# Patient Record
Sex: FEMALE | Race: WHITE | NOT HISPANIC OR LATINO | Employment: OTHER | URBAN - METROPOLITAN AREA
[De-identification: names, ages, dates, MRNs, and addresses within clinical notes are randomized per-mention and may not be internally consistent; named-entity substitution may affect disease eponyms.]

---

## 2012-12-07 LAB — HCV AB SER-ACNC: NEGATIVE

## 2017-02-13 ENCOUNTER — HOSPITAL ENCOUNTER (OUTPATIENT)
Dept: RADIOLOGY | Age: 67
Discharge: HOME | End: 2017-02-13
Payer: MEDICARE

## 2017-02-13 DIAGNOSIS — M85.80 OTHER SPECIFIED DISORDERS OF BONE DENSITY AND STRUCTURE, UNSPECIFIED SITE: ICD-10-CM

## 2017-02-13 DIAGNOSIS — M89.9 DISORDER OF BONE: ICD-10-CM

## 2017-02-13 PROCEDURE — 77080 DXA BONE DENSITY AXIAL: CPT

## 2017-05-22 ENCOUNTER — GENERIC CONVERSION - ENCOUNTER (OUTPATIENT)
Dept: OTHER | Facility: OTHER | Age: 67
End: 2017-05-22

## 2017-05-31 ENCOUNTER — GENERIC CONVERSION - ENCOUNTER (OUTPATIENT)
Dept: OTHER | Facility: OTHER | Age: 67
End: 2017-05-31

## 2017-07-06 ENCOUNTER — GENERIC CONVERSION - ENCOUNTER (OUTPATIENT)
Dept: OTHER | Facility: OTHER | Age: 67
End: 2017-07-06

## 2017-10-24 ENCOUNTER — GENERIC CONVERSION - ENCOUNTER (OUTPATIENT)
Dept: OTHER | Facility: OTHER | Age: 67
End: 2017-10-24

## 2017-11-27 ENCOUNTER — ALLSCRIPTS OFFICE VISIT (OUTPATIENT)
Dept: OTHER | Facility: OTHER | Age: 67
End: 2017-11-27

## 2017-11-27 LAB — HBA1C MFR BLD HPLC: 5.7 %

## 2017-11-28 NOTE — PROGRESS NOTES
Assessment    1  Esophageal reflux (530 81) (K21 9)   2  History of dysphagia (V12 79) (Z87 19)   3  Dysphagia (787 20) (R13 10)   4  Difficulty hearing (389 9) (H91 90)    1  Health maintenance-given influenza vaccine today 2  Health maintenance-has a prescription for herpes zoster and Adacel vaccine 3  Difficulty hearing with impacted cerumen-unable to flush today  Patient will use Debrox ear drops return for repeat treatment in 2 weeks 4  Hyperlipidemia with an LDL of 161  Father had stroke in his 46s  Told her she needs to be more aggressive with lower number-she wants to try conservative therapy and recheck in several months 5  Pre diabetes-hemoglobin A1c 5 7 brother had diabetes but he was an alcoholic  Counseled on appropriate diet  For recheck in the future 6  Intermittent reflux with occasional solid food dysphagia-we talked in the past about potential Schatzki's ring and she was referred for an endoscopy but never went  I again recommended an endoscopy but she deferred realizing the risks she is taking  She states if unimproved in a few months she will then consider proceeding 7-Previous admission to the hospital for GI bleed probably diverticular in origin  Has significant diverticular disease and colonoscopy #8  polyps of the mid transverse colon that followup colonoscopy recently done in July 2014 with repeat recommended in 5 years  This was DEXA scan done in February 2017 shows L-spine -1 2 and hip of-1 9-repeat will be needed in 2-3 years which would be March of 2020 19 March 2020 done by Dr Walls Cea  #9 osteopenia-bone density done previously showed left hip of -1 4  Syble Cruz At one point she was on oral bisphosphonate for one year via the GYN  #10 history of solid food dysphasia-one point was referred for an EGD to rule out Schatzki's ring but she never went #11 family history breast carcinoma-mother and paternal aunt  Benign exam today   Mammogram done October 2015 benign #12 hyperlipidemia-continue conservative therapy 13 family history diabetes he says that the above are normal #14 atypical chest pain-asymptomatic-monitor  Returning for flushing of ears in 2 weeks  Will then need follow-up appointment in 6 months with prior lipids A1c, chemistry profile and CBC   Plan  Hyperlipidemia    · Hemoglobin A1c- POC; Status:Complete;   Done: [id number] 08:51AM  Hyperlipidemia, Prediabetes    · (1) CHOLESTEROL, TOTAL; Status:Active; Requested for:01May2018;    · (1) COMPREHENSIVE METABOLIC PANEL; Status:Active; Requested for:01May2018;    · (1) HDL,DIRECT; Status:Active; Requested for:01May2018;    · (1) HEMOGLOBIN A1C; Status:Active; Requested for:01May2018;    · (1) LDL,DIRECT; Status:Active; Requested for:01May2018;    · (1) TRIGLYCERIDE; Status:Active; Requested for:01May2018;   Need for influenza vaccination    · Fluzone High-Dose 0 5 ML Intramuscular Suspension Prefilled Syringe  Wax in ear    · Removal Impacted Cerumen Using Irrigation / Lavage one or both ears - POC; Status:Complete;  Done: [id number]  Patient to use Debrox and return in 2 weeks for repeat evaluation  Endoscopy recommended but patient defers  Influenza vaccine administered  May need to consider therapy with a statin   History of Present Illness  HPI: She was here for her Medicare well visit with several issues noted  She notes decreased hearing was found to have bilateral impacted cerumen  Attempt was made to flush but there was great difficulty  She will be returning in 2 weeks for repeat treatment after use with Debrox  laboratory testing shows a sugar 117  Hemoglobin A1c done in the office today 5 7  Cholesterol 254 with an HDL of 69  vitamin-D level of 40  Hemoglobin borderline at 50 0 8 with normal platelet count and white count  has hyperlipidemia  She is not a smoker and does not have hypertension or diabetes  Father had a stroke in his 46s  I explained to the patient we want to treat this more aggressively   She wants to follow diet see how she does  She may be candidate for statin is aware this  about esophageal reflux  She has dyspepsia several times per week  He has occasional solid food dysphagia  She does not have significant caffeine or alcohol intake  Because of duration of symptoms I recommended endoscopy but she deferred  She wants to follow conservative measures more aggressively  She realizes the risks she is taking      Review of Systems  Complete-Female:  Constitutional: No fever, no chills, feels well, no tiredness, no recent weight gain or weight loss  Eyes: No complaints of eye pain, no red eyes, no eyesight problems, no discharge, no dry eyes, no itching of eyes  ENT: hearing loss, but-- no earache,-- no nosebleeds,-- no sore throat-- and-- no nasal discharge  Cardiovascular: No complaints of slow heart rate, no fast heart rate, no chest pain, no palpitations, no leg claudication, no lower extremity edema  Respiratory: No complaints of shortness of breath, no wheezing, no cough, no SOB on exertion, no orthopnea, no PND  Gastrointestinal: Solid food dysphagia and pyrosis, but-- no nausea,-- no vomiting,-- no diarrhea-- and-- no blood in stools  Genitourinary: No complaints of dysuria, no incontinence, no pelvic pain, no dysmenorrhea, no vaginal discharge or bleeding  Musculoskeletal: No complaints of arthralgias, no myalgias, no joint swelling or stiffness, no limb pain or swelling  Integumentary: No complaints of skin rash or lesions, no itching, no skin wounds, no breast pain or lump  Neurological: No complaints of headache, no confusion, no convulsions, no numbness, no dizziness or fainting, no tingling, no limb weakness, no difficulty walking  Psychiatric: Not suicidal, no sleep disturbance, no anxiety or depression, no change in personality, no emotional problems  Endocrine: No complaints of proptosis, no hot flashes, no muscle weakness, no deepening of the voice, no feelings of weakness  Hematologic/Lymphatic: No complaints of swollen glands, no swollen glands in the neck, does not bleed easily, does not bruise easily  Active Problems  1  Abnormal movement in bone (733 90) (M89 9)   2  Atypical chest pain (786 59) (R07 89)   3  Diverticulosis (562 10) (K57 90)   4  Esophageal reflux (530 81) (K21 9)   5  Gastrointestinal bleeding (578 9) (K92 2)   6  Hyperlipidemia (272 4) (E78 5)   7  Need for influenza vaccination (V04 81) (Z23)   8  Osteopenia (733 90) (M85 80)   9  Postmenopausal disorder (627 9) (N95 9)   10  Prediabetes (790 29) (R73 03)   11  Vitamin D deficiency (268 9) (E55 9)   12  Wax in ear (380 4) (H61 20)    Past Medical History  1  History of Cervical Cancer (V10 41)   2  History of dysphagia (V12 79) (Z87 19)   3  History of hyperlipidemia (V12 29) (Z86 39)   4  History of Previous Pregnancies Resulted In 2  Living Children   5  History of Previously Pregnant With 2  Normal Vaginal Deliveries   6  History of Summary Of Previous Pregnancies  2  (Total No )  Active Problems And Past Medical History Reviewed: The active problems and past medical history were reviewed and updated today  Surgical History  1  History of Biopsy Breast Open   2  History of Colonoscopy (Fiberoptic)   3  History of Hysterectomy  Surgical History Reviewed: The surgical history was reviewed and updated today  Family History  Mother    1  Family history of Breast Cancer (V16 3)  Maternal Grandmother    2  Family history of Ovarian Cancer (V16 41)  Family History    3  Family history of Breast Cancer (V16 3)   4  Family history of Thyroid Disorder (V18 19)    Social History     · Being A Social Drinker   · Denied: History of Caffeine Use   · Currently sexually active   · Denied: History of Drug Use   · Denied: History of Exercising Regularly   · Never A Smoker  Social History Reviewed: The social history was reviewed and updated today  The social history was reviewed and is unchanged  Current Meds   1  Calcium TABS; Therapy: (Recorded:09Apr2014) to Recorded   2  Probiotic CAPS; Therapy: (Recorded:09Apr2014) to Recorded   3  Vitamin D 1000 UNIT Oral Tablet; TAKE 3 TABLETS DAILY; Therapy: [id number] to Recorded  Medication List Reviewed: The medication list was reviewed and updated today  Allergies  1  Codeine Sulfate TABS    Vitals  Vital Signs    Recorded: [id number] 07:43PM Recorded: [id number] 08:31AM   Heart Rate 68    Respiration 14    Systolic 752, RLE, Sitting    Diastolic 76, RLE, Sitting    Height  5 ft 5 in   Weight  163 lb 8 oz   BMI Calculated  27 21   BSA Calculated  1 82       Physical Exam   Constitutional  General appearance: No acute distress, well appearing and well nourished  Head and Face  Head and face: Normal    Palpation of the face and sinuses: No sinus tenderness  Eyes  Conjunctiva and lids: No swelling, erythema or discharge  Pupils and irises: Equal, round, reactive to light  Ophthalmoscopic examination: Normal fundi and optic discs  Ears, Nose, Mouth, and Throat  External inspection of ears and nose: Normal    Otoscopic examination: Abnormal  -- Bilateral impacted cerumen  Hearing: Normal    Nasal mucosa, septum, and turbinates: Normal without edema or erythema  Lips, teeth, and gums: Normal, good dentition  Oropharynx: Normal with no erythema, edema, exudate or lesions  Neck  Neck: Supple, symmetric, trachea midline, no masses  Thyroid: Normal, no thyromegaly  Pulmonary  Respiratory effort: No increased work of breathing or signs of respiratory distress  Percussion of chest: Normal    Palpation of chest: Normal    Auscultation of lungs: Clear to auscultation  Cardiovascular  Palpation of heart: Normal PMI, no thrills  Auscultation of heart: Normal rate and rhythm, normal S1 and S2, no murmurs  Carotid pulses: 2+ bilaterally  Abdominal aorta: Normal    Femoral pulses: 2+ bilaterally  Pedal pulses: 2+ bilaterally     Examination of extremities for edema and/or varicosities: Normal    Chest  Breasts: Normal, no dimpling or skin changes appreciated  Palpation of breasts and axillae: Normal, no masses palpated  Abdomen  Abdomen: Non-tender, no masses  Liver and spleen: No hepatomegaly or splenomegaly  Examination for hernias: No hernia appreciated  Anus, perineum, and rectum: Normal sphincter tone, no masses, no prolapse  Lymphatic  Palpation of lymph nodes in neck: No lymphadenopathy  Palpation of lymph nodes in axillae: No lymphadenopathy  Palpation of lymph nodes in groin: No lymphadenopathy  Palpation of lymph nodes in other areas: No lymphadenopathy  Musculoskeletal  Gait and station: Normal    Digits and nails: Normal without clubbing or cyanosis  Joints, bones, and muscles: Abnormal  -- Mild crepitance on range of motion of the knees  Range of motion: Normal    Stability: Normal    Muscle strength/tone: Normal    Skin  Skin and subcutaneous tissue: Normal without rashes or lesions  Examination of the skin for lesions: Abnormal  -- Benign nevi  Palpation of skin and subcutaneous tissue: Normal turgor  Neurologic  Cranial nerves: Cranial nerves II-XII intact  Reflexes: 2+ and symmetric  Sensation: No sensory loss  Psychiatric  Judgment and insight: Normal    Orientation to person, place, and time: Normal    Recent and remote memory: Intact  Mood and affect: Normal        Results/Data  PHQ-2 Adult Depression Screening 27Nov2017 09:25AM User, s     Test Name Result Flag Reference   PHQ-2 Adult Depression Score 0       Over the last two weeks, how often have you been bothered by any of the following problems?  Little interest or pleasure in doing things: Not at all - 0 Feeling down, depressed, or hopeless: Not at all - 0   PHQ-2 Adult Depression Screening Negative       Hemoglobin A1c- POC [id number] 08:51AM Guillermo Correa     Test Name Result Flag Reference   HEMOGLOBIN A1C 5 7 Procedure           Indication: cerumen impaction in both ears  Procedure Note: The procedure was performed by the Provider  A otoscope was placed in the ear canal(s) to visualize the ear canal debris  The ear was cleaned by using warm water irrigation-- and-- a curette  Post-Procedure:  Patient Status: the patient tolerated the procedure well  Complications: there were no complications  Follow-up in 2 weeks for additional treatment week(s)  Future Appointments    Date/Time Provider Specialty Site   05/29/2018 08:30 AM RUSS Walker   Internal Medicine Mary Anne Cormier MD       Signatures   Electronically signed by : RUSS Ortiz ; Nov 27 2017  8:03PM EST                       (Author)

## 2017-12-15 ENCOUNTER — GENERIC CONVERSION - ENCOUNTER (OUTPATIENT)
Dept: OTHER | Facility: OTHER | Age: 67
End: 2017-12-15

## 2018-01-13 VITALS
HEIGHT: 65 IN | BODY MASS INDEX: 27.24 KG/M2 | SYSTOLIC BLOOD PRESSURE: 128 MMHG | WEIGHT: 163.5 LBS | DIASTOLIC BLOOD PRESSURE: 76 MMHG | HEART RATE: 68 BPM | RESPIRATION RATE: 14 BRPM

## 2018-01-23 NOTE — PROGRESS NOTES
Assessment  1  General care-Medicare well visit completed  2  Health maintenance-given influenza vaccine today  3  Esophageal reflux with solid food dysphagia-see separate dictation of this date     Plan  Hyperlipidemia    · Hemoglobin A1c- POC; Status:Complete;   Done: [id number] 08:51AM  Hyperlipidemia, Prediabetes    · (1) CHOLESTEROL, TOTAL; Status:Active; Requested for:01May2018;    · (1) COMPREHENSIVE METABOLIC PANEL; Status:Active; Requested for:01May2018;    · (1) HDL,DIRECT; Status:Active; Requested for:01May2018;    · (1) HEMOGLOBIN A1C; Status:Active; Requested for:01May2018;    · (1) LDL,DIRECT; Status:Active; Requested for:01May2018;    · (1) TRIGLYCERIDE; Status:Active; Requested for:01May2018;   Need for influenza vaccination    · Fluzone High-Dose 0 5 ML Intramuscular Suspension Prefilled Syringe  Wax in ear    · Removal Impacted Cerumen Using Irrigation / Lavage one or both ears - POC;  Status:Complete;   Done: [id number]    History of Present Illness  Welcome to Medicare and Wellness Visits: The patient is being seen for the initial annual wellness visit  Medicare Screening and Risk Factors   Hospitalizations: no previous hospitalizations and No recent hospitalizations  Once per lifetime medicare screening tests: ECG and Most recent EKG shows sinus rhythm without ischemia  Medicare Screening Tests Risk Questions   Abdominal aortic aneurysm risk assessment: Prior radiographic study negative for AAA  Osteoporosis risk assessment: , female gender, over 48years of age and Has periodic bone  HIV risk assessment: none indicated  Drug and Alcohol Use: The patient has never smoked cigarettes  The patient reports occasional alcohol use and drinking 1 drinks per month  Alcohol concern:   The patient has no concerns about alcohol abuse  She has never used illicit drugs     Diet and Physical Activity: Current diet includes well balanced meals, 1 servings of fruit per day, 1 servings of vegetables per day, 1 servings of meat per day, 1 servings of whole grains per day, 1 servings of dairy products per day, 0 cups of coffee per day, 0 cups of tea per day, 0 cans of regular soda per day, 0 cans of diet soda per day and 4 glasses of water per day  The patient does not exercise  Mood Disorder and Cognitive Impairment Screening: Anxiety screening was done using jean 7  Depression screening was done using who wellbeing index score of 23  She denies feeling down, depressed, or hopeless over the past two weeks  She denies feeling little interest or pleasure in doing things over the past two weeks  Cognitive impairment screening: denies difficulty learning/retaining new information, denies difficulty handling complex tasks, denies difficulty with reasoning, denies difficulty with spatial ability and orientation, denies difficulty with language and denies difficulty with behavior  Functional Ability/Level of Safety: She reports hearing difficulties  She does not use a hearing aid  The patient is currently able to do activities of daily living without limitations, able to do instrumental activities of daily living without limitations, able to participate in social activities without limitations and able to drive without limitations  Activities of daily living details: does not need help using the phone, no transportation help needed, does not need help shopping, no meal preparation help needed, does not need help doing housework, does not need help doing laundry, does not need help managing medications and does not need help managing money  Injury History: no polypharmacy, no alcohol use, no mobility impairment, no antidepressant use, no deconditioning, no postural hypotension, no sedative use, no visual impairment, no urinary incontinence, no antihypertensive use, no cognitive impairment, up and go test was normal and previous fall     Home safety risk factors:  no handrails on the stairs, but no unfamiliar surroundings, no loose rugs, no poor household lighting, no uneven floors, no household clutter and grab bars in the bathroom  Advance Directives: Advance directives: no living will, no durable power of  for health care directives and no advance directives  end of life decisions were reviewed with the patient and I agree with the patient's decisions  Co-Managers and Medical Equipment/Suppliers: See Patient Care Team   Additional Information: Given influenza vaccine today  Has had both types of pneumococcal vaccine  Herpes zoster vaccine recommended  Subsequent colonoscopy due in year 2019  Had mammogram in the year 2017  Had bone density study in the year 2017  Review of Systems    Constitutional: negative  Eyes: negative  ENT: negative  Cardiovascular: negative  Respiratory: negative  Gastrointestinal: Intermittent solid food dysphagia as well as pyrosis, but no abdominal pain, no abdominal bloating, no abdominal cramps, no nausea, no vomiting, no diarrhea, able to pass flatus, no constipation, no bright red blood per rectum and no melena  Genitourinary: negative  Musculoskeletal: negative  Integumentary and Breasts: negative  Neurological: negative  Psychiatric: negative  Endocrine: negative  Hematologic and Lymphatic: negative  Active Problems    1  Abnormal movement in bone (733 90) (M89 9)   2  Atypical chest pain (786 59) (R07 89)   3  Diverticulosis (562 10) (K57 90)   4  Dysphagia (787 20) (R13 10)   5  Esophageal reflux (530 81) (K21 9)   6  Gastrointestinal bleeding (578 9) (K92 2)   7  Hyperlipidemia (272 4) (E78 5)   8  Osteopenia (733 90) (M85 80)   9  Postmenopausal disorder (627 9) (N95 9)   10   Vitamin D deficiency (268 9) (E55 9)    Past Medical History    · History of Cervical Cancer (V10 41)   · History of hyperlipidemia (V12 29) (Z86 39)   · History of Previous Pregnancies Resulted In 2  Living Children   · History of Previously Pregnant With 2  Normal Vaginal Deliveries   · History of Summary Of Previous Pregnancies  2  (Total No )    The active problems and past medical history were reviewed and updated today  Surgical History    · History of Biopsy Breast Open   · History of Colonoscopy (Fiberoptic)   · History of Hysterectomy    The surgical history was reviewed and updated today  Family History  Mother    · Family history of Breast Cancer (V16 3)  Maternal Grandmother    · Family history of Ovarian Cancer (V16 41)  Family History    · Family history of Breast Cancer (V16 3)   · Family history of Thyroid Disorder (V18 19)    The family history was reviewed and updated today  Social History    · Being A Social Drinker   · Denied: History of Caffeine Use   · Currently sexually active   · Denied: History of Drug Use   · Denied: History of Exercising Regularly   · Never A Smoker  The social history was reviewed and updated today  The social history was reviewed and is unchanged  Current Meds   1  Calcium TABS; Therapy: (Recorded:2014) to Recorded   2  Probiotic CAPS; Therapy: (Recorded:2014) to Recorded   3  Vitamin D 1000 UNIT Oral Tablet; TAKE 3 TABLETS DAILY; Therapy: [id number] to Recorded    The medication list was reviewed and updated today  Allergies    1  Codeine Sulfate TABS    Immunizations   1 2 3    Influenza  2012    PPSV  2014      Vitals  Signs    Heart Rate: 68  Respiration: 14  Systolic: 878, RLE, Sitting  Diastolic: 76, RLE, Sitting   Height: 5 ft 5 in  Weight: 163 lb 8 oz  BMI Calculated: 27 21  BSA Calculated: 1 82    Physical Exam    Constitutional   General appearance: No acute distress, well appearing and well nourished  Head and Face   Head and face: Normal     Palpation of the face and sinuses: No sinus tenderness  Eyes   Conjunctiva and lids: No swelling, erythema or discharge      Pupils and irises: Equal, round, reactive to light  Ophthalmoscopic examination: Normal fundi and optic discs  Ears, Nose, Mouth, and Throat   External inspection of ears and nose: Normal     Otoscopic examination: Tympanic membranes translucent with normal light reflex  Canals patent without erythema  Hearing: Normal     Nasal mucosa, septum, and turbinates: Normal without edema or erythema  Lips, teeth, and gums: Normal, good dentition  Oropharynx: Normal with no erythema, edema, exudate or lesions  Neck   Neck: Supple, symmetric, trachea midline, no masses  Thyroid: Normal, no thyromegaly  Pulmonary   Respiratory effort: No increased work of breathing or signs of respiratory distress  Percussion of chest: Normal     Palpation of chest: Normal     Auscultation of lungs: Clear to auscultation  Cardiovascular   Palpation of heart: Normal PMI, no thrills  Auscultation of heart: Normal rate and rhythm, normal S1 and S2, no murmurs  Carotid pulses: 2+ bilaterally  Abdominal aorta: Normal     Femoral pulses: 2+ bilaterally  Pedal pulses: 2+ bilaterally  Examination of extremities for edema and/or varicosities: Normal     Chest   Breasts: Normal, no dimpling or skin changes appreciated  Palpation of breasts and axillae: Normal, no masses palpated  Abdomen   Abdomen: Non-tender, no masses  Liver and spleen: No hepatomegaly or splenomegaly  Examination for hernias: No hernia appreciated  Anus, perineum, and rectum: Normal sphincter tone, no masses, no prolapse  Lymphatic   Palpation of lymph nodes in neck: No lymphadenopathy  Palpation of lymph nodes in axillae: No lymphadenopathy  Palpation of lymph nodes in groin: No lymphadenopathy  Palpation of lymph nodes in other areas: No lymphadenopathy  Musculoskeletal   Gait and station: Normal     Digits and nails: Normal without clubbing or cyanosis      Joints, bones, and muscles: Abnormal   Mild crepitance on range of motion of the knees  Range of motion: Normal     Stability: Normal     Muscle strength/tone: Normal     Skin   Skin and subcutaneous tissue: Normal without rashes or lesions  Examination of the skin for lesions: Abnormal   Benign nevi  Palpation of skin and subcutaneous tissue: Normal turgor  Neurologic   Cranial nerves: Cranial nerves II-XII intact  Reflexes: 2+ and symmetric  Sensation: No sensory loss  Psychiatric   Judgment and insight: Normal     Orientation to person, place, and time: Normal     Recent and remote memory: Intact  Mood and affect: Normal        Results/Data  Hemoglobin A1c- POC [id number] 08:51AM Matthew Elias     Test Name Result Flag Reference   HEMOGLOBIN A1C 5 7         Future Appointments    Date/Time Provider Specialty Site   05/29/2018 08:30 AM RUSS Cowan   Internal Medicine Cordelia Victoria MD     Signatures   Electronically signed by : RUSS Spencer ; Nov 27 2017  7:48PM EST                       (Author)

## 2018-01-24 VITALS — HEIGHT: 65 IN | BODY MASS INDEX: 27.39 KG/M2 | WEIGHT: 164.38 LBS

## 2018-05-01 DIAGNOSIS — R73.03 PREDIABETES: ICD-10-CM

## 2018-05-01 DIAGNOSIS — E78.5 HYPERLIPIDEMIA: ICD-10-CM

## 2019-05-21 ENCOUNTER — OFFICE VISIT (OUTPATIENT)
Dept: OBGYN CLINIC | Facility: CLINIC | Age: 69
End: 2019-05-21
Payer: MEDICARE

## 2019-05-21 VITALS
BODY MASS INDEX: 25.71 KG/M2 | DIASTOLIC BLOOD PRESSURE: 72 MMHG | WEIGHT: 160 LBS | HEIGHT: 66 IN | SYSTOLIC BLOOD PRESSURE: 120 MMHG

## 2019-05-21 DIAGNOSIS — Z01.419 ENCOUNTER FOR GYNECOLOGICAL EXAMINATION WITHOUT ABNORMAL FINDING: ICD-10-CM

## 2019-05-21 DIAGNOSIS — Z12.39 SCREENING FOR MALIGNANT NEOPLASM OF BREAST: ICD-10-CM

## 2019-05-21 DIAGNOSIS — C53.9 MALIGNANT NEOPLASM OF CERVIX, UNSPECIFIED SITE (HCC): ICD-10-CM

## 2019-05-21 DIAGNOSIS — N95.1 POSTMENOPAUSAL DISORDER: Primary | ICD-10-CM

## 2019-05-21 PROCEDURE — G0101 CA SCREEN;PELVIC/BREAST EXAM: HCPCS | Performed by: PHYSICIAN ASSISTANT

## 2019-09-10 ENCOUNTER — TELEPHONE (OUTPATIENT)
Dept: INTERNAL MEDICINE CLINIC | Facility: CLINIC | Age: 69
End: 2019-09-10

## 2019-09-10 DIAGNOSIS — E55.9 VITAMIN D DEFICIENCY: ICD-10-CM

## 2019-09-10 DIAGNOSIS — M85.89 OSTEOPENIA OF MULTIPLE SITES: ICD-10-CM

## 2019-09-10 DIAGNOSIS — E78.01 FAMILIAL HYPERCHOLESTEROLEMIA: Primary | ICD-10-CM

## 2019-09-10 DIAGNOSIS — I10 HYPERTENSION, UNSPECIFIED TYPE: ICD-10-CM

## 2019-09-10 NOTE — TELEPHONE ENCOUNTER
She will need CBC with diff, chemistry profile, cholesterol, HDL, LDL, triglycerides, vitamin-D level,   Sed rate, -8 hour fast with diagnosis of hyperlipidemia osteopenia     Also this patient should be seen by dr Lashawn Salgado-  Please explain the office situation to her and switch to Dr Lashawn Salgado  As her primary care physician

## 2019-09-10 NOTE — TELEPHONE ENCOUNTER
Patient called and rescheduled from her may appt , she is coming in October 8th   Please give me labs

## 2019-10-08 ENCOUNTER — OFFICE VISIT (OUTPATIENT)
Dept: INTERNAL MEDICINE CLINIC | Facility: CLINIC | Age: 69
End: 2019-10-08
Payer: MEDICARE

## 2019-10-08 VITALS
DIASTOLIC BLOOD PRESSURE: 82 MMHG | BODY MASS INDEX: 26.23 KG/M2 | RESPIRATION RATE: 15 BRPM | HEART RATE: 90 BPM | SYSTOLIC BLOOD PRESSURE: 126 MMHG | OXYGEN SATURATION: 97 % | HEIGHT: 66 IN | WEIGHT: 163.2 LBS

## 2019-10-08 DIAGNOSIS — Z23 INFLUENZA VACCINE NEEDED: ICD-10-CM

## 2019-10-08 DIAGNOSIS — E78.2 MIXED HYPERLIPIDEMIA: Primary | ICD-10-CM

## 2019-10-08 DIAGNOSIS — Z12.11 SCREENING FOR COLON CANCER: ICD-10-CM

## 2019-10-08 DIAGNOSIS — H61.23 BILATERAL HEARING LOSS DUE TO CERUMEN IMPACTION: ICD-10-CM

## 2019-10-08 DIAGNOSIS — Z13.1 SCREENING FOR DIABETES MELLITUS: ICD-10-CM

## 2019-10-08 DIAGNOSIS — Z13.29 SCREENING FOR THYROID DISORDER: ICD-10-CM

## 2019-10-08 DIAGNOSIS — E55.9 VITAMIN D DEFICIENCY: ICD-10-CM

## 2019-10-08 DIAGNOSIS — R79.9 ABNORMAL FINDING OF BLOOD CHEMISTRY, UNSPECIFIED: ICD-10-CM

## 2019-10-08 PROCEDURE — 90662 IIV NO PRSV INCREASED AG IM: CPT

## 2019-10-08 PROCEDURE — G0439 PPPS, SUBSEQ VISIT: HCPCS | Performed by: INTERNAL MEDICINE

## 2019-10-08 PROCEDURE — G0008 ADMIN INFLUENZA VIRUS VAC: HCPCS

## 2019-10-08 RX ORDER — ATORVASTATIN CALCIUM 10 MG/1
10 TABLET, FILM COATED ORAL DAILY
Qty: 90 TABLET | Refills: 3 | Status: SHIPPED | OUTPATIENT
Start: 2019-10-08 | End: 2020-10-05 | Stop reason: SDUPTHER

## 2019-10-08 NOTE — PROGRESS NOTES
Assessment/Plan:    #GERD  -acid reflux with occasional dysphagia  -was referred for EGD in the past but defers for now  -currently on warm water and baking soda mix that helps  -takes tums intermittently    #HLD  - HDL 60  -start on low dose atorvastatin  -reports she eats healthy and exercises by walking often    #Hearing Loss with Cerumen Impaction  -cerumen impaction b/l and attempted to irrigate at bedside wtihout success  -refer to ENT    #Osteopenia  -DEXA 2017 with hip -1 9  -repeat in March 2020  -at one time had been on bisphosphonate therapy per gyn but stopped after a year    #Prediabetes  -a1c previously 5 7  -will recheck a1c with next set of labs     #Obesity  BMI Counseling: Body mass index is 26 34 kg/m²  Discussed the patient's BMI with her  The BMI is above normal  Nutrition recommendations include decreasing overall calorie intake  Exercise recommendations include vigorous aerobic physical activity for 75 minutes/week  #Health Maintenance  -return to care 6 months with labs  -flu vaccine up to date 2019  -shingles vaccine deferred due to cost  -patient to obtain TDAP from pharmacy  -mammogram due now  -colonoscopy due now  -DEXA 2017 and repeat March 2020    Addendum 06/18/2020 , HDL 61, vitamin-D 54,  TSH 6 76, A1c 5 8, follow-up 06/23/2020  No problem-specific Assessment & Plan notes found for this encounter  Diagnoses and all orders for this visit:    Mixed hyperlipidemia  -     atorvastatin (LIPITOR) 10 mg tablet; Take 1 tablet (10 mg total) by mouth daily  -     CBC and differential; Future  -     Lipid Panel with Direct LDL reflex; Future  -     Hemoglobin A1C; Future  -     TSH, 3rd generation with Free T4 reflex; Future  -     Comprehensive metabolic panel; Future  -     Vitamin D 25 hydroxy; Future    Bilateral hearing loss due to cerumen impaction  -     CBC and differential; Future  -     Lipid Panel with Direct LDL reflex;  Future  -     Hemoglobin A1C; Future  -     TSH, 3rd generation with Free T4 reflex; Future  -     Comprehensive metabolic panel; Future  -     Vitamin D 25 hydroxy; Future  -     Ambulatory Referral to Otolaryngology; Future    Screening for thyroid disorder  -     CBC and differential; Future  -     Lipid Panel with Direct LDL reflex; Future  -     Hemoglobin A1C; Future  -     TSH, 3rd generation with Free T4 reflex; Future  -     Comprehensive metabolic panel; Future  -     Vitamin D 25 hydroxy; Future    Vitamin D deficiency  -     CBC and differential; Future  -     Lipid Panel with Direct LDL reflex; Future  -     Hemoglobin A1C; Future  -     TSH, 3rd generation with Free T4 reflex; Future  -     Comprehensive metabolic panel; Future  -     Vitamin D 25 hydroxy; Future    Screening for colon cancer  -     Ambulatory referral to Colorectal Surgery; Future  -     CBC and differential; Future  -     Lipid Panel with Direct LDL reflex; Future  -     Hemoglobin A1C; Future  -     TSH, 3rd generation with Free T4 reflex; Future  -     Comprehensive metabolic panel; Future  -     Vitamin D 25 hydroxy; Future    Screening for diabetes mellitus  -     CBC and differential; Future  -     Lipid Panel with Direct LDL reflex; Future  -     Hemoglobin A1C; Future  -     TSH, 3rd generation with Free T4 reflex; Future  -     Comprehensive metabolic panel; Future  -     Vitamin D 25 hydroxy; Future    Influenza vaccine needed  -     influenza vaccine, 2025-7811, high-dose, PF 0 5 mL (FLUZONE HIGH-DOSE)  -     CBC and differential; Future  -     Lipid Panel with Direct LDL reflex; Future  -     Hemoglobin A1C; Future  -     TSH, 3rd generation with Free T4 reflex; Future  -     Comprehensive metabolic panel; Future  -     Vitamin D 25 hydroxy;  Future    Abnormal finding of blood chemistry, unspecified   -     Hemoglobin A1C; Future            Current Outpatient Medications:     Calcium Carb-Cholecalciferol (CALCIUM 1000 + D) 1000-800 MG-UNIT TABS, Take by mouth, Disp: , Rfl:     cholecalciferol (VITAMIN D3) 1,000 units tablet, Take 3 tablets by mouth daily, Disp: , Rfl:     Probiotic Product (PROBIOTIC-10) CAPS, Take by mouth, Disp: , Rfl:     atorvastatin (LIPITOR) 10 mg tablet, Take 1 tablet (10 mg total) by mouth daily, Disp: 90 tablet, Rfl: 3    Subjective:      Patient ID: Nelson Najera is a 77 y o  female  HPI     Patient presents for routine visit  Reports that last year she missed her annual exam   She states that she has no issues at this time except for acid reflux for which she takes baking soda and warm water along with Tums  She is reluctant to start any new medications and states that she would like to try natural remedies  She has a history of cerumen impaction and we will disimpact her ears at the bedside today without success  She will need to see ENT for further irrigation  Her LDL was elevated at 161 and she has a family history of hyperlipidemia for which we will need to start her on a statin  We will start her on 10 mg atorvastatin informed her to notify us if she develops any myalgias or weakness in the legs  She is due for repeat labs and we will repeat her labs in 6 months  She has due for repeat colonoscopy in her most recent was 2014  She is also due for mammogram and DEXA scan which she will obtain later this month  She will receive her flu vaccine today  She states that she is deferring on the shingles vaccine due to cost   She will obtain the TDAP from her pharmacy  She    The following portions of the patient's history were reviewed and updated as appropriate: allergies, current medications, past family history, past medical history, past social history, past surgical history and problem list     Review of Systems   Constitutional: Negative for activity change, appetite change, chills, fatigue and fever  HENT: Positive for hearing loss   Negative for congestion, ear pain, facial swelling, sore throat, tinnitus and trouble swallowing  Eyes: Negative for photophobia and visual disturbance  Respiratory: Negative for cough, shortness of breath and wheezing  Cardiovascular: Negative for chest pain and leg swelling  Gastrointestinal: Negative for abdominal distention, abdominal pain, blood in stool, nausea and vomiting  Genitourinary: Negative for difficulty urinating, dysuria and pelvic pain  Musculoskeletal: Negative for arthralgias, back pain, gait problem, joint swelling, myalgias, neck pain and neck stiffness  Skin: Negative for rash and wound  Neurological: Negative for dizziness, tremors, light-headedness and headaches  Objective:      /82 (BP Location: Left arm, Patient Position: Sitting, Cuff Size: Adult)   Pulse 90   Resp 15   Ht 5' 6" (1 676 m)   Wt 74 kg (163 lb 3 2 oz)   SpO2 97%   BMI 26 34 kg/m²          Physical Exam   Constitutional: She is oriented to person, place, and time  She appears well-developed and well-nourished  HENT:   Head: Normocephalic and atraumatic  Nose: Nose normal    Mouth/Throat: Oropharynx is clear and moist    Cerumen impacted b/l ears   Eyes: Pupils are equal, round, and reactive to light  Conjunctivae and EOM are normal    Neck: Normal range of motion  Neck supple  No JVD present  No thyromegaly present  Cardiovascular: Normal rate, regular rhythm, normal heart sounds and intact distal pulses  No murmur heard  Pulmonary/Chest: Effort normal and breath sounds normal  No stridor  No respiratory distress  She has no wheezes  Abdominal: Soft  Bowel sounds are normal  She exhibits no distension and no mass  There is no tenderness  There is no rebound and no guarding  Musculoskeletal: Normal range of motion  She exhibits no edema or tenderness  Lymphadenopathy:     She has no cervical adenopathy  Neurological: She is alert and oriented to person, place, and time  She has normal reflexes  Skin: Skin is warm and dry  No rash noted   No erythema  Vitals reviewed

## 2019-10-08 NOTE — PATIENT INSTRUCTIONS
Medicare Preventive Visit Patient Instructions  Thank you for completing your Welcome to Medicare Visit or Medicare Annual Wellness Visit today  Your next wellness visit will be due in one year (10/8/2020)  The screening/preventive services that you may require over the next 5-10 years are detailed below  Some tests may not apply to you based off risk factors and/or age  Screening tests ordered at today's visit but not completed yet may show as past due  Also, please note that scanned in results may not display below  Preventive Screenings:  Service Recommendations Previous Testing/Comments   Colorectal Cancer Screening  * Colonoscopy    * Fecal Occult Blood Test (FOBT)/Fecal Immunochemical Test (FIT)  * Fecal DNA/Cologuard Test  * Flexible Sigmoidoscopy Age: 54-65 years old   Colonoscopy: every 10 years (may be performed more frequently if at higher risk)  OR  FOBT/FIT: every 1 year  OR  Cologuard: every 3 years  OR  Sigmoidoscopy: every 5 years  Screening may be recommended earlier than age 48 if at higher risk for colorectal cancer  Also, an individualized decision between you and your healthcare provider will decide whether screening between the ages of 74-80 would be appropriate  Colonoscopy: 07/02/2014  FOBT/FIT: Not on file  Cologuard: Not on file  Sigmoidoscopy: Not on file    Screening Current     Breast Cancer Screening Age: 36 years old  Frequency: every 1-2 years  Not required if history of left and right mastectomy Mammogram: 10/25/2018    Screening Current   Cervical Cancer Screening Between the ages of 21-29, pap smear recommended once every 3 years  Between the ages of 33-67, can perform pap smear with HPV co-testing every 5 years     Recommendations may differ for women with a history of total hysterectomy, cervical cancer, or abnormal pap smears in past  Pap Smear: 05/21/2019    History Cervical Cancer   Hepatitis C Screening Once for adults born between 1945 and 1965  More frequently in patients at high risk for Hepatitis C Hep C Antibody: Not on file       Diabetes Screening 1-2 times per year if you're at risk for diabetes or have pre-diabetes Fasting glucose: No results in last 5 years   A1C: 5 7       Cholesterol Screening Once every 5 years if you don't have a lipid disorder  May order more often based on risk factors  Lipid panel: Not on file    Screening Not Indicated  History Lipid Disorder     Other Preventive Screenings Covered by Medicare:  1  Abdominal Aortic Aneurysm (AAA) Screening: covered once if your at risk  You're considered to be at risk if you have a family history of AAA  2  Lung Cancer Screening: covers low dose CT scan once per year if you meet all of the following conditions: (1) Age 50-69; (2) No signs or symptoms of lung cancer; (3) Current smoker or have quit smoking within the last 15 years; (4) You have a tobacco smoking history of at least 30 pack years (packs per day multiplied by number of years you smoked); (5) You get a written order from a healthcare provider  3  Glaucoma Screening: covered annually if you're considered high risk: (1) You have diabetes OR (2) Family history of glaucoma OR (3)  aged 48 and older OR (3)  American aged 72 and older  3  Osteoporosis Screening: covered every 2 years if you meet one of the following conditions: (1) You're estrogen deficient and at risk for osteoporosis based off medical history and other findings; (2) Have a vertebral abnormality; (3) On glucocorticoid therapy for more than 3 months; (4) Have primary hyperparathyroidism; (5) On osteoporosis medications and need to assess response to drug therapy  · Last bone density test (DXA Scan): 02/13/2017  5  HIV Screening: covered annually if you're between the age of 12-76  Also covered annually if you are younger than 13 and older than 72 with risk factors for HIV infection   For pregnant patients, it is covered up to 3 times per pregnancy  Immunizations:  Immunization Recommendations   Influenza Vaccine Annual influenza vaccination during flu season is recommended for all persons aged >= 6 months who do not have contraindications   Pneumococcal Vaccine (Prevnar and Pneumovax)  * Prevnar = PCV13  * Pneumovax = PPSV23   Adults 25-60 years old: 1-3 doses may be recommended based on certain risk factors  Adults 72 years old: Prevnar (PCV13) vaccine recommended followed by Pneumovax (PPSV23) vaccine  If already received PPSV23 since turning 65, then PCV13 recommended at least one year after PPSV23 dose  Hepatitis B Vaccine 3 dose series if at intermediate or high risk (ex: diabetes, end stage renal disease, liver disease)   Tetanus (Td) Vaccine - COST NOT COVERED BY MEDICARE PART B Following completion of primary series, a booster dose should be given every 10 years to maintain immunity against tetanus  Td may also be given as tetanus wound prophylaxis  Tdap Vaccine - COST NOT COVERED BY MEDICARE PART B Recommended at least once for all adults  For pregnant patients, recommended with each pregnancy  Shingles Vaccine (Shingrix) - COST NOT COVERED BY MEDICARE PART B  2 shot series recommended in those aged 48 and above     Health Maintenance Due:      Topic Date Due    Hepatitis C Screening  1950    CRC Screening: Colonoscopy  07/02/2019    MAMMOGRAM  10/25/2019     Immunizations Due:      Topic Date Due    DTaP,Tdap,and Td Vaccines (1 - Tdap) 12/06/1969    Pneumococcal Vaccine: 65+ Years (2 of 2 - PCV13) 11/11/2016    INFLUENZA VACCINE  07/01/2019     Advance Directives   What are advance directives? Advance directives are legal documents that state your wishes and plans for medical care  These plans are made ahead of time in case you lose your ability to make decisions for yourself  Advance directives can apply to any medical decision, such as the treatments you want, and if you want to donate organs     What are the types of advance directives? There are many types of advance directives, and each state has rules about how to use them  You may choose a combination of any of the following:  · Living will: This is a written record of the treatment you want  You can also choose which treatments you do not want, which to limit, and which to stop at a certain time  This includes surgery, medicine, IV fluid, and tube feedings  · Durable power of  for healthcare [facility]): This is a written record that states who you want to make healthcare choices for you when you are unable to make them for yourself  This person, called a proxy, is usually a family member or a friend  You may choose more than 1 proxy  · Do not resuscitate (DNR) order:  A DNR order is used in case your heart stops beating or you stop breathing  It is a request not to have certain forms of treatment, such as CPR  A DNR order may be included in other types of advance directives  · Medical directive: This covers the care that you want if you are in a coma, near death, or unable to make decisions for yourself  You can list the treatments you want for each condition  Treatment may include pain medicine, surgery, blood transfusions, dialysis, IV or tube feedings, and a ventilator (breathing machine)  · Values history: This document has questions about your views, beliefs, and how you feel and think about life  This information can help others choose the care that you would choose  Why are advance directives important? An advance directive helps you control your care  Although spoken wishes may be used, it is better to have your wishes written down  Spoken wishes can be misunderstood, or not followed  Treatments may be given even if you do not want them  An advance directive may make it easier for your family to make difficult choices about your care     Weight Management   Why it is important to manage your weight:  Being overweight increases your risk of health conditions such as heart disease, high blood pressure, type 2 diabetes, and certain types of cancer  It can also increase your risk for osteoarthritis, sleep apnea, and other respiratory problems  Aim for a slow, steady weight loss  Even a small amount of weight loss can lower your risk of health problems  How to lose weight safely:  A safe and healthy way to lose weight is to eat fewer calories and get regular exercise  You can lose up about 1 pound a week by decreasing the number of calories you eat by 500 calories each day  Healthy meal plan for weight management:  A healthy meal plan includes a variety of foods, contains fewer calories, and helps you stay healthy  A healthy meal plan includes the following:  · Eat whole-grain foods more often  A healthy meal plan should contain fiber  Fiber is the part of grains, fruits, and vegetables that is not broken down by your body  Whole-grain foods are healthy and provide extra fiber in your diet  Some examples of whole-grain foods are whole-wheat breads and pastas, oatmeal, brown rice, and bulgur  · Eat a variety of vegetables every day  Include dark, leafy greens such as spinach, kale, sandra greens, and mustard greens  Eat yellow and orange vegetables such as carrots, sweet potatoes, and winter squash  · Eat a variety of fruits every day  Choose fresh or canned fruit (canned in its own juice or light syrup) instead of juice  Fruit juice has very little or no fiber  · Eat low-fat dairy foods  Drink fat-free (skim) milk or 1% milk  Eat fat-free yogurt and low-fat cottage cheese  Try low-fat cheeses such as mozzarella and other reduced-fat cheeses  · Choose meat and other protein foods that are low in fat  Choose beans or other legumes such as split peas or lentils  Choose fish, skinless poultry (chicken or turkey), or lean cuts of red meat (beef or pork)  Before you cook meat or poultry, cut off any visible fat  · Use less fat and oil    Try baking foods instead of frying them  Add less fat, such as margarine, sour cream, regular salad dressing and mayonnaise to foods  Eat fewer high-fat foods  Some examples of high-fat foods include french fries, doughnuts, ice cream, and cakes  · Eat fewer sweets  Limit foods and drinks that are high in sugar  This includes candy, cookies, regular soda, and sweetened drinks  Exercise:  Exercise at least 30 minutes per day on most days of the week  Some examples of exercise include walking, biking, dancing, and swimming  You can also fit in more physical activity by taking the stairs instead of the elevator or parking farther away from stores  Ask your healthcare provider about the best exercise plan for you  © Copyright [employer] 2018 Information is for End User's use only and may not be sold, redistributed or otherwise used for commercial purposes   All illustrations and images included in CareNotes® are the copyrighted property of [employer]  or [address] [place] [address]

## 2019-10-08 NOTE — PROGRESS NOTES
Assessment and Plan:     Problem List Items Addressed This Visit     None           Preventive health issues were discussed with patient, and age appropriate screening tests were ordered as noted in patient's After Visit Summary  Personalized health advice and appropriate referrals for health education or preventive services given if needed, as noted in patient's After Visit Summary       History of Present Illness:     Patient presents for Medicare Annual Wellness visit    Patient Care Team:  Moiz Del Valle MD as PCP - General     Problem List:     Patient Active Problem List   Diagnosis   (none) - all problems resolved or deleted      Past Medical and Surgical History:     Past Medical History:   Diagnosis Date    Cervical cancer ([facility] [address] )     last assessed 09Apr2014    Hyperlipemia      Past Surgical History:   Procedure Laterality Date    BREAST BIOPSY Left     COLONOSCOPY      (Fiberoptic)    HYSTERECTOMY        Family History:     Family History   Problem Relation Age of Onset    Breast cancer Mother     Ovarian cancer Maternal Grandmother     Breast cancer Family     Thyroid disease Family     Breast cancer Paternal Aunt     Breast cancer Cousin       Social History:     Social History     Socioeconomic History    Marital status: /Civil Union     Spouse name: None    Number of children: None    Years of education: None    Highest education level: None   Occupational History    None   Social Needs    Financial resource strain: None    Food insecurity:     Worry: None     Inability: None    Transportation needs:     Medical: None     Non-medical: None   Tobacco Use    Smoking status: Never Smoker    Smokeless tobacco: Never Used   Substance and Sexual Activity    Alcohol use: Yes     Frequency: Never     Drinks per session: 1 or 2     Binge frequency: Never     Comment: social    Drug use: No    Sexual activity: Yes     Partners: Male   Lifestyle    Physical activity:     Days per week: None     Minutes per session: None    Stress: None   Relationships    Social connections:     Talks on phone: None     Gets together: None     Attends Mandaeism service: None     Active member of club or organization: None     Attends meetings of clubs or organizations: None     Relationship status: None    Intimate partner violence:     Fear of current or ex partner: None     Emotionally abused: None     Physically abused: None     Forced sexual activity: None   Other Topics Concern    None   Social History Narrative    denied caffeine use    H/o exercising regularly       Medications and Allergies:     Current Outpatient Medications   Medication Sig Dispense Refill    Calcium Carb-Cholecalciferol (CALCIUM 1000 + D) 1000-800 MG-UNIT TABS Take by mouth      cholecalciferol (VITAMIN D3) 1,000 units tablet Take 3 tablets by mouth daily      Probiotic Product (PROBIOTIC-10) CAPS Take by mouth       No current facility-administered medications for this visit        Allergies   Allergen Reactions    Codeine Hives      Immunizations:     Immunization History   Administered Date(s) Administered    Influenza Quadrivalent, 6-35 Months IM 11/11/2015    Influenza Split High Dose Preservative Free IM 11/23/2016, 11/27/2017    Influenza TIV (IM) 11/21/2012    Pneumococcal Conjugate 13-Valent 11/11/2015    Pneumococcal Polysaccharide PPV23 04/11/2014, 11/11/2015      Health Maintenance:         Topic Date Due    Hepatitis C Screening  1950    CRC Screening: Colonoscopy  07/02/2019    MAMMOGRAM  10/25/2019         Topic Date Due    DTaP,Tdap,and Td Vaccines (1 - Tdap) 12/06/1969    Pneumococcal Vaccine: 65+ Years (2 of 2 - PCV13) 11/11/2016    INFLUENZA VACCINE  07/01/2019      Medicare Health Risk Assessment:     /82 (BP Location: Left arm, Patient Position: Sitting, Cuff Size: Adult)   Pulse 90   Resp 15   Ht 5' 6" (1 676 m)   Wt 74 kg (163 lb 3 2 oz)   SpO2 97%   BMI 26 34 kg/m² Donna Gavin is here for her Subsequent Wellness visit  Last Medicare Wellness visit information reviewed, patient interviewed and updates made to the record today  Health Risk Assessment:   Patient rates overall health as very good  Patient feels that their physical health rating is same  Eyesight was rated as same  Hearing was rated as slightly worse  Patient feels that their emotional and mental health rating is same  Pain experienced in the last 7 days has been none  Patient states that she has experienced no weight loss or gain in last 6 months  Depression Screening:   PHQ-2 Score: 0      Fall Risk Screening: In the past year, patient has experienced: no history of falling in past year      Urinary Incontinence Screening:   Patient has not leaked urine accidently in the last six months  Home Safety:  Patient does not have trouble with stairs inside or outside of their home  Patient has working smoke alarms and has working carbon monoxide detector  Home safety hazards include: none  Nutrition:   Current diet is Regular, No Added Salt and Limited junk food  Medications:   Patient is not currently taking any over-the-counter supplements  Patient is able to manage medications  Activities of Daily Living (ADLs)/Instrumental Activities of Daily Living (IADLs):   Walk and transfer into and out of bed and chair?: Yes  Dress and groom yourself?: Yes    Bathe or shower yourself?: Yes    Feed yourself? Yes  Do your laundry/housekeeping?: Yes  Manage your money, pay your bills and track your expenses?: Yes  Make your own meals?: Yes    Do your own shopping?: Yes    Previous Hospitalizations:   Any hospitalizations or ED visits within the last 12 months?: No      Advance Care Planning:   Living will: Yes    Durable POA for healthcare:  Yes    Advanced directive: Yes    Five wishes given: No      PREVENTIVE SCREENINGS      Cardiovascular Screening:    General: Screening Not Indicated and History Lipid Disorder      Colorectal Cancer Screening:     General: Screening Current      Breast Cancer Screening:     General: Screening Current      Cervical Cancer Screening:    General: History Cervical Cancer      Lung Cancer Screening:     General: Screening Not Indicated      Dmitriy Oseguera DO

## 2019-11-07 DIAGNOSIS — Z12.39 SCREENING FOR MALIGNANT NEOPLASM OF BREAST: ICD-10-CM

## 2020-05-26 ENCOUNTER — ANNUAL EXAM (OUTPATIENT)
Dept: OBGYN CLINIC | Facility: CLINIC | Age: 70
End: 2020-05-26
Payer: MEDICARE

## 2020-05-26 VITALS
WEIGHT: 161 LBS | SYSTOLIC BLOOD PRESSURE: 120 MMHG | BODY MASS INDEX: 25.99 KG/M2 | TEMPERATURE: 97.7 F | DIASTOLIC BLOOD PRESSURE: 72 MMHG

## 2020-05-26 DIAGNOSIS — Z12.31 ENCOUNTER FOR SCREENING MAMMOGRAM FOR MALIGNANT NEOPLASM OF BREAST: ICD-10-CM

## 2020-05-26 DIAGNOSIS — Z01.419 ENCNTR FOR GYN EXAM (GENERAL) (ROUTINE) W/O ABN FINDINGS: ICD-10-CM

## 2020-05-26 PROBLEM — R73.03 PREDIABETES: Status: ACTIVE | Noted: 2017-11-27

## 2020-05-26 PROCEDURE — G0101 CA SCREEN;PELVIC/BREAST EXAM: HCPCS | Performed by: PHYSICIAN ASSISTANT

## 2020-06-16 LAB
25(OH)D3 SERPL-MCNC: 54 NG/ML (ref 30–100)
ALBUMIN SERPL-MCNC: 4.1 G/DL (ref 3.6–5.1)
ALBUMIN/GLOB SERPL: 1.5 (CALC) (ref 1–2.5)
ALP SERPL-CCNC: 70 U/L (ref 37–153)
ALT SERPL-CCNC: 17 U/L (ref 6–29)
AST SERPL-CCNC: 16 U/L (ref 10–35)
BASOPHILS # BLD AUTO: 59 CELLS/UL (ref 0–200)
BASOPHILS NFR BLD AUTO: 1.2 %
BILIRUB SERPL-MCNC: 0.7 MG/DL (ref 0.2–1.2)
BUN SERPL-MCNC: 18 MG/DL (ref 7–25)
BUN/CREAT SERPL: 19 (CALC) (ref 6–22)
CALCIUM SERPL-MCNC: 9.3 MG/DL (ref 8.6–10.4)
CHLORIDE SERPL-SCNC: 106 MMOL/L (ref 98–110)
CHOLEST SERPL-MCNC: 192 MG/DL
CHOLEST/HDLC SERPL: 3.1 (CALC)
CO2 SERPL-SCNC: 30 MMOL/L (ref 20–32)
CREAT SERPL-MCNC: 0.96 MG/DL (ref 0.6–0.93)
EOSINOPHIL # BLD AUTO: 230 CELLS/UL (ref 15–500)
EOSINOPHIL NFR BLD AUTO: 4.7 %
ERYTHROCYTE [DISTWIDTH] IN BLOOD BY AUTOMATED COUNT: 13 % (ref 11–15)
GLOBULIN SER CALC-MCNC: 2.8 G/DL (CALC) (ref 1.9–3.7)
GLUCOSE SERPL-MCNC: 87 MG/DL (ref 65–99)
HBA1C MFR BLD: 5.8 % OF TOTAL HGB
HCT VFR BLD AUTO: 47.1 % (ref 35–45)
HDLC SERPL-MCNC: 61 MG/DL
HGB BLD-MCNC: 15.6 G/DL (ref 11.7–15.5)
LDLC SERPL CALC-MCNC: 109 MG/DL (CALC)
LYMPHOCYTES # BLD AUTO: 1299 CELLS/UL (ref 850–3900)
LYMPHOCYTES NFR BLD AUTO: 26.5 %
MCH RBC QN AUTO: 30.5 PG (ref 27–33)
MCHC RBC AUTO-ENTMCNC: 33.1 G/DL (ref 32–36)
MCV RBC AUTO: 92 FL (ref 80–100)
MONOCYTES # BLD AUTO: 368 CELLS/UL (ref 200–950)
MONOCYTES NFR BLD AUTO: 7.5 %
NEUTROPHILS # BLD AUTO: 2945 CELLS/UL (ref 1500–7800)
NEUTROPHILS NFR BLD AUTO: 60.1 %
NONHDLC SERPL-MCNC: 131 MG/DL (CALC)
PLATELET # BLD AUTO: 313 THOUSAND/UL (ref 140–400)
PMV BLD REES-ECKER: 9.7 FL (ref 7.5–12.5)
POTASSIUM SERPL-SCNC: 4.5 MMOL/L (ref 3.5–5.3)
PROT SERPL-MCNC: 6.9 G/DL (ref 6.1–8.1)
RBC # BLD AUTO: 5.12 MILLION/UL (ref 3.8–5.1)
SL AMB EGFR AFRICAN AMERICAN: 69 ML/MIN/1.73M2
SL AMB EGFR NON AFRICAN AMERICAN: 60 ML/MIN/1.73M2
SODIUM SERPL-SCNC: 141 MMOL/L (ref 135–146)
T4 FREE SERPL-MCNC: 1 NG/DL (ref 0.8–1.8)
TRIGL SERPL-MCNC: 110 MG/DL
TSH SERPL-ACNC: 6.76 MIU/L (ref 0.4–4.5)
WBC # BLD AUTO: 4.9 THOUSAND/UL (ref 3.8–10.8)

## 2020-10-05 DIAGNOSIS — E78.2 MIXED HYPERLIPIDEMIA: ICD-10-CM

## 2020-10-05 RX ORDER — ATORVASTATIN CALCIUM 10 MG/1
10 TABLET, FILM COATED ORAL DAILY
Qty: 90 TABLET | Refills: 3 | Status: SHIPPED | OUTPATIENT
Start: 2020-10-05 | End: 2021-10-01

## 2020-10-20 ENCOUNTER — OFFICE VISIT (OUTPATIENT)
Dept: INTERNAL MEDICINE CLINIC | Facility: CLINIC | Age: 70
End: 2020-10-20
Payer: MEDICARE

## 2020-10-20 VITALS
HEART RATE: 88 BPM | HEIGHT: 66 IN | SYSTOLIC BLOOD PRESSURE: 122 MMHG | WEIGHT: 162.4 LBS | DIASTOLIC BLOOD PRESSURE: 82 MMHG | OXYGEN SATURATION: 96 % | BODY MASS INDEX: 26.1 KG/M2

## 2020-10-20 DIAGNOSIS — Z00.00 MEDICARE ANNUAL WELLNESS VISIT, SUBSEQUENT: ICD-10-CM

## 2020-10-20 DIAGNOSIS — K21.9 GASTROESOPHAGEAL REFLUX DISEASE, UNSPECIFIED WHETHER ESOPHAGITIS PRESENT: Primary | ICD-10-CM

## 2020-10-20 DIAGNOSIS — E55.9 VITAMIN D DEFICIENCY: ICD-10-CM

## 2020-10-20 DIAGNOSIS — E03.9 ACQUIRED HYPOTHYROIDISM: ICD-10-CM

## 2020-10-20 DIAGNOSIS — M85.89 OSTEOPENIA OF MULTIPLE SITES: ICD-10-CM

## 2020-10-20 DIAGNOSIS — E78.01 FAMILIAL HYPERCHOLESTEROLEMIA: ICD-10-CM

## 2020-10-20 DIAGNOSIS — Z23 ENCOUNTER FOR IMMUNIZATION: ICD-10-CM

## 2020-10-20 DIAGNOSIS — Z01.00 ENCOUNTER FOR COMPLETE EYE EXAM: ICD-10-CM

## 2020-10-20 DIAGNOSIS — Z12.11 SCREENING FOR COLON CANCER: ICD-10-CM

## 2020-10-20 PROCEDURE — G0008 ADMIN INFLUENZA VIRUS VAC: HCPCS

## 2020-10-20 PROCEDURE — 99214 OFFICE O/P EST MOD 30 MIN: CPT | Performed by: INTERNAL MEDICINE

## 2020-10-20 PROCEDURE — 90662 IIV NO PRSV INCREASED AG IM: CPT

## 2020-10-20 PROCEDURE — G0439 PPPS, SUBSEQ VISIT: HCPCS | Performed by: INTERNAL MEDICINE

## 2020-10-20 RX ORDER — PANTOPRAZOLE SODIUM 40 MG/1
40 TABLET, DELAYED RELEASE ORAL
Qty: 30 TABLET | Refills: 0 | Status: SHIPPED | OUTPATIENT
Start: 2020-10-20 | End: 2020-11-12

## 2020-11-11 DIAGNOSIS — Z12.31 ENCOUNTER FOR SCREENING MAMMOGRAM FOR MALIGNANT NEOPLASM OF BREAST: ICD-10-CM

## 2020-11-12 DIAGNOSIS — K21.9 GASTROESOPHAGEAL REFLUX DISEASE, UNSPECIFIED WHETHER ESOPHAGITIS PRESENT: ICD-10-CM

## 2020-11-12 RX ORDER — PANTOPRAZOLE SODIUM 40 MG/1
TABLET, DELAYED RELEASE ORAL
Qty: 30 TABLET | Refills: 0 | Status: SHIPPED | OUTPATIENT
Start: 2020-11-12 | End: 2020-12-13

## 2020-12-04 ENCOUNTER — OFFICE VISIT (OUTPATIENT)
Dept: OBGYN CLINIC | Facility: CLINIC | Age: 70
End: 2020-12-04
Payer: MEDICARE

## 2020-12-04 VITALS — BODY MASS INDEX: 26.79 KG/M2 | DIASTOLIC BLOOD PRESSURE: 80 MMHG | SYSTOLIC BLOOD PRESSURE: 138 MMHG | WEIGHT: 166 LBS

## 2020-12-04 DIAGNOSIS — R19.00 ABDOMINAL WALL BULGE: Primary | ICD-10-CM

## 2020-12-04 DIAGNOSIS — R10.2 PELVIC PAIN: ICD-10-CM

## 2020-12-04 PROCEDURE — 99213 OFFICE O/P EST LOW 20 MIN: CPT | Performed by: PHYSICIAN ASSISTANT

## 2020-12-07 ENCOUNTER — CONSULT (OUTPATIENT)
Dept: GASTROENTEROLOGY | Facility: AMBULARY SURGERY CENTER | Age: 70
End: 2020-12-07
Payer: MEDICARE

## 2020-12-07 VITALS — BODY MASS INDEX: 25.97 KG/M2 | WEIGHT: 161.6 LBS | HEIGHT: 66 IN

## 2020-12-07 DIAGNOSIS — K21.9 GASTROESOPHAGEAL REFLUX DISEASE, UNSPECIFIED WHETHER ESOPHAGITIS PRESENT: ICD-10-CM

## 2020-12-07 DIAGNOSIS — Z12.11 SCREENING FOR COLON CANCER: Primary | ICD-10-CM

## 2020-12-07 PROCEDURE — 99204 OFFICE O/P NEW MOD 45 MIN: CPT | Performed by: INTERNAL MEDICINE

## 2020-12-11 ENCOUNTER — HOSPITAL ENCOUNTER (OUTPATIENT)
Dept: ULTRASOUND IMAGING | Facility: HOSPITAL | Age: 70
Discharge: HOME | End: 2020-12-11
Payer: MEDICARE

## 2020-12-11 DIAGNOSIS — R19.00 ABDOMINAL WALL BULGE: ICD-10-CM

## 2020-12-11 PROCEDURE — 76705 ECHO EXAM OF ABDOMEN: CPT

## 2020-12-13 DIAGNOSIS — K21.9 GASTROESOPHAGEAL REFLUX DISEASE, UNSPECIFIED WHETHER ESOPHAGITIS PRESENT: ICD-10-CM

## 2020-12-13 RX ORDER — PANTOPRAZOLE SODIUM 40 MG/1
TABLET, DELAYED RELEASE ORAL
Qty: 30 TABLET | Refills: 0 | Status: SHIPPED | OUTPATIENT
Start: 2020-12-13 | End: 2021-01-05

## 2020-12-21 ENCOUNTER — TRANSCRIBE ORDERS (OUTPATIENT)
Dept: SURGERY | Facility: CLINIC | Age: 70
End: 2020-12-21

## 2020-12-21 ENCOUNTER — CONSULT (OUTPATIENT)
Dept: SURGERY | Facility: CLINIC | Age: 70
End: 2020-12-21
Payer: MEDICARE

## 2020-12-21 VITALS
TEMPERATURE: 96.8 F | SYSTOLIC BLOOD PRESSURE: 132 MMHG | BODY MASS INDEX: 25.07 KG/M2 | WEIGHT: 156 LBS | DIASTOLIC BLOOD PRESSURE: 76 MMHG | HEIGHT: 66 IN | HEART RATE: 83 BPM

## 2020-12-21 DIAGNOSIS — K40.90 NON-RECURRENT UNILATERAL INGUINAL HERNIA WITHOUT OBSTRUCTION OR GANGRENE: Primary | ICD-10-CM

## 2020-12-21 DIAGNOSIS — Z01.818 PRE-OP EVALUATION: Primary | ICD-10-CM

## 2020-12-21 PROCEDURE — 99203 OFFICE O/P NEW LOW 30 MIN: CPT | Performed by: SURGERY

## 2020-12-21 NOTE — H&P (VIEW-ONLY)
Assessment/Plan:   Jaxon Nielsen is a 65 y  o female who is here for: inguinal hernia , right    Plan:  open with mesh   repair of Right inguinal hernia, easily reducible, possible laparoscopic robotic assisted      Post Op Pain Management: Norco    Preoperative Clearance: None            - Patient has been instructed to avoid herbs or non-directed vitamins the week prior to surgery to ensure no drug interactions with perioperative surgical and anesthetic medications  - Patient should continue beta-blocker medication up through and including the day of surgery but hold any other hypertensive medications, including diuretics, unless instructed by PCP or anesthesia  - Patient has been instructed to avoid aspirin containing medications or non-steroidal anti-inflammatory drugs for SEVEN days preceding surgery  ______________________________________________________  CC:  Chief Complaint   Patient presents with    Inguinal Hernia     HPI:  Jaxon Nielsen is a 65 y  o female who was referred for evaluation of Inguinal Hernia    Currently complaining of initial     right inguinal hernia worse with bending, coughing, lifting, standing, walking,   no nausea and no vomiting,     Duration of pain: Intermittent over the past month        regular bowel movement  Prior abdominal surgery: Yes:  hysterectomny      ROS:  General ROS: negative  negative for - chills, fatigue, fever or night sweats, weight loss  Respiratory ROS: no cough, shortness of breath, or wheezing  Cardiovascular ROS: no chest pain or dyspnea on exertion  Genito-Urinary ROS: no dysuria, trouble voiding, or hematuria  Musculoskeletal ROS: negative for - gait disturbance, joint pain or muscle pain  Neurological ROS: no TIA or stroke symptoms  GI ROS: see HPI  Skin ROS: no new rashes or lesions   Lymphatic ROS: no new adenopathy noted by pt     GYN ROS: see HPI, no new GYN history or bleeding noted  Psy ROS: no new mental or behavioral disturbances Patient Active Problem List   Diagnosis    Diverticulosis    Esophageal reflux    Hyperlipidemia    Osteopenia    Vitamin D deficiency    Prediabetes    Screening for colon cancer       Allergies:  Codeine      Current Outpatient Medications:     atorvastatin (LIPITOR) 10 mg tablet, Take 1 tablet (10 mg total) by mouth daily, Disp: 90 tablet, Rfl: 3    Calcium Carb-Cholecalciferol (CALCIUM 1000 + D) 1000-800 MG-UNIT TABS, Take by mouth, Disp: , Rfl:     cholecalciferol (VITAMIN D3) 1,000 units tablet, Take 3 tablets by mouth daily, Disp: , Rfl:     pantoprazole (PROTONIX) 40 mg tablet, TAKE 1 TABLET BY MOUTH DAILY BEFORE BREAKFAST, Disp: 30 tablet, Rfl: 0    Probiotic Product (PROBIOTIC-10) CAPS, Take by mouth, Disp: , Rfl:     Na Sulfate-K Sulfate-Mg Sulf 17 5-3 13-1 6 GM/177ML SOLN, Take 1 applicator by mouth once for 1 dose, Disp: 1 Bottle, Rfl: 0    Past Medical History:   Diagnosis Date    [facility]     last assessed [facility]    Ear problems     HL (hearing loss)     Hyperlipemia     Tinnitus        Past Surgical History:   Procedure Laterality Date    BREAST BIOPSY Left     COLONOSCOPY      (Fiberoptic)    HYSTERECTOMY         Family History   Problem Relation Age of Onset    Breast cancer Mother     Ovarian cancer Maternal Grandmother     Breast cancer Family     Thyroid disease Family     Breast cancer Paternal Aunt     Breast cancer Cousin         reports that she has never smoked  She has never used smokeless tobacco  She reports current alcohol use  She reports that she does not use drugs  Vitals:    12/21/20 0822   BP: 132/76   Pulse: 83   Temp: (!) 96 8 °F (36 °C)        PHYSICAL EXAM  General Appearance:    Alert, cooperative, no distress      Head:    Normocephalic without obvious abnormality   Eyes:    PERRL, conjunctiva/corneas clear, EOM's intact        Neck:   Supple, no adenopathy, no JVD   Back:     Symmetric, no spinal or CVA tenderness   Lungs: Clear to auscultation bilaterally, no wheezing or rhonchi   Heart:    Regular  rate and rhythm, S1 and S2 normal, no murmur     Abdomen:     abdomen is soft without significant tenderness, masses, organomegaly or guardingBowel sounds are normal     right sided inguinal hernia     Extremities:   Extremities normal  No clubbing, cyanosis or edema   Psych:   Normal Affect, AOx3  Neurologic:  Skin:   CNII-XII intact  Strength symmetric, speech intact    Warm, dry, intact, no visible rashes or lesions             Informed consent for procedure was personally discussed, reviewed, and signed by Dr Aimee Newell  Discussion by Dr Aimee Newell was carried out regarding risks, benefits, and alternatives with the patient  Risks include but are not limited to:  bleeding, infection, and delayed wound healing or an open wound, pulmonary embolus, leaks from bowel or bile ducts or other viscus, transfusions, death  Discussed in further detail the more common complications and their rates of occurrence   was used if necessary  Patient expressed understanding of the issues discussed and wished/consented to proceed  All questions were answered by Dr Aimee Newell  Discussion performed between patient and the provider signing below  Signature:   Signature:   Annell Severs, PA-C  Date: 12/21/2020 Time: 8:41 AM       Some portions of this record may have been generated with voice recognition software  There may be translation, syntax,  or grammatical errors  Occasional wrong word or "sound-a-like" substitutions may have occurred due to the inherent limitations of the voice recognition software  Read the chart carefully and recognize, using context, where substitutions may have occurred  If you have any questions, please contact the dictating provider for clarification or correction, as needed  This encounter has been coded by a non-certified coder

## 2020-12-30 NOTE — PRE-PROCEDURE INSTRUCTIONS
Pre-Surgery Instructions:    Have you had / have a sore throat? NO  have you had / have a cough less than 1 week? NO  Have you had / have a fever greater than 100 0 - 100  4? NO  Are you experiencing any shortness of breath? NO    Pre Procedure instructions given and verbalized understanding  , instructions reviewed with understanding at this time   Medication Instructions    atorvastatin (LIPITOR) 10 mg tablet Instructed patient per Anesthesia Guidelines   Calcium Carb-Cholecalciferol (CALCIUM 1000 + D) 1000-800 MG-UNIT TABS Instructed patient per Anesthesia Guidelines  stopping 12/31    cholecalciferol (VITAMIN D3) 1,000 units tablet Instructed patient per Anesthesia Guidelines  stopping 12/31    pantoprazole (PROTONIX) 40 mg tablet Instructed patient per Anesthesia Guidelines   Probiotic Product (PROBIOTIC-10) CAPS Instructed patient per Anesthesia Guidelines  stopping 12/31

## 2020-12-31 LAB
ALBUMIN SERPL-MCNC: 4.2 G/DL (ref 3.6–5.1)
ALBUMIN/GLOB SERPL: 1.7 (CALC) (ref 1–2.5)
ALP SERPL-CCNC: 74 U/L (ref 37–153)
ALT SERPL-CCNC: 21 U/L (ref 6–29)
AST SERPL-CCNC: 20 U/L (ref 10–35)
BASOPHILS # BLD AUTO: 40 CELLS/UL (ref 0–200)
BASOPHILS NFR BLD AUTO: 0.8 %
BILIRUB SERPL-MCNC: 0.5 MG/DL (ref 0.2–1.2)
BUN SERPL-MCNC: 22 MG/DL (ref 7–25)
BUN/CREAT SERPL: ABNORMAL (CALC) (ref 6–22)
CALCIUM SERPL-MCNC: 9.5 MG/DL (ref 8.6–10.4)
CHLORIDE SERPL-SCNC: 103 MMOL/L (ref 98–110)
CO2 SERPL-SCNC: 31 MMOL/L (ref 20–32)
CREAT SERPL-MCNC: 0.93 MG/DL (ref 0.6–0.93)
EOSINOPHIL # BLD AUTO: 150 CELLS/UL (ref 15–500)
EOSINOPHIL NFR BLD AUTO: 3 %
ERYTHROCYTE [DISTWIDTH] IN BLOOD BY AUTOMATED COUNT: 12.7 % (ref 11–15)
GLOBULIN SER CALC-MCNC: 2.5 G/DL (CALC) (ref 1.9–3.7)
GLUCOSE SERPL-MCNC: 103 MG/DL (ref 65–99)
HCT VFR BLD AUTO: 48.6 % (ref 35–45)
HGB BLD-MCNC: 15.8 G/DL (ref 11.7–15.5)
LYMPHOCYTES # BLD AUTO: 1205 CELLS/UL (ref 850–3900)
LYMPHOCYTES NFR BLD AUTO: 24.1 %
MCH RBC QN AUTO: 30 PG (ref 27–33)
MCHC RBC AUTO-ENTMCNC: 32.5 G/DL (ref 32–36)
MCV RBC AUTO: 92.4 FL (ref 80–100)
MONOCYTES # BLD AUTO: 280 CELLS/UL (ref 200–950)
MONOCYTES NFR BLD AUTO: 5.6 %
NEUTROPHILS # BLD AUTO: 3325 CELLS/UL (ref 1500–7800)
NEUTROPHILS NFR BLD AUTO: 66.5 %
PLATELET # BLD AUTO: 305 THOUSAND/UL (ref 140–400)
PMV BLD REES-ECKER: 9.7 FL (ref 7.5–12.5)
POTASSIUM SERPL-SCNC: 4.3 MMOL/L (ref 3.5–5.3)
PROT SERPL-MCNC: 6.7 G/DL (ref 6.1–8.1)
RBC # BLD AUTO: 5.26 MILLION/UL (ref 3.8–5.1)
SL AMB EGFR AFRICAN AMERICAN: 71 ML/MIN/1.73M2
SL AMB EGFR NON AFRICAN AMERICAN: 61 ML/MIN/1.73M2
SODIUM SERPL-SCNC: 140 MMOL/L (ref 135–146)
WBC # BLD AUTO: 5 THOUSAND/UL (ref 3.8–10.8)

## 2021-01-02 ENCOUNTER — OFFICE VISIT (OUTPATIENT)
Dept: LAB | Facility: CLINIC | Age: 71
End: 2021-01-02
Payer: MEDICARE

## 2021-01-02 DIAGNOSIS — Z01.818 PRE-OP EVALUATION: ICD-10-CM

## 2021-01-02 PROCEDURE — 93005 ELECTROCARDIOGRAM TRACING: CPT

## 2021-01-04 LAB
ATRIAL RATE: 69 BPM
P AXIS: 41 DEGREES
PR INTERVAL: 138 MS
QRS AXIS: 49 DEGREES
QRSD INTERVAL: 76 MS
QT INTERVAL: 378 MS
QTC INTERVAL: 405 MS
T WAVE AXIS: 24 DEGREES
VENTRICULAR RATE: 69 BPM

## 2021-01-04 PROCEDURE — 93010 ELECTROCARDIOGRAM REPORT: CPT | Performed by: INTERNAL MEDICINE

## 2021-01-05 ENCOUNTER — ANESTHESIA EVENT (OUTPATIENT)
Dept: PERIOP | Facility: HOSPITAL | Age: 71
End: 2021-01-05
Payer: MEDICARE

## 2021-01-05 DIAGNOSIS — K21.9 GASTROESOPHAGEAL REFLUX DISEASE, UNSPECIFIED WHETHER ESOPHAGITIS PRESENT: ICD-10-CM

## 2021-01-05 RX ORDER — PANTOPRAZOLE SODIUM 40 MG/1
TABLET, DELAYED RELEASE ORAL
Qty: 30 TABLET | Refills: 0 | Status: ON HOLD | OUTPATIENT
Start: 2021-01-05 | End: 2021-01-27 | Stop reason: SDUPTHER

## 2021-01-06 ENCOUNTER — HOSPITAL ENCOUNTER (OUTPATIENT)
Facility: HOSPITAL | Age: 71
Setting detail: OUTPATIENT SURGERY
Discharge: HOME | End: 2021-01-06
Attending: SURGERY | Admitting: SURGERY
Payer: MEDICARE

## 2021-01-06 ENCOUNTER — ANESTHESIA (OUTPATIENT)
Dept: PERIOP | Facility: HOSPITAL | Age: 71
End: 2021-01-06
Payer: MEDICARE

## 2021-01-06 VITALS
RESPIRATION RATE: 18 BRPM | TEMPERATURE: 96.5 F | DIASTOLIC BLOOD PRESSURE: 72 MMHG | HEIGHT: 66 IN | BODY MASS INDEX: 25.07 KG/M2 | OXYGEN SATURATION: 94 % | SYSTOLIC BLOOD PRESSURE: 152 MMHG | WEIGHT: 156 LBS | HEART RATE: 88 BPM

## 2021-01-06 VITALS — HEART RATE: 95 BPM

## 2021-01-06 DIAGNOSIS — K40.90 NON-RECURRENT UNILATERAL INGUINAL HERNIA WITHOUT OBSTRUCTION OR GANGRENE: Primary | ICD-10-CM

## 2021-01-06 PROCEDURE — C1781 MESH (IMPLANTABLE): HCPCS | Performed by: SURGERY

## 2021-01-06 PROCEDURE — 49650 LAP ING HERNIA REPAIR INIT: CPT | Performed by: SURGERY

## 2021-01-06 DEVICE — BARD 3DMAX MESH RIGHT LARGE
Type: IMPLANTABLE DEVICE | Site: INGUINAL | Status: FUNCTIONAL
Brand: BARD 3DMAX MESH

## 2021-01-06 RX ORDER — SODIUM CHLORIDE 9 MG/ML
125 INJECTION, SOLUTION INTRAVENOUS CONTINUOUS
Status: DISCONTINUED | OUTPATIENT
Start: 2021-01-06 | End: 2021-01-06 | Stop reason: HOSPADM

## 2021-01-06 RX ORDER — CEFAZOLIN SODIUM 2 G/50ML
2000 SOLUTION INTRAVENOUS ONCE
Status: COMPLETED | OUTPATIENT
Start: 2021-01-06 | End: 2021-01-06

## 2021-01-06 RX ORDER — ONDANSETRON 2 MG/ML
4 INJECTION INTRAMUSCULAR; INTRAVENOUS EVERY 8 HOURS PRN
Status: DISCONTINUED | OUTPATIENT
Start: 2021-01-06 | End: 2021-01-06 | Stop reason: HOSPADM

## 2021-01-06 RX ORDER — ACETAMINOPHEN 325 MG/1
650 TABLET ORAL EVERY 6 HOURS PRN
Status: DISCONTINUED | OUTPATIENT
Start: 2021-01-06 | End: 2021-01-06 | Stop reason: HOSPADM

## 2021-01-06 RX ORDER — GLYCOPYRROLATE 0.2 MG/ML
INJECTION INTRAMUSCULAR; INTRAVENOUS AS NEEDED
Status: DISCONTINUED | OUTPATIENT
Start: 2021-01-06 | End: 2021-01-06

## 2021-01-06 RX ORDER — DEXAMETHASONE SODIUM PHOSPHATE 10 MG/ML
INJECTION, SOLUTION INTRAMUSCULAR; INTRAVENOUS AS NEEDED
Status: DISCONTINUED | OUTPATIENT
Start: 2021-01-06 | End: 2021-01-06

## 2021-01-06 RX ORDER — IBUPROFEN 600 MG/1
600 TABLET ORAL ONCE
Status: COMPLETED | OUTPATIENT
Start: 2021-01-06 | End: 2021-01-06

## 2021-01-06 RX ORDER — HYDROMORPHONE HCL/PF 1 MG/ML
0.5 SYRINGE (ML) INJECTION
Status: DISCONTINUED | OUTPATIENT
Start: 2021-01-06 | End: 2021-01-06 | Stop reason: HOSPADM

## 2021-01-06 RX ORDER — IBUPROFEN 800 MG/1
800 TABLET ORAL EVERY 6 HOURS PRN
Qty: 30 TABLET | Refills: 0 | Status: SHIPPED | OUTPATIENT
Start: 2021-01-06 | End: 2021-01-27 | Stop reason: HOSPADM

## 2021-01-06 RX ORDER — LIDOCAINE HYDROCHLORIDE 10 MG/ML
INJECTION, SOLUTION EPIDURAL; INFILTRATION; INTRACAUDAL; PERINEURAL AS NEEDED
Status: DISCONTINUED | OUTPATIENT
Start: 2021-01-06 | End: 2021-01-06

## 2021-01-06 RX ORDER — FENTANYL CITRATE/PF 50 MCG/ML
50 SYRINGE (ML) INJECTION
Status: DISCONTINUED | OUTPATIENT
Start: 2021-01-06 | End: 2021-01-06 | Stop reason: HOSPADM

## 2021-01-06 RX ORDER — ONDANSETRON 2 MG/ML
4 INJECTION INTRAMUSCULAR; INTRAVENOUS ONCE AS NEEDED
Status: DISCONTINUED | OUTPATIENT
Start: 2021-01-06 | End: 2021-01-06 | Stop reason: HOSPADM

## 2021-01-06 RX ORDER — PROPOFOL 10 MG/ML
INJECTION, EMULSION INTRAVENOUS AS NEEDED
Status: DISCONTINUED | OUTPATIENT
Start: 2021-01-06 | End: 2021-01-06

## 2021-01-06 RX ORDER — SODIUM CHLORIDE 9 MG/ML
INJECTION, SOLUTION INTRAVENOUS CONTINUOUS PRN
Status: DISCONTINUED | OUTPATIENT
Start: 2021-01-06 | End: 2021-01-06

## 2021-01-06 RX ORDER — ONDANSETRON 4 MG/1
4 TABLET, ORALLY DISINTEGRATING ORAL EVERY 8 HOURS PRN
Status: DISCONTINUED | OUTPATIENT
Start: 2021-01-06 | End: 2021-01-06 | Stop reason: HOSPADM

## 2021-01-06 RX ORDER — NEOSTIGMINE METHYLSULFATE 1 MG/ML
INJECTION INTRAVENOUS AS NEEDED
Status: DISCONTINUED | OUTPATIENT
Start: 2021-01-06 | End: 2021-01-06

## 2021-01-06 RX ORDER — DEXTROSE AND SODIUM CHLORIDE 5; .45 G/100ML; G/100ML
80 INJECTION, SOLUTION INTRAVENOUS CONTINUOUS
Status: DISCONTINUED | OUTPATIENT
Start: 2021-01-06 | End: 2021-01-06 | Stop reason: HOSPADM

## 2021-01-06 RX ORDER — ROCURONIUM BROMIDE 10 MG/ML
INJECTION, SOLUTION INTRAVENOUS AS NEEDED
Status: DISCONTINUED | OUTPATIENT
Start: 2021-01-06 | End: 2021-01-06

## 2021-01-06 RX ORDER — HYDROCODONE BITARTRATE AND ACETAMINOPHEN 5; 325 MG/1; MG/1
1 TABLET ORAL EVERY 6 HOURS PRN
Qty: 5 TABLET | Refills: 0 | Status: SHIPPED | OUTPATIENT
Start: 2021-01-06 | End: 2021-01-27 | Stop reason: HOSPADM

## 2021-01-06 RX ORDER — HYDROCODONE BITARTRATE AND ACETAMINOPHEN 5; 325 MG/1; MG/1
1 TABLET ORAL EVERY 4 HOURS PRN
Status: DISCONTINUED | OUTPATIENT
Start: 2021-01-06 | End: 2021-01-06 | Stop reason: HOSPADM

## 2021-01-06 RX ORDER — ONDANSETRON 2 MG/ML
INJECTION INTRAMUSCULAR; INTRAVENOUS AS NEEDED
Status: DISCONTINUED | OUTPATIENT
Start: 2021-01-06 | End: 2021-01-06

## 2021-01-06 RX ORDER — MAGNESIUM HYDROXIDE 1200 MG/15ML
LIQUID ORAL AS NEEDED
Status: DISCONTINUED | OUTPATIENT
Start: 2021-01-06 | End: 2021-01-06 | Stop reason: HOSPADM

## 2021-01-06 RX ORDER — FENTANYL CITRATE 50 UG/ML
INJECTION, SOLUTION INTRAMUSCULAR; INTRAVENOUS AS NEEDED
Status: DISCONTINUED | OUTPATIENT
Start: 2021-01-06 | End: 2021-01-06

## 2021-01-06 RX ORDER — MIDAZOLAM HYDROCHLORIDE 2 MG/2ML
INJECTION, SOLUTION INTRAMUSCULAR; INTRAVENOUS AS NEEDED
Status: DISCONTINUED | OUTPATIENT
Start: 2021-01-06 | End: 2021-01-06

## 2021-01-06 RX ADMIN — MIDAZOLAM 2 MG: 1 INJECTION INTRAMUSCULAR; INTRAVENOUS at 12:00

## 2021-01-06 RX ADMIN — GLYCOPYRROLATE 0.5 MG: 0.2 INJECTION, SOLUTION INTRAMUSCULAR; INTRAVENOUS at 14:11

## 2021-01-06 RX ADMIN — FENTANYL CITRATE 25 MCG: 50 INJECTION, SOLUTION INTRAMUSCULAR; INTRAVENOUS at 14:26

## 2021-01-06 RX ADMIN — SODIUM CHLORIDE: 0.9 INJECTION, SOLUTION INTRAVENOUS at 12:30

## 2021-01-06 RX ADMIN — ONDANSETRON 4 MG: 2 INJECTION INTRAMUSCULAR; INTRAVENOUS at 12:25

## 2021-01-06 RX ADMIN — SODIUM CHLORIDE: 0.9 INJECTION, SOLUTION INTRAVENOUS at 14:10

## 2021-01-06 RX ADMIN — NEOSTIGMINE METHYLSULFATE 4 MG: 1 INJECTION, SOLUTION INTRAVENOUS at 14:11

## 2021-01-06 RX ADMIN — DEXAMETHASONE SODIUM PHOSPHATE 4 MG: 10 INJECTION, SOLUTION INTRAMUSCULAR; INTRAVENOUS at 12:25

## 2021-01-06 RX ADMIN — PROPOFOL 150 MG: 10 INJECTION, EMULSION INTRAVENOUS at 12:07

## 2021-01-06 RX ADMIN — ROCURONIUM BROMIDE 10 MG: 10 INJECTION, SOLUTION INTRAVENOUS at 13:29

## 2021-01-06 RX ADMIN — CEFAZOLIN SODIUM 2000 MG: 2 SOLUTION INTRAVENOUS at 11:50

## 2021-01-06 RX ADMIN — FENTANYL CITRATE 25 MCG: 50 INJECTION, SOLUTION INTRAMUSCULAR; INTRAVENOUS at 14:01

## 2021-01-06 RX ADMIN — FENTANYL CITRATE 50 MCG: 50 INJECTION INTRAMUSCULAR; INTRAVENOUS at 15:14

## 2021-01-06 RX ADMIN — LIDOCAINE HYDROCHLORIDE 100 MG: 10 INJECTION, SOLUTION EPIDURAL; INFILTRATION; INTRACAUDAL; PERINEURAL at 12:07

## 2021-01-06 RX ADMIN — PHENYLEPHRINE HYDROCHLORIDE 100 MCG: 10 INJECTION INTRAVENOUS at 12:22

## 2021-01-06 RX ADMIN — ONDANSETRON 4 MG: 2 INJECTION INTRAMUSCULAR; INTRAVENOUS at 17:07

## 2021-01-06 RX ADMIN — IBUPROFEN 600 MG: 600 TABLET, FILM COATED ORAL at 16:29

## 2021-01-06 RX ADMIN — FENTANYL CITRATE 100 MCG: 50 INJECTION, SOLUTION INTRAMUSCULAR; INTRAVENOUS at 12:06

## 2021-01-06 RX ADMIN — SODIUM CHLORIDE 125 ML/HR: 0.9 INJECTION, SOLUTION INTRAVENOUS at 11:03

## 2021-01-06 RX ADMIN — FENTANYL CITRATE 50 MCG: 50 INJECTION, SOLUTION INTRAMUSCULAR; INTRAVENOUS at 14:34

## 2021-01-06 RX ADMIN — PHENYLEPHRINE HYDROCHLORIDE 100 MCG: 10 INJECTION INTRAVENOUS at 12:10

## 2021-01-06 RX ADMIN — ROCURONIUM BROMIDE 40 MG: 10 INJECTION, SOLUTION INTRAVENOUS at 12:08

## 2021-01-06 NOTE — OP NOTE
ROBOTIC INGUINAL HERNIA REPAIR  Postoperative Note  PATIENT NAME: Lex Gonzalez  : 1950  MRN: [id number]  AL OR ROOM 08    Surgery Date: 2021    Pre operative diagnosis:   Inguinal hernia [K40 90]    Operative Indications:  Symptomatic right Inguinal Hernia    Consent:  The risks, benefits, and alternatives to the surgery were discussed with the patient and with the family prior to surgery if present, personally by Dr Guadalupe Scott  If the consent was obtained by the physician assistant or other representative, the consent was reviewed once again personally by the operating physician  Common complications particular for this procedure as well as unusual complications were discussed, including but not limited to:  bleeding, wound infection, prolonged wound healing, open wounds, reoperation, leak from the bowel or viscus, leak from the bile duct or injury to adjacent or other organs or blood vessels in the abdomen  If the surgery was laparoscopic, it was discussed that possible open surgery could also occur during that same surgery and is always an option in laparoscopic surgery and/or possible reoperation  A  was used if necessary  The patient expressed understanding of the issues discussed and wished and consented to the procedure to proceed  All questions were answered  Dr Guadalupe Scott personally discussed the informed consent with this patient  Operative Findings:  Large direct defect  Large lipomas     Post Op diagnosis: and findings  Post-Op Diagnosis Codes:     * Inguinal hernia [K40 90]    Procedure:   Procedure(s):  ROBOTIC INGUINAL HERNIA REPAIR        Surgeon: Christiane Schirmer, MD      Surgeon(s) and Role:     * Christiane Schirmer, MD - Primary     * Tonny Clancy MD - resident observer     * Carlos Live PA-C - Assisting 1st assistant      The assistant was medically necessary for surgical safety the case including suturing, retraction, and hemostasis   A qualified resident was NOT  Available to first assist    I provided direct and immediate supervision  I was present for the entire procedure  Procedure Details   The patient was seen again in the Holding Room  The risks, benefits, complications, treatment options, and expected outcomes were discussed with the patient  The possibilities of reaction to medication, pulmonary aspiration, perforation of viscus, bleeding, postoperative short or long term nerve entrapment causing pain,recurrent infection, the need for additional procedures, and development of a complication requiring transfusion or further operation were discussed with the patient and/or family  There was concurrence with the proposed plan, and informed consent was obtained  The site of surgery was properly noted/marked  The patient was taken to the Operating Room, identified as Venecia Alvarenga and the procedure verified as hernia repair  A Time Out was held and the above information confirmed  The patient was prepped and draped in a sterile fashion  A timeout was again performed  Local anesthesia was used in the incision  An supra umbilical incision was made  Dissection carried out to the fascia which was grasped with Kocher's and elevated  The abdomen was entered under direct vision  Two additional 8 mm robotic trocars were placed lateral to rectus muscle approximately at the level of the umbilicus  At this point the patient was placed into Trendelenburg position and noted to have right inguinal hernia   The robotic arms were docked and the robot docked  The camera inserted  The peritoneum was incised from the medial umbilical fold out laterally past the internal ring on the right  Using blunt dissectors, the superior flap was dissected  Next, the direct space was mobilized by exposing West's ligament all the way along its length to the pubic tubercle  If a direct hernia defect was seen this was dissected and reduced   If there was indirect hernia sac this was carefully mobilized off the cord structures with care to avoid injury to the gonadal vessels or spermatic cord  The remainder of the inferior flap was created  At this point, Bard 3D Max Large mesh was selected and placed into the preperitoneal space  The mesh was secured inferiorly at West's  Medially at the pubic tubercle, and laterally at the anterior abdominal wall  Of note no clips were placed   The peritoneum was closed using  a running Bard suture         The supra umbilical trocor site was closed with an additional 0-PDS  The pneumoperitoneum was released, after undocking the robot and opening the ports  The ports were then all removed  The wounds were closed in multiple layers using 3-0 Vicryl sutures and the skin closed using a 4-0 Monocryl subcuticular stitch  The wound was dressed  The patient was anatomically correct at the end of the procedure  The patient tolerated the procedure in good condition  Instrument, sponge, and needle counts were correct prior to closure and at the conclusion of the case  Signature:   Dio Partida MD  Date: 1/6/2021 Time: 2:15 PM         This text is generated with voice recognition software  There may be translation, syntax,  or grammatical errors  If you have any questions, please contact the dictating provider

## 2021-01-06 NOTE — INTERVAL H&P NOTE
H&P reviewed  After examining the patient I find no changes in the patients condition since the H&P had been written  The robot is available today and the patient prefers to try robotically  She is aware that her previous hysterectomy may make this more difficult and therefore we may need to convert to open  However we will at least start an attempt laparoscopically/robotically and go from there  Informed consent for procedure was personally discussed, reviewed, and signed by Dr Carroll Garcia  Discussion by Dr Carroll Garcia was carried out regarding risks, benefits, and alternatives with the patient  Risks include but are not limited to:  bleeding, infection, and delayed wound healing or an open wound, pulmonary embolus, leaks from bowel or bile ducts or other viscus, transfusions, death  Discussed in further detail the more common complications and their rates of occurrence   was used if necessary  Patient expressed understanding of the issues discussed and wished/consented to proceed  All questions were answered by Dr Carroll Garcia  Discussion performed between patient and the provider signing below       Signature:   Ronnie Salmon MD  Date: 1/6/2021 Time: 11:17 AM         Vitals:    01/06/21 1044   BP: 132/74   Pulse: 78   Resp: 16   Temp: (!) 97 4 °F (36 3 °C)   SpO2: 96%

## 2021-01-06 NOTE — ANESTHESIA PREPROCEDURE EVALUATION
Procedure:  ROBOTIC INGUINAL HERNIA REPAIR (Right Groin)    Relevant Problems   CARDIO   (+) Hyperlipidemia      GI/HEPATIC   (+) Esophageal reflux        Physical Exam    Airway    Mallampati score: II  TM Distance: >3 FB  Neck ROM: full     Dental   No notable dental hx     Cardiovascular  Rhythm: regular, Rate: normal, Cardiovascular exam normal    Pulmonary  Pulmonary exam normal Breath sounds clear to auscultation,     Other Findings        Anesthesia Plan  ASA Score- 2     Anesthesia Type- general with ASA Monitors  Additional Monitors:   Airway Plan: ETT  Plan Factors-Exercise tolerance (METS): >4 METS  Chart reviewed  EKG reviewed  Existing labs reviewed  Patient summary reviewed  Patient is not a current smoker  Patient instructed to abstain from smoking on day of procedure  Patient did not smoke on day of surgery  Induction- intravenous  Postoperative Plan-     Informed Consent- Anesthetic plan and risks discussed with patient

## 2021-01-06 NOTE — DISCHARGE INSTRUCTIONS
Kendra [facility] Instructions  Dr Uma Cardenas MD, FACS    1  General: You will feel pulling sensations around the wound or funny aches and pains around the incisions  This is normal  Even minor surgery is a change in your body and this is your bodys way of reaction to it  If you have had abdominal surgery, it may help to support the incision with a small pillow or blanket for comfort when moving or coughing  2  Wound care: Make sure to remove the bandage in about 24 hours, unless instructed otherwise  You usually don't have to redress the wound after 24-48 hours, unless for comfort  Keep the incision clean and dry  Let air get to it  If this Steri-Strips fall off, just keep the wound clean  3  Water: You may shower over the wound, unless there are drain tubes left in place  Do not bathe or use a pool or hot tub until cleared by the physician  You may shower right over the staples or Steri-Strips and packing dry when you are done  4  Activity: You may go up and down stairs, walk as much as you are comfortable, but walk at least 3 times each day  If you have had abdominal surgery, do not lift anything heavier than 15 pounds for at least 2-4 weeks, unless cleared by the doctor  5  Diet: You may resume a regular diet  If you had a same-day surgery or overnight stay surgery, you may wish to eat lightly for a few days: soups, crackers, and sandwiches  You may resume a regular diet when ready  6  Medications: Resume all of your previous medications, unless told otherwise by the doctor  Avoid aspirin or ibuprofen (Advil, Motrin, etc ) products for 2-3 days after the date of surgery  You may, at that time, began to take them again  Tylenol is always fine, unless you are taking any narcotic pain medication containing Tylenol (such as Percocet, Darvocet, Vicodin, or anything containing acetaminophen)  Do not take Tylenol if you're taking these medications   You do not need to take the narcotic pain medications unless you are having significant pain and discomfort  7  Driving: You will need someone to drive you home on the day of surgery  Do not drive or make any important decisions while on narcotic pain medication or 24 hours and after anesthesia or sedation for surgery  Generally, you may drive when your off all narcotic pain medications  8  Upset Stomach: You may take Maalox, Tums, or similar items for an upset stomach  If your narcotic pain medication causes an upset stomach, do not take it on an empty stomach  Try taking it with at least some crackers or toast      9  Constipation: Patients often experienced constipation after surgery  You may take over-the-counter medication for this, such as Metamucil, Senokot, Dulcolax, milk of magnesia, etc  You may take a suppository unless you have had anorectal surgery such as a procedure on your hemorrhoids  If you experience significant nausea or vomiting after abdominal surgery, call the office before trying any of these medications  10  Call the office: If you are experiencing any of the following, fevers above 101 5°, significant nausea or vomiting, if the wound develops drainage and/or is excessive redness around the wound, or if you have significant diarrhea or other worsening symptoms  11  Pain: You may be given a prescription for pain  This will be given to the hospital, the day of surgery  12  Sexual Activity: You may resume sexual activity when you feel ready and comfortable and your incision is sealed and healed without apparent infection risk  13  Urination: If you haven't urinated in 6 hours, go directly to the ER for evaluation for urinary retention       [address], [address]  [facility], [address]   Phone: [phone]

## 2021-01-06 NOTE — TREATMENT PLAN
Patient stated that she can take Norco/Vicodin and morphine without difficulty  She does not have a true allergy to these medications  I spoke with the patient personally regarding this    Signature:   Calli Whyte MD  Date: 1/6/2021 Time: 2:48 PM

## 2021-01-07 ENCOUNTER — TELEPHONE (OUTPATIENT)
Dept: SURGERY | Facility: CLINIC | Age: 71
End: 2021-01-07

## 2021-01-07 NOTE — TELEPHONE ENCOUNTER
S/P = Hernia repair = 1/6/21    Patient called back  She denies any F/V/N/C and she has had no bowel movement  She is aware this is normal for the first 3 days  Patient is aware she can remove the dressing she can shower  She is aware to let warm water and soap run over the incision  Patient is aware to pat the incision dry and no antibiotic ointments  Patient is aware to call the office with any questions or concerns  No path sent

## 2021-01-07 NOTE — TELEPHONE ENCOUNTER
S/P = Hernia repair = 1/6/21    Unable to reach patient left message to call the office back  No path sent

## 2021-01-18 ENCOUNTER — TELEPHONE (OUTPATIENT)
Dept: SURGERY | Facility: CLINIC | Age: 71
End: 2021-01-18

## 2021-01-18 ENCOUNTER — APPOINTMENT (OUTPATIENT)
Dept: LAB | Facility: CLINIC | Age: 71
DRG: 357 | End: 2021-01-18
Payer: MEDICARE

## 2021-01-18 ENCOUNTER — OFFICE VISIT (OUTPATIENT)
Dept: SURGERY | Facility: CLINIC | Age: 71
End: 2021-01-18

## 2021-01-18 VITALS
HEART RATE: 92 BPM | TEMPERATURE: 96.8 F | DIASTOLIC BLOOD PRESSURE: 72 MMHG | SYSTOLIC BLOOD PRESSURE: 140 MMHG | BODY MASS INDEX: 25.18 KG/M2 | HEIGHT: 66 IN

## 2021-01-18 DIAGNOSIS — K92.1 BLOOD IN STOOL: Primary | ICD-10-CM

## 2021-01-18 DIAGNOSIS — K92.1 BLOOD IN STOOL: ICD-10-CM

## 2021-01-18 LAB
ALBUMIN SERPL BCP-MCNC: 3.6 G/DL (ref 3.5–5)
ALP SERPL-CCNC: 85 U/L (ref 46–116)
ALT SERPL W P-5'-P-CCNC: 67 U/L (ref 12–78)
ANION GAP SERPL CALCULATED.3IONS-SCNC: 6 MMOL/L (ref 4–13)
AST SERPL W P-5'-P-CCNC: 36 U/L (ref 5–45)
BASOPHILS # BLD AUTO: 0.06 THOUSANDS/ΜL (ref 0–0.1)
BASOPHILS NFR BLD AUTO: 1 % (ref 0–1)
BILIRUB SERPL-MCNC: 0.44 MG/DL (ref 0.2–1)
BUN SERPL-MCNC: 24 MG/DL (ref 5–25)
CALCIUM SERPL-MCNC: 9.6 MG/DL (ref 8.3–10.1)
CHLORIDE SERPL-SCNC: 104 MMOL/L (ref 100–108)
CO2 SERPL-SCNC: 29 MMOL/L (ref 21–32)
CREAT SERPL-MCNC: 1.02 MG/DL (ref 0.6–1.3)
EOSINOPHIL # BLD AUTO: 0.08 THOUSAND/ΜL (ref 0–0.61)
EOSINOPHIL NFR BLD AUTO: 1 % (ref 0–6)
ERYTHROCYTE [DISTWIDTH] IN BLOOD BY AUTOMATED COUNT: 14 % (ref 11.6–15.1)
GFR SERPL CREATININE-BSD FRML MDRD: 55 ML/MIN/1.73SQ M
GLUCOSE SERPL-MCNC: 100 MG/DL (ref 65–140)
HCT VFR BLD AUTO: 44.5 % (ref 34.8–46.1)
HGB BLD-MCNC: 14.3 G/DL (ref 11.5–15.4)
IMM GRANULOCYTES # BLD AUTO: 0.07 THOUSAND/UL (ref 0–0.2)
IMM GRANULOCYTES NFR BLD AUTO: 1 % (ref 0–2)
LYMPHOCYTES # BLD AUTO: 1.23 THOUSANDS/ΜL (ref 0.6–4.47)
LYMPHOCYTES NFR BLD AUTO: 11 % (ref 14–44)
MCH RBC QN AUTO: 30.6 PG (ref 26.8–34.3)
MCHC RBC AUTO-ENTMCNC: 32.1 G/DL (ref 31.4–37.4)
MCV RBC AUTO: 95 FL (ref 82–98)
MONOCYTES # BLD AUTO: 0.52 THOUSAND/ΜL (ref 0.17–1.22)
MONOCYTES NFR BLD AUTO: 5 % (ref 4–12)
NEUTROPHILS # BLD AUTO: 8.94 THOUSANDS/ΜL (ref 1.85–7.62)
NEUTS SEG NFR BLD AUTO: 81 % (ref 43–75)
NRBC BLD AUTO-RTO: 0 /100 WBCS
PLATELET # BLD AUTO: 347 THOUSANDS/UL (ref 149–390)
PMV BLD AUTO: 8.6 FL (ref 8.9–12.7)
POTASSIUM SERPL-SCNC: 5 MMOL/L (ref 3.5–5.3)
PROT SERPL-MCNC: 7.2 G/DL (ref 6.4–8.2)
RBC # BLD AUTO: 4.68 MILLION/UL (ref 3.81–5.12)
SODIUM SERPL-SCNC: 139 MMOL/L (ref 136–145)
WBC # BLD AUTO: 10.9 THOUSAND/UL (ref 4.31–10.16)

## 2021-01-18 PROCEDURE — 99024 POSTOP FOLLOW-UP VISIT: CPT | Performed by: SURGERY

## 2021-01-18 PROCEDURE — 80053 COMPREHEN METABOLIC PANEL: CPT

## 2021-01-18 PROCEDURE — 36415 COLL VENOUS BLD VENIPUNCTURE: CPT

## 2021-01-18 PROCEDURE — 85025 COMPLETE CBC W/AUTO DIFF WBC: CPT

## 2021-01-18 NOTE — PROGRESS NOTES
Assessment/Plan:    Blood in stool  Patient notes having 4 episodes of rob blood in stool over past 2 days  Denies abdominal pain  S/p Abd hernia repair x Dannie 6  Small clot on gloved finger on RANCHO  No tenderness on abd exam     Bleed likely from diverticulosis  Orthostatics criteria not met but borderline (18mmhg difference in lying and standing) thus patient advised to hydrate and  instructed to drive patient to lab and then home for rest   Patient will do CBC today and repeat in 48hrs  Patient advised strongly if bleed occurs again to go to ED for surgical evaluation  Diagnoses and all orders for this visit:    Blood in stool  -     Cancel: CBC and differential; Future  -     Cancel: CBC and differential; Future  -     Comprehensive metabolic panel; Future  -     Cancel: CBC and differential; Future  -     Cancel: CBC and differential; Future  -     Comprehensive metabolic panel; Future  -     CBC and differential; Future  -     CBC and differential; Future          Subjective:      Patient ID: Lalit Hirsch is a 65 y o  female  Patient c/o 4 episodes of rob bright and dark red blood mixed into loose stool (1 episode yesterday morning and 3 episodes since this morning)  She reports having dizziness and presyncope after her last episode after showering (likely bending during that time as well)  Patient denies having any abdominal pain, vomiting, constipation/straining to defecate, chest pain, SOB or headaches; she is passing flatus  She has had a similar episode 5 yrs ago with torrential bleeding upon standing - colonoscopy confirmed presence of diverticulosis at that time  Patient is s/p abdominal hernia repair with nil complications, tolerating diet well and no signs of bowel incarceration or skin infection at surgical sites  Patient has been taking ibuprofen and tylenol post her surgical procedure and last took ibuprofen 2 days prior to onset of bleeding         The following portions of the patient's history were reviewed and updated as appropriate: current medications, past family history, past medical history, past surgical history and problem list     Review of Systems   Constitutional: Negative for diaphoresis and fatigue  Respiratory: Negative for chest tightness and shortness of breath  Cardiovascular: Negative for chest pain, palpitations and leg swelling  Gastrointestinal: Positive for blood in stool  Negative for abdominal pain, anal bleeding, constipation, diarrhea and vomiting  Genitourinary: Negative for difficulty urinating and hematuria  Skin: Negative for pallor, rash and wound  Objective:      /72 (BP Location: Left arm, Patient Position: Sitting, Cuff Size: Adult)   Pulse 92   Temp (!) 96 8 °F (36 °C) (Temporal)   Ht 5' 6" (1 676 m)   BMI 25 18 kg/m²          Physical Exam  Vitals signs (Orthostatic BP: lying down 118/60; standing 100/60) reviewed  Constitutional:       General: She is not in acute distress  Appearance: She is not ill-appearing  Cardiovascular:      Rate and Rhythm: Normal rate and regular rhythm  Pulses: Normal pulses  Heart sounds: Normal heart sounds  No murmur  No gallop  Pulmonary:      Effort: Pulmonary effort is normal       Breath sounds: Normal breath sounds  No wheezing, rhonchi or rales  Chest:      Chest wall: No tenderness  Abdominal:      General: Abdomen is flat  Bowel sounds are normal  There is no distension  Palpations: Abdomen is soft  Tenderness: There is no abdominal tenderness  There is no guarding  Hernia: No hernia is present  Comments: Small clot of blood at tip of gloved finger on RANCHO, nil mass palpated  Neurological:      Mental Status: She is alert

## 2021-01-18 NOTE — ASSESSMENT & PLAN NOTE
Patient notes having 4 episodes of rob blood in stool over past 2 days  Denies abdominal pain  S/p Abd hernia repair x Dannie 6  Small clot on gloved finger on RANCHO  No tenderness on abd exam     Bleed likely from diverticulosis  Orthostatics criteria not met but borderline (18mmhg difference in lying and standing) thus patient advised to hydrate and  instructed to drive patient to lab and then home for rest   Patient will do CBC today and repeat in 48hrs  Patient advised strongly if bleed occurs again to go to ED for surgical evaluation

## 2021-01-19 ENCOUNTER — APPOINTMENT (EMERGENCY)
Dept: CT IMAGING | Facility: HOSPITAL | Age: 71
DRG: 357 | End: 2021-01-19
Payer: MEDICARE

## 2021-01-19 ENCOUNTER — TELEPHONE (OUTPATIENT)
Dept: GASTROENTEROLOGY | Facility: AMBULARY SURGERY CENTER | Age: 71
End: 2021-01-19

## 2021-01-19 ENCOUNTER — HOSPITAL ENCOUNTER (INPATIENT)
Facility: HOSPITAL | Age: 71
LOS: 8 days | Discharge: HOME | DRG: 357 | End: 2021-01-27
Attending: EMERGENCY MEDICINE | Admitting: INTERNAL MEDICINE
Payer: MEDICARE

## 2021-01-19 DIAGNOSIS — A04.72 C. DIFFICILE DIARRHEA: ICD-10-CM

## 2021-01-19 DIAGNOSIS — K21.9 GASTROESOPHAGEAL REFLUX DISEASE, UNSPECIFIED WHETHER ESOPHAGITIS PRESENT: ICD-10-CM

## 2021-01-19 DIAGNOSIS — K92.2 ACUTE LOWER GI BLEEDING: ICD-10-CM

## 2021-01-19 DIAGNOSIS — K92.1 BLOOD IN STOOL: Primary | ICD-10-CM

## 2021-01-19 DIAGNOSIS — K66.8 FREE INTRAPERITONEAL AIR: ICD-10-CM

## 2021-01-19 DIAGNOSIS — K92.2 ACUTE GI BLEEDING: ICD-10-CM

## 2021-01-19 PROBLEM — Z98.890 H/O HERNIA REPAIR: Status: ACTIVE | Noted: 2021-01-19

## 2021-01-19 PROBLEM — R19.7 DIARRHEA: Status: ACTIVE | Noted: 2021-01-19

## 2021-01-19 PROBLEM — Z87.19 H/O HERNIA REPAIR: Status: ACTIVE | Noted: 2021-01-19

## 2021-01-19 LAB
ALBUMIN SERPL BCP-MCNC: 3.5 G/DL (ref 3.5–5)
ALP SERPL-CCNC: 83 U/L (ref 46–116)
ALT SERPL W P-5'-P-CCNC: 65 U/L (ref 12–78)
ANION GAP SERPL CALCULATED.3IONS-SCNC: 9 MMOL/L (ref 4–13)
APTT PPP: 25 SECONDS (ref 23–37)
AST SERPL W P-5'-P-CCNC: 31 U/L (ref 5–45)
BASOPHILS # BLD AUTO: 0.06 THOUSANDS/ΜL (ref 0–0.1)
BASOPHILS NFR BLD AUTO: 1 % (ref 0–1)
BILIRUB SERPL-MCNC: 0.33 MG/DL (ref 0.2–1)
BILIRUB UR QL STRIP: NEGATIVE
BUN SERPL-MCNC: 19 MG/DL (ref 5–25)
CALCIUM SERPL-MCNC: 8.9 MG/DL (ref 8.3–10.1)
CHLORIDE SERPL-SCNC: 104 MMOL/L (ref 100–108)
CLARITY UR: CLEAR
CO2 SERPL-SCNC: 27 MMOL/L (ref 21–32)
COLOR UR: YELLOW
CREAT SERPL-MCNC: 0.9 MG/DL (ref 0.6–1.3)
EOSINOPHIL # BLD AUTO: 0.16 THOUSAND/ΜL (ref 0–0.61)
EOSINOPHIL NFR BLD AUTO: 2 % (ref 0–6)
ERYTHROCYTE [DISTWIDTH] IN BLOOD BY AUTOMATED COUNT: 13.9 % (ref 11.6–15.1)
GFR SERPL CREATININE-BSD FRML MDRD: 64 ML/MIN/1.73SQ M
GLUCOSE SERPL-MCNC: 76 MG/DL (ref 65–140)
GLUCOSE UR STRIP-MCNC: NEGATIVE MG/DL
HCT VFR BLD AUTO: 39.1 % (ref 34.8–46.1)
HGB BLD-MCNC: 12.4 G/DL (ref 11.5–15.4)
HGB UR QL STRIP.AUTO: NEGATIVE
IMM GRANULOCYTES # BLD AUTO: 0.06 THOUSAND/UL (ref 0–0.2)
IMM GRANULOCYTES NFR BLD AUTO: 1 % (ref 0–2)
INR PPP: 0.96 (ref 0.84–1.19)
KETONES UR STRIP-MCNC: NEGATIVE MG/DL
LACTATE SERPL-SCNC: 1.7 MMOL/L (ref 0.5–2)
LEUKOCYTE ESTERASE UR QL STRIP: NEGATIVE
LIPASE SERPL-CCNC: 179 U/L (ref 73–393)
LYMPHOCYTES # BLD AUTO: 1.51 THOUSANDS/ΜL (ref 0.6–4.47)
LYMPHOCYTES NFR BLD AUTO: 19 % (ref 14–44)
MCH RBC QN AUTO: 30.2 PG (ref 26.8–34.3)
MCHC RBC AUTO-ENTMCNC: 31.7 G/DL (ref 31.4–37.4)
MCV RBC AUTO: 95 FL (ref 82–98)
MONOCYTES # BLD AUTO: 0.44 THOUSAND/ΜL (ref 0.17–1.22)
MONOCYTES NFR BLD AUTO: 6 % (ref 4–12)
NEUTROPHILS # BLD AUTO: 5.54 THOUSANDS/ΜL (ref 1.85–7.62)
NEUTS SEG NFR BLD AUTO: 71 % (ref 43–75)
NITRITE UR QL STRIP: NEGATIVE
NRBC BLD AUTO-RTO: 0 /100 WBCS
PH UR STRIP.AUTO: 6 [PH] (ref 4.5–8)
PLATELET # BLD AUTO: 365 THOUSANDS/UL (ref 149–390)
PMV BLD AUTO: 8.9 FL (ref 8.9–12.7)
POTASSIUM SERPL-SCNC: 4 MMOL/L (ref 3.5–5.3)
PROT SERPL-MCNC: 6.9 G/DL (ref 6.4–8.2)
PROT UR STRIP-MCNC: NEGATIVE MG/DL
PROTHROMBIN TIME: 12.8 SECONDS (ref 11.6–14.5)
RBC # BLD AUTO: 4.11 MILLION/UL (ref 3.81–5.12)
SODIUM SERPL-SCNC: 140 MMOL/L (ref 136–145)
SP GR UR STRIP.AUTO: 1.01 (ref 1–1.03)
UROBILINOGEN UR QL STRIP.AUTO: 0.2 E.U./DL
WBC # BLD AUTO: 7.77 THOUSAND/UL (ref 4.31–10.16)

## 2021-01-19 PROCEDURE — 81003 URINALYSIS AUTO W/O SCOPE: CPT

## 2021-01-19 PROCEDURE — 74177 CT ABD & PELVIS W/CONTRAST: CPT

## 2021-01-19 PROCEDURE — G1004 CDSM NDSC: HCPCS

## 2021-01-19 PROCEDURE — 85730 THROMBOPLASTIN TIME PARTIAL: CPT | Performed by: EMERGENCY MEDICINE

## 2021-01-19 PROCEDURE — 85610 PROTHROMBIN TIME: CPT | Performed by: EMERGENCY MEDICINE

## 2021-01-19 PROCEDURE — 96361 HYDRATE IV INFUSION ADD-ON: CPT

## 2021-01-19 PROCEDURE — 83690 ASSAY OF LIPASE: CPT | Performed by: EMERGENCY MEDICINE

## 2021-01-19 PROCEDURE — 99223 1ST HOSP IP/OBS HIGH 75: CPT | Performed by: INTERNAL MEDICINE

## 2021-01-19 PROCEDURE — 80053 COMPREHEN METABOLIC PANEL: CPT | Performed by: EMERGENCY MEDICINE

## 2021-01-19 PROCEDURE — 99222 1ST HOSP IP/OBS MODERATE 55: CPT | Performed by: SURGERY

## 2021-01-19 PROCEDURE — 83605 ASSAY OF LACTIC ACID: CPT | Performed by: EMERGENCY MEDICINE

## 2021-01-19 PROCEDURE — 85025 COMPLETE CBC W/AUTO DIFF WBC: CPT | Performed by: EMERGENCY MEDICINE

## 2021-01-19 PROCEDURE — 1124F ACP DISCUSS-NO DSCNMKR DOCD: CPT | Performed by: EMERGENCY MEDICINE

## 2021-01-19 PROCEDURE — 99285 EMERGENCY DEPT VISIT HI MDM: CPT

## 2021-01-19 PROCEDURE — 36415 COLL VENOUS BLD VENIPUNCTURE: CPT | Performed by: EMERGENCY MEDICINE

## 2021-01-19 PROCEDURE — 96360 HYDRATION IV INFUSION INIT: CPT

## 2021-01-19 PROCEDURE — 99222 1ST HOSP IP/OBS MODERATE 55: CPT | Performed by: INTERNAL MEDICINE

## 2021-01-19 PROCEDURE — 99285 EMERGENCY DEPT VISIT HI MDM: CPT | Performed by: EMERGENCY MEDICINE

## 2021-01-19 PROCEDURE — C9113 INJ PANTOPRAZOLE SODIUM, VIA: HCPCS | Performed by: PHYSICIAN ASSISTANT

## 2021-01-19 RX ORDER — SODIUM CHLORIDE 9 MG/ML
50 INJECTION, SOLUTION INTRAVENOUS CONTINUOUS
Status: DISCONTINUED | OUTPATIENT
Start: 2021-01-19 | End: 2021-01-21

## 2021-01-19 RX ORDER — ONDANSETRON 2 MG/ML
4 INJECTION INTRAMUSCULAR; INTRAVENOUS EVERY 6 HOURS PRN
Status: DISCONTINUED | OUTPATIENT
Start: 2021-01-19 | End: 2021-01-27 | Stop reason: HOSPADM

## 2021-01-19 RX ORDER — B-COMPLEX WITH VITAMIN C
1 TABLET ORAL
Status: DISCONTINUED | OUTPATIENT
Start: 2021-01-20 | End: 2021-01-21

## 2021-01-19 RX ORDER — ACETAMINOPHEN 325 MG/1
650 TABLET ORAL EVERY 6 HOURS PRN
Status: DISCONTINUED | OUTPATIENT
Start: 2021-01-19 | End: 2021-01-21

## 2021-01-19 RX ORDER — ATORVASTATIN CALCIUM 10 MG/1
10 TABLET, FILM COATED ORAL
Status: DISCONTINUED | OUTPATIENT
Start: 2021-01-19 | End: 2021-01-21

## 2021-01-19 RX ORDER — PANTOPRAZOLE SODIUM 40 MG/1
40 INJECTION, POWDER, FOR SOLUTION INTRAVENOUS EVERY 12 HOURS SCHEDULED
Status: DISCONTINUED | OUTPATIENT
Start: 2021-01-19 | End: 2021-01-21

## 2021-01-19 RX ADMIN — IOHEXOL 100 ML: 350 INJECTION, SOLUTION INTRAVENOUS at 14:08

## 2021-01-19 RX ADMIN — POLYETHYLENE GLYCOL 3350, SODIUM SULFATE ANHYDROUS, SODIUM BICARBONATE, SODIUM CHLORIDE, POTASSIUM CHLORIDE 4000 ML: 236; 22.74; 6.74; 5.86; 2.97 POWDER, FOR SOLUTION ORAL at 19:09

## 2021-01-19 RX ADMIN — PANTOPRAZOLE SODIUM 40 MG: 40 INJECTION, POWDER, FOR SOLUTION INTRAVENOUS at 20:36

## 2021-01-19 RX ADMIN — SODIUM CHLORIDE 125 ML/HR: 0.9 INJECTION, SOLUTION INTRAVENOUS at 19:09

## 2021-01-19 RX ADMIN — ATORVASTATIN CALCIUM 10 MG: 10 TABLET, FILM COATED ORAL at 19:08

## 2021-01-19 RX ADMIN — SODIUM CHLORIDE 1000 ML: 0.9 INJECTION, SOLUTION INTRAVENOUS at 12:50

## 2021-01-19 RX ADMIN — BISACODYL 10 MG: 5 TABLET, COATED ORAL at 19:08

## 2021-01-19 NOTE — ASSESSMENT & PLAN NOTE
· History of hernia repair with Dr Mary Fabian on 1/4/2021  · She was seen in her office yesterday, and subsequently sent into the emergency department with more episodes of diarrhea  · Feel that her acute GI bleeding is not related to surgery, and merely coincidental   · CT revealing stable postsurgical findings    · Outpatient follow-up as directed with surgery team

## 2021-01-19 NOTE — CONSULTS
Consultation - General Surgery   Yamilka Barnard 65 y o  female MRN: [id number]  Unit/Bed#: ED 18 Encounter: [id number]    Assessment/Plan     Assessment:  77-year-old female with recent robotic inguinal hernia repair here with BRBPR concern for diverticular bleed  Plan:  No acute surgical intervention needed at present  Can be obs under medicine  Continue with conservative management of her diverticular bleed, will reassess if she worsens  Consider GI consult    History of Present Illness      HPI:  Yamilka Barnard is a 65 y o  female who presents with BRBPR  She had a robotic inguinal hernia repair done on 1/6 at [facility]  Her postop course had been pretty benign until Sunday when she had an episode of bright red blood per rectum  She had a similar episode 5 years ago requiring a colonoscopy that showed extensive diverticulosis  She has due for repeat colonoscopy  After this episode she had 1 bleeding episodes as noted above and another 1 this morning  She had an office visit yesterday for postop course during which time a RANCHO was done with clots but no active bleeding but was asked to come in if repeat bleeding happens  No further episodes since this morning  She does not complain of any lightheadedness, dizziness, shortness of breath or chest pain  No abdominal pain at all  She does state that she has been taking ibuprofen in addition to Tylenol for her postop pain but no narcotics  Consults    Review of Systems   Constitutional: Negative  Respiratory: Negative  Cardiovascular: Negative  Gastrointestinal: Negative  All other systems reviewed and are negative        Historical Information   Past Medical History:   Diagnosis Date    Cervical cancer ([facility] [address] )     last assessed [id number]    Diverticulosis     Ear problems     HL (hearing loss)     Hyperlipemia     Tinnitus      Past Surgical History:   Procedure Laterality Date    BREAST BIOPSY Left     COLONOSCOPY      (Fiberoptic)  HYSTERECTOMY      MN LAP,INGUINAL HERNIA REPR,INITIAL Right 1/6/2021    Procedure: ROBOTIC INGUINAL HERNIA REPAIR;  Surgeon: Jeovany Garg MD;  Location: [facility];  Service: General     Social History   Social History     Substance and Sexual Activity   Alcohol Use Yes    Frequency: Monthly or less    Drinks per session: 1 or 2    Comment: holidays - wine     Social History     Substance and Sexual Activity   Drug Use No     E-Cigarette/Vaping    E-Cigarette Use Never User      E-Cigarette/Vaping Substances     Social History     Tobacco Use   Smoking Status Never Smoker   Smokeless Tobacco Never Used     Family History:   Family History   Problem Relation Age of Onset    Breast cancer Mother     Ovarian cancer Maternal Grandmother     Breast cancer Family     Thyroid disease Family     Breast cancer Paternal Aunt     Breast cancer Cousin        Meds/Allergies   all current active meds have been reviewed and current meds:   No current facility-administered medications for this encounter        Allergies   Allergen Reactions    Codeine Hives       Objective   First Vitals:   Blood Pressure: 125/71 (01/19/21 1125)  Pulse: (!) 117 (01/19/21 1125)  Temperature: 98 9 °F (37 2 °C) (01/19/21 1125)  Temp Source: Oral (01/19/21 1125)  Respirations: 18 (01/19/21 1125)  Height: 5' 6" (167 6 cm) (01/19/21 1125)  Weight - Scale: 72 6 kg (160 lb) (01/19/21 1125)  SpO2: 99 % (01/19/21 1125)    Current Vitals:   Blood Pressure: 138/68 (01/19/21 1343)  Pulse: 88 (01/19/21 1343)  Temperature: 98 9 °F (37 2 °C) (01/19/21 1125)  Temp Source: Oral (01/19/21 1125)  Respirations: 18 (01/19/21 1343)  Height: 5' 6" (167 6 cm) (01/19/21 1125)  Weight - Scale: 72 6 kg (160 lb) (01/19/21 1125)  SpO2: 99 % (01/19/21 1125)    No intake or output data in the 24 hours ending 01/19/21 1551    Invasive Devices     Peripheral Intravenous Line            Peripheral IV 01/19/21 Left Antecubital less than 1 day          Drain NG/OG/Enteral Tube Orogastric 18 Fr Right mouth 13 days                Physical Exam  Constitutional:       General: She is not in acute distress  Appearance: Normal appearance  HENT:      Head: Normocephalic and atraumatic  Right Ear: External ear normal       Left Ear: External ear normal       Nose: Nose normal       Mouth/Throat:      Mouth: Mucous membranes are moist       Pharynx: Oropharynx is clear  Eyes:      Extraocular Movements: Extraocular movements intact  Conjunctiva/sclera: Conjunctivae normal       Pupils: Pupils are equal, round, and reactive to light  Neck:      Musculoskeletal: Normal range of motion  Cardiovascular:      Rate and Rhythm: Normal rate and regular rhythm  Pulses: Normal pulses  Pulmonary:      Effort: Pulmonary effort is normal       Breath sounds: Normal breath sounds  Abdominal:      General: There is no distension  Palpations: Abdomen is soft  Tenderness: There is no abdominal tenderness  There is no guarding or rebound  Comments: Incisions c/d/i  No external hemorrhoids/ active bleeding  RANCHO deferred per pt request    Skin:     General: Skin is warm and dry  Capillary Refill: Capillary refill takes less than 2 seconds  Neurological:      General: No focal deficit present  Mental Status: She is alert and oriented to person, place, and time  Psychiatric:         Mood and Affect: Mood normal          Behavior: Behavior normal          Lab Results: I have personally reviewed pertinent lab results  Imaging: I have personally reviewed pertinent reports  EKG, Pathology, and Other Studies: I have personally reviewed pertinent reports

## 2021-01-19 NOTE — ED PROVIDER NOTES
History  Chief Complaint   Patient presents with    Rectal Bleeding     c/o rectal bleeding (dark, tarry diarrhea), dizziness, nausea since Sunday     65year-old female presents the emergency department for evaluation of bloody diarrhea  Patient states that yesterday she woke up in the morning had mild nausea  Patient states that she then had loose bloody bowel movements  She states that there is bright red blood with small clots noted as well as particles of stool  She denies black or tarry stool  Patient states that she did have an episode of lightheadedness yesterday after having the bowel movement  No loss of consciousness  Patient recalls having a similar episode approximately 5 years ago  She was evaluated by Colorectal surgery and was told that she had a diverticular bleed  Colonoscopy following that episode was unremarkable  Patient is 2 weeks status post laparoscopic right inguinal hernia repair with Dr Sunshine Winters  Patient was evaluated by Dr Kimmy Colon yesterday and had a rectal exam in the office that was positive for bright red blood and a small clot  Patient had outpatient blood work yesterday that demonstrated a hemoglobin of 14  Patient did have a 2nd episode of bloody diarrhea this morning and was instructed to report to the emergency department  Patient denies associated abdominal pain  She states that she had taking ibuprofen round the clock for 30 doses, she stop taking the ibuprofen several days ago and has been taking Tylenol  She denies having epigastric pain or history of peptic ulcer disease  History provided by:  Patient, medical records and spouse   used: No    Rectal Bleeding - Minor  Quality:  Bright red  Amount:   Moderate  Duration:  2 days  Timing:  Intermittent  Chronicity:  Recurrent  Context: defecation and diarrhea    Context: not rectal pain    Similar prior episodes: yes    Relieved by:  Nothing  Worsened by:  Nothing  Ineffective treatments:  None tried  Associated symptoms: light-headedness    Associated symptoms: no abdominal pain, no fever, no hematemesis and no loss of consciousness    Risk factors: NSAID use    Risk factors: no hx of colorectal cancer and no hx of colorectal surgery        Prior to Admission Medications   Prescriptions Last Dose Informant Patient Reported? Taking?    Calcium Carb-Cholecalciferol (CALCIUM 1000 + D) 1000-800 MG-UNIT TABS  Self Yes Yes   Sig: Take by mouth   HYDROcodone-acetaminophen (NORCO) 5-325 mg per tablet  Self No No   Sig: Take 1 tablet by mouth every 6 (six) hours as needed for pain for up to 5 dosesMax Daily Amount: 4 tablets   Patient not taking: Reported on 1/18/2021   Na Sulfate-K Sulfate-Mg Sulf 17 5-3 13-1 6 GM/177ML SOLN   No No   Sig: Take 1 applicator by mouth once for 1 dose   Probiotic Product (PROBIOTIC-10) CAPS  Self Yes Yes   Sig: Take by mouth   atorvastatin (LIPITOR) 10 mg tablet  Self No Yes   Sig: Take 1 tablet (10 mg total) by mouth daily   cholecalciferol (VITAMIN D3) 1,000 units tablet  Self Yes Yes   Sig: Take 3 tablets by mouth daily   ibuprofen (MOTRIN) 800 mg tablet  Self No No   Sig: Take 1 tablet (800 mg total) by mouth every 6 (six) hours as needed for mild pain for up to 10 days   pantoprazole (PROTONIX) 40 mg tablet  Self No Yes   Sig: TAKE 1 TABLET BY MOUTH DAILY BEFORE BREAKFAST      Facility-Administered Medications: None       Past Medical History:   Diagnosis Date    Cervical cancer ([facility] [address] )     last assessed 09Apr2014    Diverticulosis     Ear problems     HL (hearing loss)     Hyperlipemia     Tinnitus        Past Surgical History:   Procedure Laterality Date    BREAST BIOPSY Left     COLONOSCOPY      (Fiberoptic)    HYSTERECTOMY      VT LAP,INGUINAL HERNIA REPR,INITIAL Right 1/6/2021    Procedure: ROBOTIC INGUINAL HERNIA REPAIR;  Surgeon: Evelio Metcalf MD;  Location: [facility];  Service: General       Family History   Problem Relation Age of Onset    Breast cancer Mother     Ovarian cancer Maternal Grandmother     Breast cancer Family     Thyroid disease Family     Breast cancer Paternal Aunt     Breast cancer Cousin      I have reviewed and agree with the history as documented  E-Cigarette/Vaping    E-Cigarette Use Never User      E-Cigarette/Vaping Substances     Social History     Tobacco Use    Smoking status: Never Smoker    Smokeless tobacco: Never Used   Substance Use Topics    Alcohol use: Yes     Frequency: Monthly or less     Drinks per session: 1 or 2     Comment: holidays - wine    Drug use: No       Review of Systems   Constitutional: Negative for appetite change and fever  Gastrointestinal: Positive for blood in stool, diarrhea, hematochezia and nausea  Negative for abdominal pain, hematemesis and rectal pain  Genitourinary: Negative for dysuria and hematuria  Musculoskeletal: Negative for back pain  Neurological: Positive for light-headedness  Negative for loss of consciousness  All other systems reviewed and are negative  Physical Exam  Physical Exam  Constitutional:       General: She is not in acute distress  Appearance: She is well-developed  She is not ill-appearing, toxic-appearing or diaphoretic  HENT:      Head: Normocephalic  Nose: Nose normal       Mouth/Throat:      Pharynx: No oropharyngeal exudate  Eyes:      Conjunctiva/sclera: Conjunctivae normal       Pupils: Pupils are equal, round, and reactive to light  Neck:      Musculoskeletal: Normal range of motion and neck supple  Cardiovascular:      Rate and Rhythm: Regular rhythm  Tachycardia present  Heart sounds: Normal heart sounds  Pulmonary:      Effort: Pulmonary effort is normal       Breath sounds: Normal breath sounds  Abdominal:      General: Bowel sounds are normal  There is no distension  Palpations: Abdomen is soft  Tenderness: There is no abdominal tenderness  There is no guarding or rebound  Musculoskeletal: Normal range of motion  General: No tenderness or deformity  Lymphadenopathy:      Cervical: No cervical adenopathy  Skin:     General: Skin is warm and dry  Capillary Refill: Capillary refill takes less than 2 seconds  Coloration: Skin is not pale  Findings: No rash  Neurological:      General: No focal deficit present  Mental Status: She is alert and oriented to person, place, and time  Cranial Nerves: No cranial nerve deficit  Sensory: No sensory deficit  Motor: No abnormal muscle tone  Coordination: Coordination normal       Gait: Gait normal       Deep Tendon Reflexes: Reflexes are normal and symmetric  Psychiatric:         Behavior: Behavior normal          Thought Content:  Thought content normal          Judgment: Judgment normal          Vital Signs  ED Triage Vitals [01/19/21 1125]   Temperature Pulse Respirations Blood Pressure SpO2   98 9 °F (37 2 °C) (!) 117 18 125/71 99 %      Temp Source Heart Rate Source Patient Position - Orthostatic VS BP Location FiO2 (%)   Oral Monitor Sitting Left arm --      Pain Score       --           Vitals:    01/19/21 1336 01/19/21 1338 01/19/21 1340 01/19/21 1343   BP: 151/70 147/68 154/70 138/68   Pulse: 72 87 87 88   Patient Position - Orthostatic VS: Lying - Orthostatic VS Sitting - Orthostatic VS Standing - Orthostatic VS          Visual Acuity      ED Medications  Medications   bisacodyl (DULCOLAX) EC tablet 10 mg (has no administration in time range)   polyethylene glycol (GOLYTELY) bowel prep 4,000 mL (has no administration in time range)   sodium chloride 0 9 % bolus 1,000 mL (0 mL Intravenous Stopped 1/19/21 1639)   iohexol (OMNIPAQUE) 350 MG/ML injection (MULTI-DOSE) 100 mL (100 mL Intravenous Given 1/19/21 1408)       Diagnostic Studies  Results Reviewed     Procedure Component Value Units Date/Time    Stool Enteric Bacterial Panel by PCR [[id number]]     Lab Status: No result Specimen: Stool     Clostridium difficile toxin by PCR with EIA [[id number]]     Lab Status: No result Specimen: Stool     Calprotectin,Fecal [[id number]]     Lab Status: No result Specimen: Stool     Urine Macroscopic, POC [[id number]] Collected: 01/19/21 1345    Lab Status: Final result Specimen: Urine Updated: 01/19/21 1346     Color, UA Yellow     Clarity, UA Clear     pH, UA 6 0     Leukocytes, UA Negative     Nitrite, UA Negative     Protein, UA Negative mg/dl      Glucose, UA Negative mg/dl      Ketones, UA Negative mg/dl      Urobilinogen, UA 0 2 E U /dl      Bilirubin, UA Negative     Blood, UA Negative     Specific Gravity, UA 1 010    Narrative:      CLINITEK RESULT    Comprehensive metabolic panel [[id number]] Collected: 01/19/21 1253    Lab Status: Final result Specimen: Blood from Arm, Left Updated: 01/19/21 1331     Sodium 140 mmol/L      Potassium 4 0 mmol/L      Chloride 104 mmol/L      CO2 27 mmol/L      ANION GAP 9 mmol/L      BUN 19 mg/dL      Creatinine 0 90 mg/dL      Glucose 76 mg/dL      Calcium 8 9 mg/dL      AST 31 U/L      ALT 65 U/L      Alkaline Phosphatase 83 U/L      Total Protein 6 9 g/dL      Albumin 3 5 g/dL      Total Bilirubin 0 33 mg/dL      eGFR 64 ml/min/1 73sq m     Narrative:      [place] guidelines for Chronic Kidney Disease (CKD):     Stage 1 with normal or high GFR (GFR > 90 mL/min/1 73 square meters)    Stage 2 Mild CKD (GFR = 60-89 mL/min/1 73 square meters)    Stage 3A Moderate CKD (GFR = 45-59 mL/min/1 73 square meters)    Stage 3B Moderate CKD (GFR = 30-44 mL/min/1 73 square meters)    Stage 4 Severe CKD (GFR = 15-29 mL/min/1 73 square meters)    Stage 5 End Stage CKD (GFR <15 mL/min/1 73 square meters)  Note: GFR calculation is accurate only with a steady state creatinine    Lipase [[id number]]  (Normal) Collected: 01/19/21 1253    Lab Status: Final result Specimen: Blood from Arm, Left Updated: 01/19/21 1331     Lipase 179 u/L     Lactic acid [[id number]]  (Normal) Collected: 01/19/21 1253    Lab Status: Final result Specimen: Blood from Arm, Left Updated: 01/19/21 1325     LACTIC ACID 1 7 mmol/L     Narrative:      Result may be elevated if tourniquet was used during collection  Protime-INR [[id number]]  (Normal) Collected: 01/19/21 1253    Lab Status: Final result Specimen: Blood from Arm, Left Updated: 01/19/21 1314     Protime 12 8 seconds      INR 0 96    APTT [[id number]]  (Normal) Collected: 01/19/21 1253    Lab Status: Final result Specimen: Blood from Arm, Left Updated: 01/19/21 1314     PTT 25 seconds     CBC and differential [[id number]] Collected: 01/19/21 1253    Lab Status: Final result Specimen: Blood from Arm, Left Updated: 01/19/21 1309     WBC 7 77 Thousand/uL      RBC 4 11 Million/uL      Hemoglobin 12 4 g/dL      Hematocrit 39 1 %      MCV 95 fL      MCH 30 2 pg      MCHC 31 7 g/dL      RDW 13 9 %      MPV 8 9 fL      Platelets 875 Thousands/uL      nRBC 0 /100 WBCs      Neutrophils Relative 71 %      Immat GRANS % 1 %      Lymphocytes Relative 19 %      Monocytes Relative 6 %      Eosinophils Relative 2 %      Basophils Relative 1 %      Neutrophils Absolute 5 54 Thousands/µL      Immature Grans Absolute 0 06 Thousand/uL      Lymphocytes Absolute 1 51 Thousands/µL      Monocytes Absolute 0 44 Thousand/µL      Eosinophils Absolute 0 16 Thousand/µL      Basophils Absolute 0 06 Thousands/µL                  CT abdomen pelvis with contrast   Final Result by Marielle Gonzalez DO (01/19 1722)      No acute inflammatory process identified  Diverticulosis without evidence of acute diverticulitis  Small volume pneumomediastinum and small focus of pneumoperitoneum  These are likely secondary to recent laparoscopic surgery  No free fluid in the abdomen to suggest visceral perforation  Mild edema in the right lower quadrant intra-abdominal fat compatible with sequela of recent laparoscopic surgery           The study was marked in EPIC for immediate notification  Workstation performed: [id number]                    Procedures  Procedures         ED Course                             SBIRT 20yo+      Most Recent Value   SBIRT (23 yo +)   In order to provide better care to our patients, we are screening all of our patients for alcohol and drug use  Would it be okay to ask you these screening questions? Yes Filed at: 01/19/2021 1311   Initial Alcohol Screen: US AUDIT-C    1  How often do you have a drink containing alcohol?  0 Filed at: 01/19/2021 1311   2  How many drinks containing alcohol do you have on a typical day you are drinking? 0 Filed at: 01/19/2021 1311   3b  FEMALE Any Age, or MALE 65+: How often do you have 4 or more drinks on one occassion? 0 Filed at: 01/19/2021 1311   Audit-C Score  0 Filed at: 01/19/2021 1311   LALITO: How many times in the past year have you    Used an illegal drug or used a prescription medication for non-medical reasons? Never Filed at: 01/19/2021 1311                    MDM  Number of Diagnoses or Management Options  Acute lower GI bleeding: new and requires workup     Amount and/or Complexity of Data Reviewed  Clinical lab tests: reviewed and ordered  Tests in the radiology section of CPT®: ordered and reviewed  Decide to obtain previous medical records or to obtain history from someone other than the patient: yes  Discuss the patient with other providers: yes  Independent visualization of images, tracings, or specimens: yes    Risk of Complications, Morbidity, and/or Mortality  General comments: Patient evaluated by General surgery team   No acute surgical intervention or complication related to recent surgery    Recommend admission for GI workup with the medical team    Patient Progress  Patient progress: stable      Disposition  Final diagnoses:   Acute lower GI bleeding     Time reflects when diagnosis was documented in both MDM as applicable and the Disposition within this note     Time User Action Codes Description Comment    1/19/2021  3:24 PM Shania Iryb [K92 1] Blood in stool     1/19/2021  3:24 PM Uvaldo Marie [K92 1] Blood in stool     1/19/2021  3:35 PM WhiteNita Add [K92 2] Lower GI bleed     1/19/2021  3:35 PM White, Nita Add [K92 2] Acute lower GI bleeding     1/19/2021  3:35 PM Nita Green Remove [K92 2] Lower GI bleed     1/19/2021  3:42 PM Bryce Galeano Remove [K92 2] Acute lower GI bleeding     1/19/2021  4:11 PM Nadege Garsia Add [K92 2] Acute GI bleeding     1/19/2021  4:41 PM Nita Green Add [K92 2] Acute lower GI bleeding       ED Disposition     ED Disposition Condition Date/Time Comment    Admit Stable Tue Jan 19, 2021  3:35 PM Case was discussed with Dr Sujey Lewis and the patient's admission status was agreed to be Admission Status: observation status to the service of Dr Sujey Lewis   Follow-up Information    None         Patient's Medications   Discharge Prescriptions    No medications on file     No discharge procedures on file      PDMP Review     None          ED Provider  Electronically Signed by           Octavio Leyva DO  01/19/21 6319

## 2021-01-19 NOTE — H&P
[address] - Internal Medicine Service  H&P- Evins Pro 1950, 65 y o  female MRN: [id number]  Unit/Bed#: ED 18 Encounter: [id number]  Primary Care Provider: Gael Sorto DO   Date and time admitted to hospital: 1/19/2021 12:21 PM    * Acute GI bleeding  Assessment & Plan   Hemoglobin initially 14 0, today 12 4  She is hemodynamically stable   o Monitor H&H q 12    CT scan today with no acute findings to account for GI bleeding  No colitis or abscess noted  She has stable postsurgical findings   Keep NPO  IV fluids   Start [facility] PPI b i d  [facility]  o Prep for colonoscopy and EGD tomorrow  H/O hernia repair  Assessment & Plan  · History of hernia repair with Dr Kristin Solomon on 1/4/2021  · She was seen in her office yesterday, and subsequently sent into the emergency department with more episodes of diarrhea  · Feel that her acute GI bleeding is not related to surgery, and merely coincidental   · CT revealing stable postsurgical findings  · Outpatient follow-up as directed with surgery team    Diarrhea  Assessment & Plan  · Over the past 3 days she has reported multiple episodes of diarrhea, with bleeding  No sick contacts or recent travel, food contamination  · Obtaining stool studies, rule out C diff and stool enteric panel  · Monitoring off of antibiotics, IV hydration and supportive care  Hyperlipidemia  Assessment & Plan  · Continue statin  VTE Prophylaxis: Pharmacologic VTE Prophylaxis contraindicated due to GI bleeding  / sequential compression device   Code Status: full code  POLST: POLST form is not discussed and not completed at this time  Discussion with family:  Patient and  at bedside    Anticipated Length of Stay:  Patient will be admitted on an Inpatient basis with an anticipated length of stay of > 2 midnights     Justification for Hospital Stay: GI bleeding    Total Time for Visit, including Counseling / Coordination of Care: 1 hour  Greater than 50% of this total time spent on direct patient counseling and coordination of care  Chief Complaint:   Diarrhea, GI bleeding    History of Present Illness:    Al Strange is a 65 y o  female who presents with 3 days worth of diarrhea, blood in her stool  Patient had a laparoscopic hernia repair 01/04/2021, and had been doing well afterwards  This was an elective procedure  The patient reports that approximately 3 days ago she noticed having diarrhea in the morning, with blood in it  The blood was bright red, and reportedly large volume  She has had a few episodes every morning for the past 3 days  No reports of any fevers, chills, abdominal pain  She has had some nausea but no vomiting  She did feel lightheaded earlier today  The patient's last colonoscopy was approximately 5 years ago, and did report diverticular bleed but no diverticulitis  No history of cancer  She is reportedly scheduled for colonoscopy in April as a routine follow-up  She was in the surgeon's office yesterday for routine checkup, and did have positive guaiac test with clots noted  The patient was sent for outpatient hemoglobin which revealed hemoglobin of 14 0, today it is 12 4  Patient will be admitted as an inpatient for gastroenterology evaluation and colonoscopy/endoscopy  She is feeling better after IV hydration in the emergency department with no lightheadedness  Review of Systems:    Review of Systems   Constitutional: Negative for activity change, appetite change, chills, fatigue and fever  HENT: Negative for congestion, rhinorrhea, sinus pressure and sore throat  Eyes: Negative for photophobia, pain and visual disturbance  Respiratory: Negative for cough, shortness of breath and wheezing  Cardiovascular: Negative for chest pain, palpitations and leg swelling  Gastrointestinal: Positive for blood in stool, diarrhea and nausea   Negative for abdominal distention, abdominal pain, constipation and vomiting  Endocrine: Negative for cold intolerance, heat intolerance, polydipsia and polyuria  Genitourinary: Negative for difficulty urinating, dysuria, flank pain, frequency and hematuria  Musculoskeletal: Negative for arthralgias, back pain and joint swelling  Skin: Negative for color change, pallor and rash  Allergic/Immunologic: Negative  Neurological: Positive for light-headedness  Negative for dizziness, syncope, weakness and headaches  Hematological: Negative  Psychiatric/Behavioral: Negative  Past Medical and Surgical History:     Past Medical History:   Diagnosis Date    Cervical cancer ([facility] [place] [address] )     last assessed 09Apr2014    Diverticulosis     Ear problems     HL (hearing loss)     Hyperlipemia     Tinnitus        Past Surgical History:   Procedure Laterality Date    BREAST BIOPSY Left     COLONOSCOPY      (Fiberoptic)    HYSTERECTOMY      [place] LAP,INGUINAL HERNIA REPR,INITIAL Right 1/6/2021    Procedure: ROBOTIC INGUINAL HERNIA REPAIR;  Surgeon: Lexi Yoo MD;  Location: [facility] OR;  Service: General       Meds/Allergies:    Prior to Admission medications    Medication Sig Start Date End Date Taking?  Authorizing Provider   atorvastatin (LIPITOR) 10 mg tablet Take 1 tablet (10 mg total) by mouth daily 10/5/20   Jazlyn Jurist,    Calcium Carb-Cholecalciferol (CALCIUM 1000 + D) 1000-800 MG-UNIT TABS Take by mouth    Historical Provider, MD   cholecalciferol (VITAMIN D3) 1,000 units tablet Take 3 tablets by mouth daily 2/25/13   Historical Provider, MD   HYDROcodone-acetaminophen (NORCO) 5-325 mg per tablet Take 1 tablet by mouth every 6 (six) hours as needed for pain for up to 5 dosesMax Daily Amount: 4 tablets  Patient not taking: Reported on 1/18/2021 1/6/21   Lexi Yoo MD   ibuprofen (MOTRIN) 800 mg tablet Take 1 tablet (800 mg total) by mouth every 6 (six) hours as needed for mild pain for up to 10 days 1/6/21 1/18/21  Lexi Yoo MD Na Sulfate-K Sulfate-Mg Sulf 17 5-3 13-1 6 [id number]/[id number] SOLN Take 1 applicator by mouth once for 1 dose 12/7/20 12/7/20  Toño Ibarra MD   pantoprazole (PROTONIX) 40 mg tablet TAKE 1 TABLET BY MOUTH DAILY BEFORE BREAKFAST 1/5/21   Ryann Rhoades,    Probiotic Product (PROBIOTIC-10) CAPS Take by mouth    Historical Provider, MD     I have reviewed home medications with patient personally  Allergies: Allergies   Allergen Reactions    Codeine Hives       Social History:     Marital Status: /Civil Union   Occupation: non-contributory  Patient Pre-hospital Living Situation: home  Patient Pre-hospital Level of Mobility: full  Patient Pre-hospital Diet Restrictions: none  Substance Use History:   Social History     Substance and Sexual Activity   Alcohol Use Yes    Frequency: Monthly or less    Drinks per session: 1 or 2    Comment: holidays - wine     Social History     Tobacco Use   Smoking Status Never Smoker   Smokeless Tobacco Never Used     Social History     Substance and Sexual Activity   Drug Use No       Family History:    Family History   Problem Relation Age of Onset    Breast cancer Mother     Ovarian cancer Maternal Grandmother     Breast cancer Family     Thyroid disease Family     Breast cancer Paternal Aunt     Breast cancer Cousin        Physical Exam:     Vitals:   Blood Pressure: 138/68 (01/19/21 1343)  Pulse: 88 (01/19/21 1343)  Temperature: 98 9 °F (37 2 °C) (01/19/21 1125)  Temp Source: Oral (01/19/21 1125)  Respirations: 18 (01/19/21 1343)  Height: 5' 6" (167 6 cm) (01/19/21 1125)  Weight - Scale: 72 6 kg (160 lb) (01/19/21 1125)  SpO2: 99 % (01/19/21 1125)    Physical Exam  Vitals signs and nursing note reviewed  Constitutional:       General: She is not in acute distress  Appearance: Normal appearance  HENT:      Head: Normocephalic and atraumatic  Mouth/Throat:      Mouth: Mucous membranes are moist    Eyes:      General: No scleral icterus       Extraocular Movements: Extraocular movements intact  Pupils: Pupils are equal, round, and reactive to light  Neck:      Musculoskeletal: Neck supple  Cardiovascular:      Rate and Rhythm: Normal rate and regular rhythm  Heart sounds: Normal heart sounds  No murmur  No friction rub  Pulmonary:      Effort: Pulmonary effort is normal       Breath sounds: Normal breath sounds  No wheezing or rhonchi  Abdominal:      General: Bowel sounds are normal  There is no distension  Palpations: Abdomen is soft  Tenderness: There is no abdominal tenderness  There is no guarding  Musculoskeletal:         General: No swelling  Right lower leg: No edema  Left lower leg: No edema  Skin:     General: Skin is warm and dry  Capillary Refill: Capillary refill takes less than 2 seconds  Coloration: Skin is not jaundiced or pale  Neurological:      General: No focal deficit present  Mental Status: She is alert and oriented to person, place, and time  Mental status is at baseline  Additional Data:     Lab Results: I have personally reviewed pertinent reports  Results from last 7 days   Lab Units 01/19/21  1253   WBC Thousand/uL 7 77   HEMOGLOBIN g/dL 12 4   HEMATOCRIT % 39 1   PLATELETS Thousands/uL 365   NEUTROS PCT % 71   LYMPHS PCT % 19   MONOS PCT % 6   EOS PCT % 2     Results from last 7 days   Lab Units 01/19/21  1253   SODIUM mmol/L 140   POTASSIUM mmol/L 4 0   CHLORIDE mmol/L 104   CO2 mmol/L 27   BUN mg/dL 19   CREATININE mg/dL 0 90   ANION GAP mmol/L 9   CALCIUM mg/dL 8 9   ALBUMIN g/dL 3 5   TOTAL BILIRUBIN mg/dL 0 33   ALK PHOS U/L 83   ALT U/L 65   AST U/L 31   GLUCOSE RANDOM mg/dL 76     Results from last 7 days   Lab Units 01/19/21  1253   INR  0 96             Results from last 7 days   Lab Units 01/19/21  1253   LACTIC ACID mmol/L 1 7       Imaging: I have personally reviewed pertinent reports        CT abdomen pelvis with contrast   Final Result by Robbie Sarmiento DO (01/19 1432)      No acute inflammatory process identified  Diverticulosis without evidence of acute diverticulitis  Small volume pneumomediastinum and small focus of pneumoperitoneum  These are likely secondary to recent laparoscopic surgery  No free fluid in the abdomen to suggest visceral perforation  Mild edema in the right lower quadrant intra-abdominal fat compatible with sequela of recent laparoscopic surgery  The study was marked in [facility] for immediate notification  Workstation performed: [id number]             EKG, Pathology, and Other Studies Reviewed on Admission:   · Prior pertinent studies and records reviewed in [facility]  Allscripts / Epic Records Reviewed: Yes     ** Please Note: This note has been constructed using a voice recognition system   **

## 2021-01-19 NOTE — ASSESSMENT & PLAN NOTE
· Over the past 3 days she has reported multiple episodes of diarrhea, with bleeding  No sick contacts or recent travel, food contamination  · Obtaining stool studies:  Rule out C diff, stool enteric panel, fecal calprotectin  · Monitoring off of antibiotics, IV hydration and supportive care

## 2021-01-19 NOTE — CONSULTS
Consultation - [facility]  Nelson Najera 65 y o  female MRN: [id number]  Unit/Bed#: ED 18 Encounter: [id number]        Consults    Reason for Consult / Principal Problem: rectal bleeding    ASSESSMENT and PLAN:    Principal Problem:    Acute GI bleeding  Active Problems:    Hyperlipidemia    Diarrhea    H/O hernia repair    #1  Rectal bleeding: since Sunday AM, had two episodes every morning since then  hgb 14 yesterday, 12 today  BUn normal  Was using NSAIDs since surgery on 1/6  Likely lower GI bleeding but also rule out PUD  -plan for EGD/colonoscopy tomorrow  -clear liquids tonight, NPO after midnight  -PPI BID IV  -dulc/golytely prep  -monitor hgb, transfuse as necessary  -follow up infectious stool work up  #2  GERD with intermittent dysphagia  -EGD as above   -PPI as above  -------------------------------------------------------------------------------------------------------------------    HPI:  This is a 75-year-old female with a history of GERD, osteopenia, hyperlipidemia, inguinal hernia status post recent repair on 01/06, recent NSAID use who presented to the hospital secondary to rectal bleeding  Patient reports that she began to have rectal bleeding on Sunday morning  She had 2 episodes of red blood and clots mixed with diarrhea  She reports no further episodes the rest of the day  She had this happen again on Monday morning and was seen by her surgeon who did her recent inguinal hernia repair  She had blood on her rectal exam   She was sent for a CBC which showed a normal hemoglobin  She then woke up again this morning and had the same occur with bright red blood and was recommended to come to the emergency room  Denies any melena  Denies any significant abdominal pain but did have a little bit of discomfort yesterday  Had a little bit of nauseous feeling yesterday but denies any vomiting    Was using NSAIDs after her surgery alternating with Tylenol up until last Thursday  Denies any blood thinners  Denies any constipation or straining prior to the onset of symptoms  She does also have a history of GERD and occasional dysphagia for which she takes a PPI  She was seen recently by Dr Cleo Snyder in the office and was actually scheduled for an EGD and colonoscopy as an outpatient for colon cancer screening and GERD  Her last colonoscopy was approximately in 2014 at which time she had diverticulosis  Denies any recent antibiotic use or sick contacts  Denies any fevers    REVIEW OF SYSTEMS:    CONSTITUTIONAL: Denies any fever, chills, or rigors  Good appetite, and no recent weight loss  HEENT: No earache or tinnitus  Denies hearing loss or visual disturbances  CARDIOVASCULAR: No chest pain or palpitations  RESPIRATORY: Denies any cough, hemoptysis, shortness of breath or dyspnea on exertion  GASTROINTESTINAL: As noted in the History of Present Illness  GENITOURINARY: No problems with urination  Denies any hematuria or dysuria  NEUROLOGIC: No dizziness or vertigo, denies headaches  MUSCULOSKELETAL: Denies any muscle or joint pain  SKIN: Denies skin rashes or itching  ENDOCRINE: Denies excessive thirst  Denies intolerance to heat or cold  PSYCHOSOCIAL: Denies depression or anxiety  Denies any recent memory loss         Historical Information   Past Medical History:   Diagnosis Date    Cervical cancer ([facility] [address] )     last assessed 09Apr2014    Diverticulosis     Ear problems     HL (hearing loss)     Hyperlipemia     Tinnitus      Past Surgical History:   Procedure Laterality Date    BREAST BIOPSY Left     COLONOSCOPY      (Fiberoptic)    HYSTERECTOMY      [place] LAP,INGUINAL HERNIA REPR,INITIAL Right 1/6/2021    Procedure: ROBOTIC INGUINAL HERNIA REPAIR;  Surgeon: Marleni Cordova MD;  [facility]: [facility];  Service: General     Social History   Social History     Substance and Sexual Activity   Alcohol Use Yes    Frequency: Monthly or less    Drinks per session: 1 or 2    Comment: holidays - wine     Social History     Substance and Sexual Activity   Drug Use No     Social History     Tobacco Use   Smoking Status Never Smoker   Smokeless Tobacco Never Used     Family History   Problem Relation Age of Onset    Breast cancer Mother     Ovarian cancer Maternal Grandmother     Breast cancer Family     Thyroid disease Family     Breast cancer Paternal Aunt     Breast cancer Cousin        Meds/Allergies     (Not in a hospital admission)    No current facility-administered medications for this encounter  Allergies   Allergen Reactions    Codeine Hives           Objective     Blood pressure 138/68, pulse 88, temperature 98 9 °F (37 2 °C), temperature source Oral, resp  rate 18, height 5' 6" (1 676 m), weight 72 6 kg (160 lb), SpO2 99 %  No intake or output data in the 24 hours ending 01/19/21 1608      PHYSICAL EXAM:      General Appearance:   Alert, cooperative, no distress, appears stated age    HEENT:   Normocephalic, atraumatic, anicteric, no oropharyngeal thrush present      Neck:  Supple, symmetrical, trachea midline, no adenopathy;    thyroid: no enlargement/tenderness/nodules; no carotid  bruit or JVD    Lungs:   Clear to auscultation bilaterally; no rales, rhonchi or wheezing; respirations unlabored    Heart[de-identified]   S1 and S2 normal; regular rate and rhythm; no murmur, rub, or gallop     Abdomen:   Soft, non-tender, non-distended; normal bowel sounds; no masses, no organomegaly    Genitalia:   Deferred    Rectal:   Deferred    Extremities:  No cyanosis, clubbing or edema    Pulses:  2+ and symmetric all extremities    Skin:  Skin color, texture, turgor normal, no rashes or lesions    Lymph nodes:  No palpable cervical, axillary or inguinal lymphadenopathy        Lab Results:   Results from last 7 days   Lab Units 01/19/21  1253   WBC Thousand/uL 7 77   HEMOGLOBIN g/dL 12 4   HEMATOCRIT % 39 1   PLATELETS Thousands/uL 365   NEUTROS PCT % 71   LYMPHS PCT % 19 MONOS PCT % 6   EOS PCT % 2     Results from last 7 days   Lab Units 01/19/21  1253   POTASSIUM mmol/L 4 0   CHLORIDE mmol/L 104   CO2 mmol/L 27   BUN mg/dL 19   CREATININE mg/dL 0 90   CALCIUM mg/dL 8 9   ALK PHOS U/L 83   ALT U/L 65   AST U/L 31     Results from last 7 days   Lab Units 01/19/21  1253   INR  0 96     Results from last 7 days   Lab Units 01/19/21  1253   LIPASE u/L 179       Imaging Studies: I have personally reviewed pertinent imaging studies  Ct Abdomen Pelvis With Contrast    Result Date: 1/19/2021  Impression: No acute inflammatory process identified  Diverticulosis without evidence of acute diverticulitis  Small volume pneumomediastinum and small focus of pneumoperitoneum  These are likely secondary to recent laparoscopic surgery  No free fluid in the abdomen to suggest visceral perforation  Mild edema in the right lower quadrant intra-abdominal fat compatible with sequela of recent laparoscopic surgery  The study was marked in [facility] for immediate notification  Workstation performed: [id number]           Patient was seen and examined by Dr Adrianne Elizabeth  All flores medical decisions were made by Dr Adrianne Elizabeth  Thank you for allowing us to participate in the care of this present patient  We will follow-up with you closely

## 2021-01-19 NOTE — ASSESSMENT & PLAN NOTE
 Hemoglobin initially 14 0, today 12 4  She is hemodynamically stable   o Monitor H&H q 12    CT scan today with no acute findings to account for GI bleeding  No colitis or abscess noted  She has stable postsurgical findings   Keep NPO  IV fluids   Start IV PPI b i d  [facility]  o Prep for colonoscopy and EGD tomorrow

## 2021-01-20 ENCOUNTER — ANESTHESIA EVENT (INPATIENT)
Dept: GASTROENTEROLOGY | Facility: HOSPITAL | Age: 71
DRG: 357 | End: 2021-01-20
Payer: MEDICARE

## 2021-01-20 ENCOUNTER — APPOINTMENT (INPATIENT)
Dept: GASTROENTEROLOGY | Facility: HOSPITAL | Age: 71
DRG: 357 | End: 2021-01-20
Payer: MEDICARE

## 2021-01-20 ENCOUNTER — ANESTHESIA (INPATIENT)
Dept: GASTROENTEROLOGY | Facility: HOSPITAL | Age: 71
DRG: 357 | End: 2021-01-20
Payer: MEDICARE

## 2021-01-20 VITALS — HEART RATE: 97 BPM

## 2021-01-20 PROBLEM — A04.72 C. DIFFICILE DIARRHEA: Status: ACTIVE | Noted: 2021-01-20

## 2021-01-20 LAB
ANION GAP SERPL CALCULATED.3IONS-SCNC: 7 MMOL/L (ref 4–13)
BASOPHILS # BLD AUTO: 0.07 THOUSANDS/ΜL (ref 0–0.1)
BASOPHILS NFR BLD AUTO: 1 % (ref 0–1)
BUN SERPL-MCNC: 13 MG/DL (ref 5–25)
C DIFF TOX A+B STL QL IA: NEGATIVE
C DIFF TOX B TCDB STL QL NAA+PROBE: POSITIVE
CALCIUM SERPL-MCNC: 8.2 MG/DL (ref 8.3–10.1)
CAMPYLOBACTER DNA SPEC NAA+PROBE: NORMAL
CHLORIDE SERPL-SCNC: 109 MMOL/L (ref 100–108)
CO2 SERPL-SCNC: 27 MMOL/L (ref 21–32)
CREAT SERPL-MCNC: 0.82 MG/DL (ref 0.6–1.3)
EOSINOPHIL # BLD AUTO: 0.2 THOUSAND/ΜL (ref 0–0.61)
EOSINOPHIL NFR BLD AUTO: 2 % (ref 0–6)
ERYTHROCYTE [DISTWIDTH] IN BLOOD BY AUTOMATED COUNT: 13.9 % (ref 11.6–15.1)
GFR SERPL CREATININE-BSD FRML MDRD: 72 ML/MIN/1.73SQ M
GLUCOSE SERPL-MCNC: 89 MG/DL (ref 65–140)
HCT VFR BLD AUTO: 32 % (ref 34.8–46.1)
HCT VFR BLD AUTO: 34.1 % (ref 34.8–46.1)
HGB BLD-MCNC: 10.2 G/DL (ref 11.5–15.4)
HGB BLD-MCNC: 10.7 G/DL (ref 11.5–15.4)
IMM GRANULOCYTES # BLD AUTO: 0.06 THOUSAND/UL (ref 0–0.2)
IMM GRANULOCYTES NFR BLD AUTO: 1 % (ref 0–2)
LYMPHOCYTES # BLD AUTO: 1.94 THOUSANDS/ΜL (ref 0.6–4.47)
LYMPHOCYTES NFR BLD AUTO: 22 % (ref 14–44)
MCH RBC QN AUTO: 29.9 PG (ref 26.8–34.3)
MCHC RBC AUTO-ENTMCNC: 31.4 G/DL (ref 31.4–37.4)
MCV RBC AUTO: 95 FL (ref 82–98)
MONOCYTES # BLD AUTO: 0.53 THOUSAND/ΜL (ref 0.17–1.22)
MONOCYTES NFR BLD AUTO: 6 % (ref 4–12)
NEUTROPHILS # BLD AUTO: 6.05 THOUSANDS/ΜL (ref 1.85–7.62)
NEUTS SEG NFR BLD AUTO: 68 % (ref 43–75)
NRBC BLD AUTO-RTO: 0 /100 WBCS
PLATELET # BLD AUTO: 320 THOUSANDS/UL (ref 149–390)
PMV BLD AUTO: 9.1 FL (ref 8.9–12.7)
POTASSIUM SERPL-SCNC: 3.7 MMOL/L (ref 3.5–5.3)
RBC # BLD AUTO: 3.58 MILLION/UL (ref 3.81–5.12)
SALMONELLA DNA SPEC QL NAA+PROBE: NORMAL
SHIGA TOXIN STX GENE SPEC NAA+PROBE: NORMAL
SHIGELLA DNA SPEC QL NAA+PROBE: NORMAL
SODIUM SERPL-SCNC: 143 MMOL/L (ref 136–145)
WBC # BLD AUTO: 8.85 THOUSAND/UL (ref 4.31–10.16)

## 2021-01-20 PROCEDURE — 83993 ASSAY FOR CALPROTECTIN FECAL: CPT | Performed by: PHYSICIAN ASSISTANT

## 2021-01-20 PROCEDURE — 85025 COMPLETE CBC W/AUTO DIFF WBC: CPT | Performed by: PHYSICIAN ASSISTANT

## 2021-01-20 PROCEDURE — 87493 C DIFF AMPLIFIED PROBE: CPT | Performed by: PHYSICIAN ASSISTANT

## 2021-01-20 PROCEDURE — 0DB38ZX EXCISION OF LOWER ESOPHAGUS, VIA NATURAL OR ARTIFICIAL OPENING ENDOSCOPIC, DIAGNOSTIC: ICD-10-PCS | Performed by: INTERNAL MEDICINE

## 2021-01-20 PROCEDURE — 43239 EGD BIOPSY SINGLE/MULTIPLE: CPT | Performed by: INTERNAL MEDICINE

## 2021-01-20 PROCEDURE — C9113 INJ PANTOPRAZOLE SODIUM, VIA: HCPCS | Performed by: PHYSICIAN ASSISTANT

## 2021-01-20 PROCEDURE — 0DB68ZX EXCISION OF STOMACH, VIA NATURAL OR ARTIFICIAL OPENING ENDOSCOPIC, DIAGNOSTIC: ICD-10-PCS | Performed by: INTERNAL MEDICINE

## 2021-01-20 PROCEDURE — 88305 TISSUE EXAM BY PATHOLOGIST: CPT | Performed by: PATHOLOGY

## 2021-01-20 PROCEDURE — 45378 DIAGNOSTIC COLONOSCOPY: CPT | Performed by: INTERNAL MEDICINE

## 2021-01-20 PROCEDURE — 99232 SBSQ HOSP IP/OBS MODERATE 35: CPT | Performed by: PHYSICIAN ASSISTANT

## 2021-01-20 PROCEDURE — 87505 NFCT AGENT DETECTION GI: CPT | Performed by: PHYSICIAN ASSISTANT

## 2021-01-20 PROCEDURE — 85018 HEMOGLOBIN: CPT | Performed by: PHYSICIAN ASSISTANT

## 2021-01-20 PROCEDURE — 80048 BASIC METABOLIC PNL TOTAL CA: CPT | Performed by: PHYSICIAN ASSISTANT

## 2021-01-20 PROCEDURE — 85014 HEMATOCRIT: CPT | Performed by: PHYSICIAN ASSISTANT

## 2021-01-20 RX ORDER — PROPOFOL 10 MG/ML
INJECTION, EMULSION INTRAVENOUS AS NEEDED
Status: DISCONTINUED | OUTPATIENT
Start: 2021-01-20 | End: 2021-01-20

## 2021-01-20 RX ORDER — SODIUM CHLORIDE 9 MG/ML
INJECTION, SOLUTION INTRAVENOUS CONTINUOUS PRN
Status: DISCONTINUED | OUTPATIENT
Start: 2021-01-20 | End: 2021-01-20

## 2021-01-20 RX ORDER — KETAMINE HYDROCHLORIDE 50 MG/ML
INJECTION, SOLUTION, CONCENTRATE INTRAMUSCULAR; INTRAVENOUS AS NEEDED
Status: DISCONTINUED | OUTPATIENT
Start: 2021-01-20 | End: 2021-01-20

## 2021-01-20 RX ORDER — LIDOCAINE HYDROCHLORIDE 10 MG/ML
INJECTION, SOLUTION EPIDURAL; INFILTRATION; INTRACAUDAL; PERINEURAL AS NEEDED
Status: DISCONTINUED | OUTPATIENT
Start: 2021-01-20 | End: 2021-01-20

## 2021-01-20 RX ORDER — PROPOFOL 10 MG/ML
INJECTION, EMULSION INTRAVENOUS CONTINUOUS PRN
Status: DISCONTINUED | OUTPATIENT
Start: 2021-01-20 | End: 2021-01-20

## 2021-01-20 RX ADMIN — PROPOFOL 40 MG: 10 INJECTION, EMULSION INTRAVENOUS at 14:27

## 2021-01-20 RX ADMIN — SODIUM CHLORIDE: 0.9 INJECTION, SOLUTION INTRAVENOUS at 14:21

## 2021-01-20 RX ADMIN — KETAMINE HYDROCHLORIDE 10 MG: 50 INJECTION INTRAMUSCULAR; INTRAVENOUS at 14:30

## 2021-01-20 RX ADMIN — KETAMINE HYDROCHLORIDE 20 MG: 50 INJECTION INTRAMUSCULAR; INTRAVENOUS at 14:27

## 2021-01-20 RX ADMIN — SODIUM CHLORIDE 125 ML/HR: 0.9 INJECTION, SOLUTION INTRAVENOUS at 04:04

## 2021-01-20 RX ADMIN — Medication 125 MG: at 18:49

## 2021-01-20 RX ADMIN — KETAMINE HYDROCHLORIDE 20 MG: 50 INJECTION INTRAMUSCULAR; INTRAVENOUS at 14:36

## 2021-01-20 RX ADMIN — LIDOCAINE HYDROCHLORIDE 50 MG: 10 INJECTION, SOLUTION EPIDURAL; INFILTRATION; INTRACAUDAL at 14:24

## 2021-01-20 RX ADMIN — PROPOFOL 40 MCG/KG/MIN: 10 INJECTION, EMULSION INTRAVENOUS at 14:25

## 2021-01-20 RX ADMIN — TOPICAL ANESTHETIC 1 SPRAY: 200 SPRAY DENTAL; PERIODONTAL at 14:21

## 2021-01-20 RX ADMIN — Medication 125 MG: at 23:56

## 2021-01-20 RX ADMIN — PANTOPRAZOLE SODIUM 40 MG: 40 INJECTION, POWDER, FOR SOLUTION INTRAVENOUS at 08:49

## 2021-01-20 RX ADMIN — PANTOPRAZOLE SODIUM 40 MG: 40 INJECTION, POWDER, FOR SOLUTION INTRAVENOUS at 20:25

## 2021-01-20 RX ADMIN — ATORVASTATIN CALCIUM 10 MG: 10 TABLET, FILM COATED ORAL at 16:25

## 2021-01-20 RX ADMIN — ACETAMINOPHEN 650 MG: 325 TABLET, FILM COATED ORAL at 20:35

## 2021-01-20 NOTE — ASSESSMENT & PLAN NOTE
· History of hernia repair with Dr Nena Ceballos on 1/4/2021  · She was seen in her office day prior to admission and subsequently sent into the emergency department with more episodes of diarrhea  · Feel that her acute GI bleeding is not related to surgery, and merely coincidental   · CT revealing stable postsurgical findings    · Outpatient follow-up as directed with surgery team

## 2021-01-20 NOTE — PROGRESS NOTES
[facility]  Progress Note - Gaanaid Marimar 1950, 65 y o  female MRN: [id number]  Unit/Bed#: YO SEGAL 401-01 Encounter: [id number]  Primary Care Provider: Stanley Valdez DO   Date and time admitted to hospital: 1/19/2021 12:21 PM    * Acute GI bleeding  Assessment & Plan   CT scan today with no acute findings to account for GI bleeding  No colitis or abscess noted  She has stable postsurgical findings   S/P EGD and colonoscopy today - likely self limited diverticular bleed  Old blood noted and poor prep   EGD with moderate gastritis and candida esophagitis  o Continue PPI   GI and general surgery following   Plan to advance diet to clears tonight and if tolerates further advancement in diet tomorrow, discharge in AM   Repeat H&H in AM and transfuse as needed   Of note post EGD patient was noted to have abdominal discomfort  This is improving  If worsens overnight, would proceed with CT of the abdomen  Lab Results   Component Value Date    HGB 10 2 (L) 01/20/2021    HGB 10 7 (L) 01/20/2021    HGB 12 4 01/19/2021    HGB 14 3 01/18/2021         C  difficile diarrhea  Assessment & Plan  · Noted to have PCR positive but EIA  · Given no prior history and current findings, plan to start vancomycin as per discussion with GI  · Plan for treatment x 14 days    H/O hernia repair  Assessment & Plan  · History of hernia repair with Dr Keon Tirado on 1/4/2021  · She was seen in her office day prior to admission and subsequently sent into the emergency department with more episodes of diarrhea  · Feel that her acute GI bleeding is not related to surgery, and merely coincidental   · CT revealing stable postsurgical findings  · Outpatient follow-up as directed with surgery team    Hyperlipidemia  Assessment & Plan  · Continue statin  VTE Pharmacologic Prophylaxis: VTE Score: 3 Moderate Risk (Score 3-4) - Pharmacological DVT Prophylaxis Contraindicated   Sequential Compression Devices Ordered  Patient Centered Rounds: I have performed bedside rounds with nursing staff today  Discussions with Specialists or Other Care Team Provider: nursing, GI    Education and Discussions with Family / Patient: GI updated  via phone    Time Spent for Care: 30 minutes  More than 50% of total time spent on counseling and coordination of care as described above  Current Length of Stay: 1 day(s)  Current Patient Status: Inpatient   Certification Statement: The patient will continue to require additional inpatient hospital stay due to monitor H&H, stool output  Discharge Plan: Anticipate discharge tomorrow to home  Code Status: Level 1 - Full Code    Subjective:   Feels okay  Upset about c diff being positive  Abdominal pain better after passing gas  Objective:     Vitals:   Temp (24hrs), Av 8 °F (36 6 °C), Min:97 6 °F (36 4 °C), Max:98 1 °F (36 7 °C)    Temp:  [97 6 °F (36 4 °C)-98 1 °F (36 7 °C)] 98 °F (36 7 °C)  HR:  [] 83  Resp:  [13-20] 16  BP: ()/(50-92) 158/92  SpO2:  [94 %-99 %] 94 %  Body mass index is 26 33 kg/m²  Input and Output Summary (last 24 hours): Intake/Output Summary (Last 24 hours) at 2021 1627  Last data filed at 2021 1449  Gross per 24 hour   Intake 2404 16 ml   Output --   Net 2404 16 ml       Physical Exam:   Physical Exam  Vitals signs and nursing note reviewed  Constitutional:       General: She is not in acute distress  Appearance: Normal appearance  She is ill-appearing  She is not diaphoretic  HENT:      Head: Normocephalic and atraumatic  Mouth/Throat:      Mouth: Mucous membranes are moist    Cardiovascular:      Rate and Rhythm: Normal rate and regular rhythm  Pulmonary:      Effort: Pulmonary effort is normal       Breath sounds: Normal breath sounds  No stridor  No wheezing, rhonchi or rales  Abdominal:      General: Bowel sounds are normal       Palpations: Abdomen is soft  There is no mass        Tenderness: There is abdominal tenderness (diffusely)  There is no guarding  Musculoskeletal:      Right lower leg: No edema  Left lower leg: No edema  Skin:     General: Skin is warm and dry  Neurological:      Mental Status: She is alert     Psychiatric:         Mood and Affect: Mood normal          Behavior: Behavior normal         Additional Data:     Labs:  Results from last 7 days   Lab Units 01/20/21  1110 01/20/21  0447   WBC Thousand/uL  --  8 85   HEMOGLOBIN g/dL 10 2* 10 7*   HEMATOCRIT % 32 0* 34 1*   PLATELETS Thousands/uL  --  320   NEUTROS PCT %  --  68   LYMPHS PCT %  --  22   MONOS PCT %  --  6   EOS PCT %  --  2     Results from last 7 days   Lab Units 01/20/21  0447 01/19/21  1253   SODIUM mmol/L 143 140   POTASSIUM mmol/L 3 7 4 0   CHLORIDE mmol/L 109* 104   CO2 mmol/L 27 27   BUN mg/dL 13 19   CREATININE mg/dL 0 82 0 90   ANION GAP mmol/L 7 9   CALCIUM mg/dL 8 2* 8 9   ALBUMIN g/dL  --  3 5   TOTAL BILIRUBIN mg/dL  --  0 33   ALK PHOS U/L  --  83   ALT U/L  --  65   AST U/L  --  31   GLUCOSE RANDOM mg/dL 89 76     Results from last 7 days   Lab Units 01/19/21  1253   INR  0 96             Results from last 7 days   Lab Units 01/19/21  1253   LACTIC ACID mmol/L 1 7       Lines/Drains:  Invasive Devices     Peripheral Intravenous Line            Peripheral IV 01/19/21 Left Antecubital 1 day                Telemetry:        Imaging: Reviewed radiology reports from this admission including: abdominal/pelvic CT scan    Recent Cultures (last 7 days):   Results from last 7 days   Lab Units 01/20/21  0001   C DIFF TOXIN B BY PCR  Positive*       Last 24 Hours Medication List:   Current Facility-Administered Medications   Medication Dose Route Frequency Provider Last Rate    acetaminophen  650 mg Oral Q6H PRN Alfredo Morrison PA-C      atorvastatin  10 mg Oral Daily With [employer] Mu[employer]      calcium carbonate-vitamin D  1 tablet Oral Daily With Breakfast Alfredo Morrison PA-C      ondansetron  4 mg Intravenous Q6H PRN Heather Michel PA-C      pantoprazole  40 mg Intravenous Q12H [facility] Heather Michel PA-C      sodium chloride  50 mL/hr Intravenous Continuous Ninfa Negrete PA-C Stopped (01/20/21 1159)    vancomycin  125 mg Oral Q6H [facility] Ellis Carrasco PA-C          Today, Patient Was Seen By: Rubi Robins PA-C    ** Please Note: Dictation voice to text software may have been used in the creation of this document   **

## 2021-01-20 NOTE — ANESTHESIA PREPROCEDURE EVALUATION
Procedure:  EGD  COLONOSCOPY    Relevant Problems   CARDIO   (+) Hyperlipidemia      GI/HEPATIC   (+) Acute GI bleeding   (+) Esophageal reflux             Anesthesia Plan  ASA Score- 2     Anesthesia Type- IV sedation with anesthesia with ASA Monitors  Additional Monitors:   Airway Plan:           Plan Factors-Exercise tolerance (METS): >4 METS  Chart reviewed  Existing labs reviewed  Patient summary reviewed  Patient is not a current smoker  Induction- intravenous  Postoperative Plan- Plan for postoperative opioid use  Informed Consent- Anesthetic plan and risks discussed with patient  I personally reviewed this patient with the CRNA  Discussed and agreed on the Anesthesia Plan with the CRNA  Cassandra Thakur

## 2021-01-20 NOTE — ASSESSMENT & PLAN NOTE
 CT scan today with no acute findings to account for GI bleeding  No colitis or abscess noted  She has stable postsurgical findings   S/P EGD and colonoscopy today - likely self limited diverticular bleed  Old blood noted and poor prep   EGD with moderate gastritis and candida esophagitis  o Continue PPI   GI and general surgery following   Plan to advance diet to clears tonight and if tolerates further advancement in diet tomorrow, discharge in AM   Repeat H&H in AM and transfuse as needed   Of note post EGD patient was noted to have abdominal discomfort  This is improving  If worsens overnight, would proceed with CT of the abdomen      Lab Results   Component Value Date    HGB 10 2 (L) 01/20/2021    HGB 10 7 (L) 01/20/2021    HGB 12 4 01/19/2021    HGB 14 3 01/18/2021

## 2021-01-20 NOTE — PLAN OF CARE
Problem: PAIN - ADULT  Goal: Verbalizes/displays adequate comfort level or baseline comfort level  Description: Interventions:  - Encourage patient to monitor pain and request assistance  - Assess pain using appropriate pain scale  - Administer analgesics based on type and severity of pain and evaluate response  - Implement non-pharmacological measures as appropriate and evaluate response  - Consider cultural and social influences on pain and pain management  - Notify physician/advanced practitioner if interventions unsuccessful or patient reports new pain  Outcome: Progressing     Problem: DISCHARGE PLANNING  Goal: Discharge to home or other facility with appropriate resources  Description: INTERVENTIONS:  - Identify barriers to discharge w/patient and caregiver  - Arrange for needed discharge resources and transportation as appropriate  - Identify discharge learning needs (meds, wound care, etc )  - Arrange for interpretive services to assist at discharge as needed  - Refer to Case Management Department for coordinating discharge planning if the patient needs post-hospital services based on physician/advanced practitioner order or complex needs related to functional status, cognitive ability, or social support system  Outcome: Progressing     Problem: Knowledge Deficit  Goal: Patient/family/caregiver demonstrates understanding of disease process, treatment plan, medications, and discharge instructions  Description: Complete learning assessment and assess knowledge base    Interventions:  - Provide teaching at level of understanding  - Provide teaching via preferred learning methods  Outcome: Progressing     Problem: GASTROINTESTINAL - ADULT  Goal: Maintains or returns to baseline bowel function  Description: INTERVENTIONS:  - Assess bowel function  - Encourage oral fluids to ensure adequate hydration  - Administer IV fluids if ordered to ensure adequate hydration  - Administer ordered medications as needed  - Encourage mobilization and activity  - Consider nutritional services referral to assist patient with adequate nutrition and appropriate food choices  Outcome: Progressing  Goal: Maintains adequate nutritional intake  Description: INTERVENTIONS:  - Monitor percentage of each meal consumed  - Identify factors contributing to decreased intake, treat as appropriate  - Assist with meals as needed  - Monitor I&O, weight, and lab values if indicated  - Obtain nutrition services referral as needed  Outcome: Progressing

## 2021-01-20 NOTE — ASSESSMENT & PLAN NOTE
· Noted to have PCR positive but EIA  · Given no prior history and current findings, plan to start vancomycin as per discussion with GI  · Plan for treatment x 14 days

## 2021-01-20 NOTE — ANESTHESIA POSTPROCEDURE EVALUATION
Post-Op Assessment Note    CV Status:  Stable  Pain Score: 0    Pain management: adequate     Mental Status:  Alert and awake   Hydration Status:  Euvolemic   PONV Controlled:  Controlled   Airway Patency:  Patent      Post Op Vitals Reviewed: Yes      Staff: CRNA         No complications documented      BP   98/50   Temp      Pulse  95   Resp   16   SpO2   93

## 2021-01-20 NOTE — PROGRESS NOTES
Progress Note - General Surgery   Cesario Avila 65 y o  female MRN: [id number]  Unit/Bed#: W -01 Encounter: [id number]    Assessment:  64 yo F presenting with GI bleed, likely diverticular  Plan:  GI planning for upper and lower endoscopy today  Will follow up on results  PPx: PPI  HGB was WNL yesterday, awaiting today's result, chemical DVT ppx is being held  No plans for surgical intervention at this time      Subjective/Objective     Subjective: Patient endorsed multiple bloody bowel movements overnight  No abdominal pain, nausea, or emesis  No lightheadedness  Objective:  Blood pressure 141/76, pulse 80, temperature 97 7 °F (36 5 °C), temperature source Oral, resp  rate 18, height 5' 6" (1 676 m), weight 74 kg (163 lb 2 3 oz), SpO2 98 %  ,Body mass index is 26 33 kg/m²        Intake/Output Summary (Last 24 hours) at 1/20/2021 5573  Last data filed at 1/20/2021 0404  Gross per 24 hour   Intake 1114 58 ml   Output --   Net 1114 58 ml       Invasive Devices     Peripheral Intravenous Line            Peripheral IV 01/19/21 Left Antecubital less than 1 day          Drain            NG/OG/Enteral Tube Orogastric 18 Fr Right mouth 13 days                Physical Exam:  Gen: NAD, Comfortable  Neuro: A&O, No focal deficits  Head: Normal Cephalic, Atraumatic  Eye: EOMI, PERRLA, No scleral icterus  Neck: Supple, No JVD, Midline trachea  CV: RRR, Cap refill <2 sec  Pulm: Normal work of breathing, no respiratory distress  Abd: Soft, Non-Distended, Non-Tender  Ext: No edema, Non-tender  Skin: warm, dry, intact      Lab, Imaging and other studies:  Recent Labs     01/18/21  1322 01/19/21  1253 01/20/21  0447   WBC 10 90* 7 77 8 85   HGB 14 3 12 4 10 7*    365 320     Recent Labs     01/18/21  1322 01/19/21  1253 01/20/21  0447   SODIUM 139 140 143   K 5 0 4 0 3 7    104 109*   CO2 29 27 27   BUN 24 19 13   CREATININE 1 02 0 90 0 82   CALCIUM 9 6 8 9 8 2*     COAG - PT/INR/PTT: 12 8/0 96/25 (01/19 9173) VTE Pharmacologic Prophylaxis: Sequential compression device (Venodyne)   VTE Mechanical Prophylaxis: sequential compression device

## 2021-01-21 ENCOUNTER — ANESTHESIA (INPATIENT)
Dept: PERIOP | Facility: HOSPITAL | Age: 71
DRG: 357 | End: 2021-01-21
Payer: MEDICARE

## 2021-01-21 ENCOUNTER — APPOINTMENT (INPATIENT)
Dept: RADIOLOGY | Facility: HOSPITAL | Age: 71
DRG: 357 | End: 2021-01-21
Payer: MEDICARE

## 2021-01-21 ENCOUNTER — ANESTHESIA EVENT (INPATIENT)
Dept: PERIOP | Facility: HOSPITAL | Age: 71
DRG: 357 | End: 2021-01-21
Payer: MEDICARE

## 2021-01-21 VITALS — HEART RATE: 85 BPM

## 2021-01-21 PROBLEM — K66.8 PNEUMOPERITONEUM: Status: ACTIVE | Noted: 2021-01-21

## 2021-01-21 PROBLEM — K63.1 PERFORATED BOWEL (HCC): Status: ACTIVE | Noted: 2021-01-21

## 2021-01-21 LAB
ANION GAP SERPL CALCULATED.3IONS-SCNC: 9 MMOL/L (ref 4–13)
BUN SERPL-MCNC: 7 MG/DL (ref 5–25)
CALCIUM SERPL-MCNC: 8.4 MG/DL (ref 8.3–10.1)
CHLORIDE SERPL-SCNC: 108 MMOL/L (ref 100–108)
CO2 SERPL-SCNC: 27 MMOL/L (ref 21–32)
CREAT SERPL-MCNC: 0.89 MG/DL (ref 0.6–1.3)
ERYTHROCYTE [DISTWIDTH] IN BLOOD BY AUTOMATED COUNT: 13.9 % (ref 11.6–15.1)
GFR SERPL CREATININE-BSD FRML MDRD: 65 ML/MIN/1.73SQ M
GLUCOSE SERPL-MCNC: 123 MG/DL (ref 65–140)
HCT VFR BLD AUTO: 33.2 % (ref 34.8–46.1)
HGB BLD-MCNC: 10.6 G/DL (ref 11.5–15.4)
MCH RBC QN AUTO: 29.9 PG (ref 26.8–34.3)
MCHC RBC AUTO-ENTMCNC: 31.9 G/DL (ref 31.4–37.4)
MCV RBC AUTO: 94 FL (ref 82–98)
PLATELET # BLD AUTO: 329 THOUSANDS/UL (ref 149–390)
PMV BLD AUTO: 9 FL (ref 8.9–12.7)
POTASSIUM SERPL-SCNC: 3.5 MMOL/L (ref 3.5–5.3)
RBC # BLD AUTO: 3.55 MILLION/UL (ref 3.81–5.12)
SODIUM SERPL-SCNC: 144 MMOL/L (ref 136–145)
WBC # BLD AUTO: 10.48 THOUSAND/UL (ref 4.31–10.16)

## 2021-01-21 PROCEDURE — 80048 BASIC METABOLIC PNL TOTAL CA: CPT | Performed by: PHYSICIAN ASSISTANT

## 2021-01-21 PROCEDURE — 85027 COMPLETE CBC AUTOMATED: CPT | Performed by: PHYSICIAN ASSISTANT

## 2021-01-21 PROCEDURE — 74018 RADEX ABDOMEN 1 VIEW: CPT

## 2021-01-21 PROCEDURE — 0WJG0ZZ INSPECTION OF PERITONEAL CAVITY, OPEN APPROACH: ICD-10-PCS | Performed by: SURGERY

## 2021-01-21 PROCEDURE — 99233 SBSQ HOSP IP/OBS HIGH 50: CPT | Performed by: SURGERY

## 2021-01-21 PROCEDURE — C9290 INJ, BUPIVACAINE LIPOSOME: HCPCS | Performed by: STUDENT IN AN ORGANIZED HEALTH CARE EDUCATION/TRAINING PROGRAM

## 2021-01-21 PROCEDURE — 49000 EXPLORATION OF ABDOMEN: CPT | Performed by: SURGERY

## 2021-01-21 PROCEDURE — 99232 SBSQ HOSP IP/OBS MODERATE 35: CPT | Performed by: PHYSICIAN ASSISTANT

## 2021-01-21 PROCEDURE — 99232 SBSQ HOSP IP/OBS MODERATE 35: CPT | Performed by: INTERNAL MEDICINE

## 2021-01-21 RX ORDER — PANTOPRAZOLE SODIUM 40 MG/1
40 TABLET, DELAYED RELEASE ORAL
Status: DISCONTINUED | OUTPATIENT
Start: 2021-01-21 | End: 2021-01-27 | Stop reason: HOSPADM

## 2021-01-21 RX ORDER — LIDOCAINE HYDROCHLORIDE 10 MG/ML
INJECTION, SOLUTION EPIDURAL; INFILTRATION; INTRACAUDAL; PERINEURAL AS NEEDED
Status: DISCONTINUED | OUTPATIENT
Start: 2021-01-21 | End: 2021-01-21

## 2021-01-21 RX ORDER — NEOSTIGMINE METHYLSULFATE 1 MG/ML
INJECTION INTRAVENOUS AS NEEDED
Status: DISCONTINUED | OUTPATIENT
Start: 2021-01-21 | End: 2021-01-21

## 2021-01-21 RX ORDER — MAGNESIUM HYDROXIDE 1200 MG/15ML
LIQUID ORAL AS NEEDED
Status: DISCONTINUED | OUTPATIENT
Start: 2021-01-21 | End: 2021-01-21 | Stop reason: HOSPADM

## 2021-01-21 RX ORDER — HYDROMORPHONE HCL/PF 1 MG/ML
0.5 SYRINGE (ML) INJECTION
Status: DISCONTINUED | OUTPATIENT
Start: 2021-01-21 | End: 2021-01-21 | Stop reason: HOSPADM

## 2021-01-21 RX ORDER — SODIUM CHLORIDE 9 MG/ML
125 INJECTION, SOLUTION INTRAVENOUS CONTINUOUS
Status: DISCONTINUED | OUTPATIENT
Start: 2021-01-21 | End: 2021-01-21

## 2021-01-21 RX ORDER — SODIUM CHLORIDE 9 MG/ML
INJECTION, SOLUTION INTRAVENOUS CONTINUOUS PRN
Status: DISCONTINUED | OUTPATIENT
Start: 2021-01-21 | End: 2021-01-21

## 2021-01-21 RX ORDER — FENTANYL CITRATE/PF 50 MCG/ML
50 SYRINGE (ML) INJECTION
Status: DISCONTINUED | OUTPATIENT
Start: 2021-01-21 | End: 2021-01-21 | Stop reason: HOSPADM

## 2021-01-21 RX ORDER — CEFAZOLIN SODIUM 1 G/50ML
1000 SOLUTION INTRAVENOUS ONCE
Status: COMPLETED | OUTPATIENT
Start: 2021-01-21 | End: 2021-01-21

## 2021-01-21 RX ORDER — FENTANYL CITRATE 50 UG/ML
INJECTION, SOLUTION INTRAMUSCULAR; INTRAVENOUS AS NEEDED
Status: DISCONTINUED | OUTPATIENT
Start: 2021-01-21 | End: 2021-01-21

## 2021-01-21 RX ORDER — PROPOFOL 10 MG/ML
INJECTION, EMULSION INTRAVENOUS AS NEEDED
Status: DISCONTINUED | OUTPATIENT
Start: 2021-01-21 | End: 2021-01-21

## 2021-01-21 RX ORDER — DIPHENHYDRAMINE HYDROCHLORIDE 50 MG/ML
12.5 INJECTION INTRAMUSCULAR; INTRAVENOUS ONCE AS NEEDED
Status: DISCONTINUED | OUTPATIENT
Start: 2021-01-21 | End: 2021-01-21 | Stop reason: HOSPADM

## 2021-01-21 RX ORDER — CEFAZOLIN SODIUM 2 G/50ML
2000 SOLUTION INTRAVENOUS EVERY 8 HOURS
Status: DISCONTINUED | OUTPATIENT
Start: 2021-01-21 | End: 2021-01-25

## 2021-01-21 RX ORDER — ONDANSETRON 2 MG/ML
4 INJECTION INTRAMUSCULAR; INTRAVENOUS EVERY 6 HOURS PRN
Status: DISCONTINUED | OUTPATIENT
Start: 2021-01-21 | End: 2021-01-23

## 2021-01-21 RX ORDER — SUCCINYLCHOLINE/SOD CL,ISO/PF 100 MG/5ML
SYRINGE (ML) INTRAVENOUS AS NEEDED
Status: DISCONTINUED | OUTPATIENT
Start: 2021-01-21 | End: 2021-01-21

## 2021-01-21 RX ORDER — DEXAMETHASONE SODIUM PHOSPHATE 10 MG/ML
INJECTION, SOLUTION INTRAMUSCULAR; INTRAVENOUS AS NEEDED
Status: DISCONTINUED | OUTPATIENT
Start: 2021-01-21 | End: 2021-01-21

## 2021-01-21 RX ORDER — CEFAZOLIN SODIUM 1 G/3ML
INJECTION, POWDER, FOR SOLUTION INTRAMUSCULAR; INTRAVENOUS AS NEEDED
Status: DISCONTINUED | OUTPATIENT
Start: 2021-01-21 | End: 2021-01-21

## 2021-01-21 RX ORDER — SODIUM CHLORIDE, SODIUM LACTATE, POTASSIUM CHLORIDE, CALCIUM CHLORIDE 600; 310; 30; 20 MG/100ML; MG/100ML; MG/100ML; MG/100ML
100 INJECTION, SOLUTION INTRAVENOUS CONTINUOUS
Status: DISCONTINUED | OUTPATIENT
Start: 2021-01-21 | End: 2021-01-22

## 2021-01-21 RX ORDER — ONDANSETRON 2 MG/ML
4 INJECTION INTRAMUSCULAR; INTRAVENOUS ONCE AS NEEDED
Status: DISCONTINUED | OUTPATIENT
Start: 2021-01-21 | End: 2021-01-21 | Stop reason: HOSPADM

## 2021-01-21 RX ORDER — ACETAMINOPHEN 325 MG/1
650 TABLET ORAL EVERY 8 HOURS SCHEDULED
Status: DISCONTINUED | OUTPATIENT
Start: 2021-01-21 | End: 2021-01-23

## 2021-01-21 RX ORDER — SODIUM CHLORIDE, SODIUM LACTATE, POTASSIUM CHLORIDE, CALCIUM CHLORIDE 600; 310; 30; 20 MG/100ML; MG/100ML; MG/100ML; MG/100ML
75 INJECTION, SOLUTION INTRAVENOUS CONTINUOUS
Status: DISCONTINUED | OUTPATIENT
Start: 2021-01-21 | End: 2021-01-22

## 2021-01-21 RX ORDER — BUPIVACAINE HYDROCHLORIDE 2.5 MG/ML
INJECTION, SOLUTION EPIDURAL; INFILTRATION; INTRACAUDAL AS NEEDED
Status: DISCONTINUED | OUTPATIENT
Start: 2021-01-21 | End: 2021-01-21 | Stop reason: HOSPADM

## 2021-01-21 RX ORDER — SODIUM CHLORIDE, SODIUM LACTATE, POTASSIUM CHLORIDE, CALCIUM CHLORIDE 600; 310; 30; 20 MG/100ML; MG/100ML; MG/100ML; MG/100ML
INJECTION, SOLUTION INTRAVENOUS CONTINUOUS PRN
Status: DISCONTINUED | OUTPATIENT
Start: 2021-01-21 | End: 2021-01-21

## 2021-01-21 RX ORDER — HYDROMORPHONE HCL/PF 1 MG/ML
1 SYRINGE (ML) INJECTION
Status: DISCONTINUED | OUTPATIENT
Start: 2021-01-21 | End: 2021-01-23

## 2021-01-21 RX ORDER — ROCURONIUM BROMIDE 10 MG/ML
INJECTION, SOLUTION INTRAVENOUS AS NEEDED
Status: DISCONTINUED | OUTPATIENT
Start: 2021-01-21 | End: 2021-01-21

## 2021-01-21 RX ORDER — OXYCODONE HYDROCHLORIDE 5 MG/1
5 TABLET ORAL EVERY 4 HOURS PRN
Status: DISCONTINUED | OUTPATIENT
Start: 2021-01-21 | End: 2021-01-23

## 2021-01-21 RX ORDER — GLYCOPYRROLATE 0.2 MG/ML
INJECTION INTRAMUSCULAR; INTRAVENOUS AS NEEDED
Status: DISCONTINUED | OUTPATIENT
Start: 2021-01-21 | End: 2021-01-21

## 2021-01-21 RX ADMIN — PHENYLEPHRINE HYDROCHLORIDE 100 MCG: 10 INJECTION INTRAVENOUS at 17:16

## 2021-01-21 RX ADMIN — GLYCOPYRROLATE 0.6 MG: 0.2 INJECTION, SOLUTION INTRAMUSCULAR; INTRAVENOUS at 18:28

## 2021-01-21 RX ADMIN — CEFAZOLIN SODIUM 2000 MG: 2 SOLUTION INTRAVENOUS at 21:19

## 2021-01-21 RX ADMIN — Medication 1 TABLET: at 10:55

## 2021-01-21 RX ADMIN — METRONIDAZOLE 500 MG: 500 INJECTION, SOLUTION INTRAVENOUS at 16:40

## 2021-01-21 RX ADMIN — Medication 125 MG: at 23:36

## 2021-01-21 RX ADMIN — HYDROMORPHONE HYDROCHLORIDE 0.5 MG: 1 INJECTION, SOLUTION INTRAMUSCULAR; INTRAVENOUS; SUBCUTANEOUS at 17:06

## 2021-01-21 RX ADMIN — SODIUM CHLORIDE 50 ML/HR: 0.9 INJECTION, SOLUTION INTRAVENOUS at 05:33

## 2021-01-21 RX ADMIN — ROCURONIUM BROMIDE 20 MG: 10 SOLUTION INTRAVENOUS at 17:24

## 2021-01-21 RX ADMIN — PHENYLEPHRINE HYDROCHLORIDE 20 MCG/MIN: 10 INJECTION INTRAVENOUS at 16:45

## 2021-01-21 RX ADMIN — ACETAMINOPHEN 650 MG: 325 TABLET, FILM COATED ORAL at 10:55

## 2021-01-21 RX ADMIN — ACETAMINOPHEN 650 MG: 325 TABLET, FILM COATED ORAL at 21:19

## 2021-01-21 RX ADMIN — Medication 125 MG: at 05:32

## 2021-01-21 RX ADMIN — ONDANSETRON 4 MG: 2 INJECTION INTRAMUSCULAR; INTRAVENOUS at 18:32

## 2021-01-21 RX ADMIN — HYDROMORPHONE HYDROCHLORIDE 0.5 MG: 1 INJECTION, SOLUTION INTRAMUSCULAR; INTRAVENOUS; SUBCUTANEOUS at 17:38

## 2021-01-21 RX ADMIN — PROPOFOL 110 MG: 10 INJECTION, EMULSION INTRAVENOUS at 16:29

## 2021-01-21 RX ADMIN — HYDROMORPHONE HYDROCHLORIDE 1 MG: 1 INJECTION, SOLUTION INTRAMUSCULAR; INTRAVENOUS; SUBCUTANEOUS at 11:32

## 2021-01-21 RX ADMIN — CEFAZOLIN SODIUM 1000 MG: 1 SOLUTION INTRAVENOUS at 11:34

## 2021-01-21 RX ADMIN — SODIUM CHLORIDE 125 ML/HR: 0.9 INJECTION, SOLUTION INTRAVENOUS at 11:33

## 2021-01-21 RX ADMIN — SODIUM CHLORIDE, SODIUM LACTATE, POTASSIUM CHLORIDE, AND CALCIUM CHLORIDE: .6; .31; .03; .02 INJECTION, SOLUTION INTRAVENOUS at 16:26

## 2021-01-21 RX ADMIN — DEXAMETHASONE SODIUM PHOSPHATE 8 MG: 10 INJECTION, SOLUTION INTRAMUSCULAR; INTRAVENOUS at 16:42

## 2021-01-21 RX ADMIN — PANTOPRAZOLE SODIUM 40 MG: 40 TABLET, DELAYED RELEASE ORAL at 10:55

## 2021-01-21 RX ADMIN — FENTANYL CITRATE 100 MCG: 50 INJECTION, SOLUTION INTRAMUSCULAR; INTRAVENOUS at 16:29

## 2021-01-21 RX ADMIN — LIDOCAINE HYDROCHLORIDE 50 MG: 10 INJECTION, SOLUTION EPIDURAL; INFILTRATION; INTRACAUDAL at 16:29

## 2021-01-21 RX ADMIN — PHENYLEPHRINE HYDROCHLORIDE 100 MCG: 10 INJECTION INTRAVENOUS at 17:18

## 2021-01-21 RX ADMIN — ROCURONIUM BROMIDE 30 MG: 10 SOLUTION INTRAVENOUS at 16:35

## 2021-01-21 RX ADMIN — FENTANYL CITRATE 50 MCG: 50 INJECTION INTRAMUSCULAR; INTRAVENOUS at 19:11

## 2021-01-21 RX ADMIN — METRONIDAZOLE 500 MG: 500 INJECTION, SOLUTION INTRAVENOUS at 12:28

## 2021-01-21 RX ADMIN — SODIUM CHLORIDE: 0.9 INJECTION, SOLUTION INTRAVENOUS at 16:33

## 2021-01-21 RX ADMIN — SODIUM CHLORIDE, SODIUM LACTATE, POTASSIUM CHLORIDE, AND CALCIUM CHLORIDE 125 ML/HR: .6; .31; .03; .02 INJECTION, SOLUTION INTRAVENOUS at 21:19

## 2021-01-21 RX ADMIN — CEFAZOLIN SODIUM 1000 MG: 1 INJECTION, POWDER, FOR SOLUTION INTRAMUSCULAR; INTRAVENOUS at 16:33

## 2021-01-21 RX ADMIN — NEOSTIGMINE METHYLSULFATE 4 MG: 1 INJECTION INTRAVENOUS at 18:28

## 2021-01-21 RX ADMIN — Medication 100 MG: at 16:29

## 2021-01-21 RX ADMIN — PHENYLEPHRINE HYDROCHLORIDE 100 MCG: 10 INJECTION INTRAVENOUS at 16:37

## 2021-01-21 RX ADMIN — Medication 125 MG: at 11:38

## 2021-01-21 NOTE — OP NOTE
OPERATIVE REPORT  PATIENT NAME: Barrett Villavicencio    :  1950  MRN: [id number]  Pt Location: AN OR ROOM 02    SURGERY DATE: 2021    Surgeon(s) and Role:     * Siomara Souza MD - Primary     * Mandy Valadez MD - Assisting    Preop Diagnosis:  Free intraperitoneal air [K66 8]    Post-Op Diagnosis Codes:     * Free intraperitoneal air [K66 8]    Procedure(s) (LRB):  LAPAROTOMY EXPLORATORY W/ REPAIR OF UMBLICAL HERNIA (N/A)    Specimen(s):  * No specimens in log *    Estimated Blood Loss:   Minimal    Drains:  Closed/Suction Drain Right Abdomen Bulb (Active)   Status Open to gravity drainage 21   Number of days: 0       Closed/Suction Drain Left Abdomen (Active)   Status Open to gravity drainage 21   Number of days: 0       Urethral Catheter Latex 16 Fr  (Active)   Number of days: 0       Anesthesia Type:   General    Operative Indications:  Free intraperitoneal air [K66 8]      Operative Findings:  Independent, nonsmoker, ASA 3, wound class 3, BMI 26, weight 160, height 66    (+) Hyperlipidemia       GI/HEPATIC   (+) Acute GI bleeding   (+) Esophageal reflux       Other   (+) C  difficile diarrhea   (+) H/O hernia repair   (+) Pneumoperitoneum         Complications:   None    Procedure and Technique:  Patient was identified visually and via armband  Placed in supine position  After general anesthesia the abdomen was prepped and draped in a sterile fashion  She was placed in lithotomy position  The urinary as well as rectal tube was then placed  Time-out was then completed  Antibiotics up-to-date  Care was taken towards padding of extremities  Incision was made in the lower midline abdomen suspecting perforation in the sigmoid colon  Incision was carried down through skin subcutaneous tissue  Free air was noted in the peritoneum  The peritoneum was then divided under direct vision  Bookwalter retractor was then placed    The small bowel was packed away with a Bookwalter retractor  White line of Toldt the sigmoid colon was freed in order to expose the sigmoid colon in is entirety  At this point the rectal tube was insufflated with oxygen that 1 L per hour  There is no evidence for perforation or leak the sigmoid colon  At this point the self-retaining retractor elements were removed and the entire colon was examined  Air bubbles was noted between the colon and the mesentery in the right upper quadrant of the abdomen at the [facility] flexure  Clearly this was the area that had micro perforation  There is no evidence for external trauma in the remaining portion of the colon  Air was once again insufflated around the entire colon there was no evidence for perforation on this examination  Significant air was used to insufflate the colon and he was further compressed in the right upper quadrant  Again there was no air leak  Decision was made against  resection since is micro perforation has since healed  Therefore drains were then placed in the right upper quadrant the abdomen as well as in the right gutter down to the pelvis these were above fixed to the skin  0 25% Marcaine was utilized as a block in the transverse abdominus region  Air abdomen was then irrigated and aspirated dry  Hemostasis was assured  Several interrupted 1  Vicryl sutures were then placed followed by running 1  PDS suture tied at the midline  Clips were applied to the skin  MARÍA drains were affixed  At this point the anesthesia department completed a transabdominal muscular anesthetic block  She was awakened anesthesia transfer the recovery room stable condition  Sponge instrument count correct x2     I was present for the entire procedure    Patient Disposition:  PACU     SIGNATURE: Siomara Souza MD  DATE: January 21, 2021  TIME: 6:43 PM

## 2021-01-21 NOTE — ASSESSMENT & PLAN NOTE
· History of hernia repair with Dr Keon Tirado on 1/4/2021  · She was seen in her office day prior to admission and subsequently sent into the emergency department with more episodes of diarrhea  · Feel that her acute GI bleeding is not related to surgery, and merely coincidental   · CT revealing stable postsurgical findings    · Outpatient follow-up as directed with surgery team

## 2021-01-21 NOTE — PROGRESS NOTES
Progress Note - General Surgery   Tyra Perrin 65 y o  female MRN: [id number]  Unit/Bed#: W -01 Encounter: [id number]    Assessment:  66 yo F presenting with GI bleed, likely diverticular  Plan:  EGD/colonoscopy was performed yesterday, most likely self limited diverticular bleeding  PPx: PPI  Hb stable  No plans for surgical intervention at this time      Subjective/Objective     Subjective: No event overnight, no bloody bm since yesterday, Denies abdominal pain, nausea or vomiting    Objective:  Blood pressure 131/75, pulse 102, temperature 98 5 °F (36 9 °C), temperature source Oral, resp  rate 16, height 5' 6" (1 676 m), weight 74 kg (163 lb 2 3 oz), SpO2 90 %  ,Body mass index is 26 33 kg/m²        Intake/Output Summary (Last 24 hours) at 1/21/2021 0630  Last data filed at 1/21/2021 0501  Gross per 24 hour   Intake 2395 58 ml   Output 400 ml   Net 1995 58 ml       Invasive Devices     Peripheral Intravenous Line            Peripheral IV 01/19/21 Left Antecubital 1 day                Physical Exam:  Gen: NAD, Comfortable  Neuro: A&O, No focal deficits  Head: Normal Cephalic, Atraumatic  Eye: EOMI, PERRLA, No scleral icterus  Neck: Supple, No JVD, Midline trachea  CV: RRR, Cap refill <2 sec  Pulm: Normal work of breathing, no respiratory distress  Abd: Soft, Non-Distended, Non-Tender  Ext: No edema, Non-tender  Skin: warm, dry, intact       Lab, Imaging and other studies:  Recent Labs     01/19/21  1253 01/20/21  0447 01/20/21  1110 01/21/21  0436   WBC 7 77 8 85  --  10 48*   HGB 12 4 10 7* 10 2* 10 6*    320  --  329     Recent Labs     01/19/21  1253 01/20/21  0447 01/21/21  0436   SODIUM 140 143 144   K 4 0 3 7 3 5    109* 108   CO2 27 27 27   BUN 19 13 7   CREATININE 0 90 0 82 0 89   CALCIUM 8 9 8 2* 8 4             VTE Pharmacologic Prophylaxis: Sequential compression device (Venodyne)   VTE Mechanical Prophylaxis: sequential compression device

## 2021-01-21 NOTE — APP STUDENT NOTE
Assessment/Plan:  1  Acute GI bleeding    -EGD: "1  Possible Candida esophagitis 2  Moderate gastritis 3  Gastric polyps, likely fundic gland"   -Colonoscopy: "Multiple large, severe extensive diverticular containing stool with no bleeding in the transverse colon, descending colon, sigmoid colon and rectosigmoid  Small, internal hemorrhoids  "   -Continue PPI    -Clear liquid diet started this morning, if tolerated discharge this afternoon    -Abdominal discomfort is still present post EGD    -Hemoglobin 10 6 this morning     2  C  Difficile diarrhea    -PCR positive, EIA negative    -As per GI, continue vancomycin   -Treat for 14 days     3  H/O Inguinal Hernia Repair    -Repaired on 1/4/2021 by Dr Kiley Brumfield    -Bryn Gonzalez showed stable postsurgical findings   -Outpatient follow up     4  Hyperlipidemia    -Continue statin       VTE Pharmacologic Prophylaxis:   Pharmacologic: Pharmacologic VTE Prophylaxis contraindicated due to GI Bleed  Mechanical VTE Prophylaxis in Place: No    Patient Centered Rounds: I have performed bedside rounds with nursing staff today  Discussions with Specialists or Other Care Team Provider:     Education and Discussions with Family / Patient: patient     Time Spent for Care: 20 minutes  More than 50% of total time spent on counseling and coordination of care as described above  Current Length of Stay: 2 day(s)    Current Patient Status: Inpatient   Certification Statement: The patient, who was admitted as an inpatient, is being discharged sooner than originally anticipated due to: stable conditions s/p EGD and colonoscopy    Discharge Plan: plan for DC this afternoon after tolerates clear liquid diet    Code Status: Level 1 - Full Code      Subjective:   Patient is reporting "abdominal discomfort" after procedures yesterday  She is passing gas, but has not passed a BM yet  She is upset that she on a clear liquid diet because she is hungry   Denies dizziness, chest pain, SOB, nausea, vomiting, fever, chills  Objective:     Vitals:   Temp (24hrs), Av 1 °F (36 7 °C), Min:97 6 °F (36 4 °C), Max:98 5 °F (36 9 °C)    Temp:  [97 6 °F (36 4 °C)-98 5 °F (36 9 °C)] 98 5 °F (36 9 °C)  HR:  [] 105  Resp:  [16-20] 16  BP: ()/(50-99) 161/99  SpO2:  [90 %-98 %] 94 %  Body mass index is 26 33 kg/m²  Input and Output Summary (last 24 hours): Intake/Output Summary (Last 24 hours) at 2021 0852  Last data filed at 2021 0501  Gross per 24 hour   Intake 2395 58 ml   Output 400 ml   Net 1995 58 ml       Physical Exam:     Physical Exam  Constitutional:       General: She is not in acute distress  Appearance: She is not toxic-appearing  HENT:      Head: Normocephalic and atraumatic  Cardiovascular:      Rate and Rhythm: Normal rate and regular rhythm  Heart sounds: No murmur  No friction rub  No gallop  Pulmonary:      Effort: Pulmonary effort is normal       Breath sounds: No wheezing, rhonchi or rales  Abdominal:      General: Bowel sounds are normal       Tenderness: There is abdominal tenderness  There is no guarding  Musculoskeletal:      Right lower leg: No edema  Left lower leg: No edema  Skin:     General: Skin is warm  Findings: No bruising or erythema  Neurological:      General: No focal deficit present  Mental Status: She is alert  Additional Data:     Labs:    Results from last 7 days   Lab Units 21  0436  21  0447   WBC Thousand/uL 10 48*  --  8 85   HEMOGLOBIN g/dL 10 6*   < > 10 7*   HEMATOCRIT % 33 2*   < > 34 1*   PLATELETS Thousands/uL 329  --  320   NEUTROS PCT %  --   --  68   LYMPHS PCT %  --   --  22   MONOS PCT %  --   --  6   EOS PCT %  --   --  2    < > = values in this interval not displayed       Results from last 7 days   Lab Units 21  0436  21  1253   SODIUM mmol/L 144   < > 140   POTASSIUM mmol/L 3 5   < > 4 0   CHLORIDE mmol/L 108   < > 104   CO2 mmol/L 27   < > 27   BUN mg/dL 7 < > 19   CREATININE mg/dL 0 89   < > 0 90   ANION GAP mmol/L 9   < > 9   CALCIUM mg/dL 8 4   < > 8 9   ALBUMIN g/dL  --   --  3 5   TOTAL BILIRUBIN mg/dL  --   --  0 33   ALK PHOS U/L  --   --  83   ALT U/L  --   --  65   AST U/L  --   --  31   GLUCOSE RANDOM mg/dL 123   < > 76    < > = values in this interval not displayed  Results from last 7 days   Lab Units 01/19/21  1253   INR  0 96             Results from last 7 days   Lab Units 01/19/21  1253   LACTIC ACID mmol/L 1 7           * I Have Reviewed All Lab Data Listed Above  * Additional Pertinent Lab Tests Reviewed: Karla 64 Admission Reviewed    Imaging:    Imaging Reports Reviewed Today Include:   Imaging Personally Reviewed by Myself Includes:      Recent Cultures (last 7 days):     Results from last 7 days   Lab Units 01/20/21  0001   C DIFF TOXIN B BY PCR  Positive*       Last 24 Hours Medication List:   Current Facility-Administered Medications   Medication Dose Route Frequency Provider Last Rate    acetaminophen  650 mg Oral Q6H PRN Asuncion Goltz, PA-C      atorvastatin  10 mg Oral Daily With [employer] Muñoz      calcium carbonate-vitamin D  1 tablet Oral Daily With Breakfast Asuncion Goltz, PA-C      ondansetron  4 mg Intravenous Q6H PRN Asuncion Goltz, PA-C      pantoprazole  40 mg Intravenous Q12H [address] Asuncion Goltz, PA-C      sodium chloride  50 mL/hr Intravenous Continuous Ninfa Negrete PA-C 50 mL/hr (01/21/21 0533)    vancomycin  125 mg Oral Q6H [address] Cindy Amato PA-C          Today, Patient Was Seen By: Gita To    ** Please Note: Dictation voice to text software may have been used in the creation of this document   **

## 2021-01-21 NOTE — ASSESSMENT & PLAN NOTE
· Noted to have PCR positive but EIA  · Given no prior history and evidence of diarrhea, started on vancomycin  · Plan for treatment x 14 days  · Continue without patient vancomycin for a total of 14 days     · Continue through to 2/3/21

## 2021-01-21 NOTE — ASSESSMENT & PLAN NOTE
 CT scan on admission with no acute findings to account for GI bleeding  No colitis or abscess noted  She has stable postsurgical findings   S/P EGD and colonoscopy 1/20 - likely self limited diverticular bleed  Old blood noted and poor prep      EGD with moderate gastritis and candida esophagitis  o Continue PPI   GI and general surgery following - now with pneumoperitoneum and proceeding with exploratory laparotomy   Hemoglobin stable and has not required any transfusions    Lab Results   Component Value Date    HGB 10 2 (L) 01/20/2021    HGB 10 7 (L) 01/20/2021    HGB 12 4 01/19/2021    HGB 14 3 01/18/2021

## 2021-01-21 NOTE — PLAN OF CARE
Problem: PAIN - ADULT  Goal: Verbalizes/displays adequate comfort level or baseline comfort level  Description: Interventions:  - Encourage patient to monitor pain and request assistance  - Assess pain using appropriate pain scale  - Administer analgesics based on type and severity of pain and evaluate response  - Implement non-pharmacological measures as appropriate and evaluate response  - Consider cultural and social influences on pain and pain management  - Notify physician/advanced practitioner if interventions unsuccessful or patient reports new pain  Outcome: Progressing     Problem: Knowledge Deficit  Goal: Patient/family/caregiver demonstrates understanding of disease process, treatment plan, medications, and discharge instructions  Description: Complete learning assessment and assess knowledge base    Interventions:  - Provide teaching at level of understanding  - Provide teaching via preferred learning methods  Outcome: Progressing     Problem: GASTROINTESTINAL - ADULT  Goal: Maintains or returns to baseline bowel function  Description: INTERVENTIONS:  - Assess bowel function  - Encourage oral fluids to ensure adequate hydration  - Administer IV fluids if ordered to ensure adequate hydration  - Administer ordered medications as needed  - Encourage mobilization and activity  - Consider nutritional services referral to assist patient with adequate nutrition and appropriate food choices  Outcome: Progressing  Goal: Maintains adequate nutritional intake  Description: INTERVENTIONS:  - Monitor percentage of each meal consumed  - Identify factors contributing to decreased intake, treat as appropriate  - Assist with meals as needed  - Monitor I&O, weight, and lab values if indicated  - Obtain nutrition services referral as needed  Outcome: Progressing

## 2021-01-21 NOTE — ASSESSMENT & PLAN NOTE
· KUB performed this morning was noted to show free air under the diaphragm  This is new from CT scan on admission  · Concerned that this could be related to EGD/colonoscopy performed yesterday  · General surgery and GI discussed  · General surgery taking the patient to the OR for exploratory laparotomy this afternoon  · Patient will be made NPO  · Continue IV fluid hydration and pain control  · Family was updated by both GI and General surgery

## 2021-01-21 NOTE — PROGRESS NOTES
Progress Note - Familia Holland 65 y o  female MRN: [id number]    Unit/Bed#: W -01 Encounter: [id number]    Assessment and Plan:   Principal Problem:    Acute GI bleeding  Active Problems:    Hyperlipidemia    H/O hernia repair    C  difficile diarrhea    #1  Lower GI bleeding: suspect self limited diverticular bleed, old blood on incomplete colonoscopy yesterday  hgb stable, no further bleeding  -monitor hgb  -monitor for recurrent bleeding  -repeat colonoscopy in 3 months due to incomplete, can keep original appt in April 2021     #2  C diff infection: patient has never had c diff, had positive toxin, negative EIA  -Due to diarrhea prior to admission and never having the infection prior, would recommend treatment with vanco 125 mg Q6 hours for 14 days to be precautious  Patient without diarrhea currently    #3  Pneumoperitoneum: patient had some abdominal pain after procedure yesterday which improved with passing gas  This AM was complaining of bilateral shoulder pain  KUB performed is showing free air under both diaphragms, not septic  -awaiting to hear back from surgery  GI attending aware  -NPO      ----------------------------------------------------------------------------------------------------------------    Subjective:     Patient complaining of bilateral shoulder pain today, abdomen is uncomfortable but no significant localizing pain  No N/V, no further bleeding since procedure yesterday    Objective:     Vitals: Blood pressure 161/99, pulse 105, temperature 98 5 °F (36 9 °C), temperature source Oral, resp  rate 16, height 5' 6" (1 676 m), weight 74 kg (163 lb 2 3 oz), SpO2 94 %  ,Body mass index is 26 33 kg/m²        Intake/Output Summary (Last 24 hours) at 1/21/2021 8910  Last data filed at 1/21/2021 0501  Gross per 24 hour   Intake 2395 58 ml   Output 400 ml   Net 1995 58 ml       Physical Exam:     General Appearance: Alert, appears stated age and cooperative  Lungs: Clear to auscultation bilaterally, no rales or rhonchi, no labored breathing/accessory muscle use  Heart: Regular rate and rhythm, S1, S2 normal, no murmur, click, rub or gallop  Abdomen: Soft, non-tender, non-distended; bowel sounds normal; no masses or no organomegaly  Extremities: No cyanosis, clubbing, or edema    Invasive Devices     Peripheral Intravenous Line            Peripheral IV 01/19/21 Left Antecubital 1 day                Lab Results:  Results from last 7 days   Lab Units 01/21/21  0436  01/20/21  0447   WBC Thousand/uL 10 48*  --  8 85   HEMOGLOBIN g/dL 10 6*   < > 10 7*   HEMATOCRIT % 33 2*   < > 34 1*   PLATELETS Thousands/uL 329  --  320   NEUTROS PCT %  --   --  68   LYMPHS PCT %  --   --  22   MONOS PCT %  --   --  6   EOS PCT %  --   --  2    < > = values in this interval not displayed  Results from last 7 days   Lab Units 01/21/21  0436  01/19/21  1253   POTASSIUM mmol/L 3 5   < > 4 0   CHLORIDE mmol/L 108   < > 104   CO2 mmol/L 27   < > 27   BUN mg/dL 7   < > 19   CREATININE mg/dL 0 89   < > 0 90   CALCIUM mg/dL 8 4   < > 8 9   ALK PHOS U/L  --   --  83   ALT U/L  --   --  65   AST U/L  --   --  31    < > = values in this interval not displayed  Invalid input(s): BILI  Results from last 7 days   Lab Units 01/19/21  1253   INR  0 96     Results from last 7 days   Lab Units 01/19/21  1253   LIPASE u/L 179       Imaging Studies: I have personally reviewed pertinent imaging studies  Ct Abdomen Pelvis With Contrast    Result Date: 1/19/2021  Impression: No acute inflammatory process identified  Diverticulosis without evidence of acute diverticulitis  Small volume pneumomediastinum and small focus of pneumoperitoneum  These are likely secondary to recent laparoscopic surgery  No free fluid in the abdomen to suggest visceral perforation  Mild edema in the right lower quadrant intra-abdominal fat compatible with sequela of recent laparoscopic surgery  The study was marked in [facility] [place] for immediate notification  Workstation performed: [id number]

## 2021-01-21 NOTE — PROGRESS NOTES
The pantoprazole has been converted to Oral per [facility] IV-to-PO Auto-Conversion Protocol for Adults as approved by the Pharmacy and Therapeutics Committee  The patient met all eligible criteria:  3 Age = 23years old   2) Received at least one dose of the IV form   3) Receiving at least one other scheduled oral/enteral medication   4) Tolerating an oral/enteral diet   and did not have any exclusions:   1) Critical care patient   2) Active GI bleed (IF assessing H2RAs or PPIs)   3) Continuous tube feeding (IF assessing cipro, doxycycline, levofloxacin, minocycline, rifampin, or voriconazole)   4) Receiving PO vancomycin (IF assessing metronidazole)   5) Persistent nausea and/or vomiting   6) Ileus or gastrointestinal obstruction   7) Lino/nasogastric tube set for continuous suction   8) Specific order not to automatically convert to PO (in the order's comments or if discussed in the most recent Infectious Disease or primary team's progress notes)

## 2021-01-22 PROBLEM — D72.829 LEUKOCYTOSIS: Status: ACTIVE | Noted: 2021-01-22

## 2021-01-22 PROBLEM — Z87.19 HISTORY OF GI DIVERTICULAR BLEED: Status: ACTIVE | Noted: 2021-01-19

## 2021-01-22 LAB
ALBUMIN SERPL BCP-MCNC: 2.6 G/DL (ref 3.5–5)
ALP SERPL-CCNC: 54 U/L (ref 46–116)
ALT SERPL W P-5'-P-CCNC: 32 U/L (ref 12–78)
ANION GAP SERPL CALCULATED.3IONS-SCNC: 9 MMOL/L (ref 4–13)
AST SERPL W P-5'-P-CCNC: 21 U/L (ref 5–45)
BASOPHILS # BLD AUTO: 0.02 THOUSANDS/ΜL (ref 0–0.1)
BASOPHILS NFR BLD AUTO: 0 % (ref 0–1)
BILIRUB SERPL-MCNC: 0.4 MG/DL (ref 0.2–1)
BUN SERPL-MCNC: 7 MG/DL (ref 5–25)
CALCIUM ALBUM COR SERPL-MCNC: 9.1 MG/DL (ref 8.3–10.1)
CALCIUM SERPL-MCNC: 8 MG/DL (ref 8.3–10.1)
CHLORIDE SERPL-SCNC: 107 MMOL/L (ref 100–108)
CO2 SERPL-SCNC: 24 MMOL/L (ref 21–32)
CREAT SERPL-MCNC: 0.91 MG/DL (ref 0.6–1.3)
EOSINOPHIL # BLD AUTO: 0 THOUSAND/ΜL (ref 0–0.61)
EOSINOPHIL NFR BLD AUTO: 0 % (ref 0–6)
ERYTHROCYTE [DISTWIDTH] IN BLOOD BY AUTOMATED COUNT: 14 % (ref 11.6–15.1)
GFR SERPL CREATININE-BSD FRML MDRD: 63 ML/MIN/1.73SQ M
GLUCOSE SERPL-MCNC: 160 MG/DL (ref 65–140)
HCT VFR BLD AUTO: 32.2 % (ref 34.8–46.1)
HGB BLD-MCNC: 10.5 G/DL (ref 11.5–15.4)
IMM GRANULOCYTES # BLD AUTO: 0.07 THOUSAND/UL (ref 0–0.2)
IMM GRANULOCYTES NFR BLD AUTO: 1 % (ref 0–2)
LYMPHOCYTES # BLD AUTO: 0.45 THOUSANDS/ΜL (ref 0.6–4.47)
LYMPHOCYTES NFR BLD AUTO: 3 % (ref 14–44)
MCH RBC QN AUTO: 30.7 PG (ref 26.8–34.3)
MCHC RBC AUTO-ENTMCNC: 32.6 G/DL (ref 31.4–37.4)
MCV RBC AUTO: 94 FL (ref 82–98)
MONOCYTES # BLD AUTO: 0.48 THOUSAND/ΜL (ref 0.17–1.22)
MONOCYTES NFR BLD AUTO: 3 % (ref 4–12)
NEUTROPHILS # BLD AUTO: 13.2 THOUSANDS/ΜL (ref 1.85–7.62)
NEUTS SEG NFR BLD AUTO: 93 % (ref 43–75)
NRBC BLD AUTO-RTO: 0 /100 WBCS
PLATELET # BLD AUTO: 309 THOUSANDS/UL (ref 149–390)
PMV BLD AUTO: 8.8 FL (ref 8.9–12.7)
POTASSIUM SERPL-SCNC: 3.4 MMOL/L (ref 3.5–5.3)
PROT SERPL-MCNC: 5.7 G/DL (ref 6.4–8.2)
RBC # BLD AUTO: 3.42 MILLION/UL (ref 3.81–5.12)
SODIUM SERPL-SCNC: 140 MMOL/L (ref 136–145)
WBC # BLD AUTO: 14.22 THOUSAND/UL (ref 4.31–10.16)

## 2021-01-22 PROCEDURE — 97163 PT EVAL HIGH COMPLEX 45 MIN: CPT

## 2021-01-22 PROCEDURE — 80053 COMPREHEN METABOLIC PANEL: CPT | Performed by: SURGERY

## 2021-01-22 PROCEDURE — 99232 SBSQ HOSP IP/OBS MODERATE 35: CPT | Performed by: INTERNAL MEDICINE

## 2021-01-22 PROCEDURE — 85025 COMPLETE CBC W/AUTO DIFF WBC: CPT | Performed by: SURGERY

## 2021-01-22 PROCEDURE — 99024 POSTOP FOLLOW-UP VISIT: CPT | Performed by: SURGERY

## 2021-01-22 PROCEDURE — 99232 SBSQ HOSP IP/OBS MODERATE 35: CPT | Performed by: PHYSICIAN ASSISTANT

## 2021-01-22 RX ORDER — HEPARIN SODIUM 5000 [USP'U]/ML
5000 INJECTION, SOLUTION INTRAVENOUS; SUBCUTANEOUS EVERY 8 HOURS SCHEDULED
Status: DISCONTINUED | OUTPATIENT
Start: 2021-01-22 | End: 2021-01-24

## 2021-01-22 RX ORDER — DEXTROSE, SODIUM CHLORIDE, AND POTASSIUM CHLORIDE 5; .45; .15 G/100ML; G/100ML; G/100ML
75 INJECTION INTRAVENOUS CONTINUOUS
Status: DISCONTINUED | OUTPATIENT
Start: 2021-01-22 | End: 2021-01-25

## 2021-01-22 RX ADMIN — ACETAMINOPHEN 650 MG: 325 TABLET, FILM COATED ORAL at 06:33

## 2021-01-22 RX ADMIN — DEXTROSE, SODIUM CHLORIDE, AND POTASSIUM CHLORIDE 75 ML/HR: 5; .45; .15 INJECTION INTRAVENOUS at 11:34

## 2021-01-22 RX ADMIN — CEFAZOLIN SODIUM 2000 MG: 2 SOLUTION INTRAVENOUS at 18:19

## 2021-01-22 RX ADMIN — PANTOPRAZOLE SODIUM 40 MG: 40 TABLET, DELAYED RELEASE ORAL at 06:33

## 2021-01-22 RX ADMIN — METRONIDAZOLE 500 MG: 500 INJECTION, SOLUTION INTRAVENOUS at 19:49

## 2021-01-22 RX ADMIN — HEPARIN SODIUM 5000 UNITS: 5000 INJECTION INTRAVENOUS; SUBCUTANEOUS at 21:52

## 2021-01-22 RX ADMIN — OXYCODONE HYDROCHLORIDE 5 MG: 5 TABLET ORAL at 18:31

## 2021-01-22 RX ADMIN — Medication 125 MG: at 18:19

## 2021-01-22 RX ADMIN — HEPARIN SODIUM 5000 UNITS: 5000 INJECTION INTRAVENOUS; SUBCUTANEOUS at 15:21

## 2021-01-22 RX ADMIN — Medication 125 MG: at 12:11

## 2021-01-22 RX ADMIN — CEFAZOLIN SODIUM 2000 MG: 2 SOLUTION INTRAVENOUS at 02:17

## 2021-01-22 RX ADMIN — ACETAMINOPHEN 650 MG: 325 TABLET, FILM COATED ORAL at 21:51

## 2021-01-22 RX ADMIN — ACETAMINOPHEN 650 MG: 325 TABLET, FILM COATED ORAL at 15:20

## 2021-01-22 RX ADMIN — Medication 125 MG: at 06:33

## 2021-01-22 RX ADMIN — METRONIDAZOLE 500 MG: 500 INJECTION, SOLUTION INTRAVENOUS at 04:08

## 2021-01-22 RX ADMIN — METRONIDAZOLE 500 MG: 500 INJECTION, SOLUTION INTRAVENOUS at 11:30

## 2021-01-22 RX ADMIN — CEFAZOLIN SODIUM 2000 MG: 2 SOLUTION INTRAVENOUS at 10:54

## 2021-01-22 RX ADMIN — PANTOPRAZOLE SODIUM 40 MG: 40 TABLET, DELAYED RELEASE ORAL at 15:20

## 2021-01-22 NOTE — ASSESSMENT & PLAN NOTE
 CT scan on admission with no acute findings to account for GI bleeding  No colitis or abscess noted  She has stable postsurgical findings   S/P EGD and colonoscopy 1/20 - likely self limited diverticular bleed  Old blood noted and poor prep      EGD with moderate gastritis and candida esophagitis  o Continue PPI   Hemoglobin stable and has not required any transfusions    Lab Results   Component Value Date    HGB 10 5 (L) 01/22/2021    HGB 10 6 (L) 01/21/2021    HGB 10 2 (L) 01/20/2021    HGB 10 7 (L) 01/20/2021

## 2021-01-22 NOTE — PROGRESS NOTES
Progress Note - Edward Villarreal 65 y o  female MRN: [id number]    Unit/Bed#: W -01 Encounter: [id number]    Assessment and Plan:   Principal Problem:    Pneumoperitoneum  Active Problems:    Hyperlipidemia    Acute GI bleeding    H/O hernia repair    C  difficile diarrhea    #1  Lower GI bleeding: suspect self limited diverticular bleed, old blood on incomplete colonoscopy 2 days ago  hgb stable, no further bleeding  -monitor hgb  -monitor for recurrent bleeding  -repeat colonoscopy in 3 months due to incomplete, can keep original appt in April 2021      #2  C diff infection: patient has never had c diff, had positive toxin, negative EIA  -Due to diarrhea prior to admission and never having the infection prior, would recommend treatment with vanco 125 mg Q6 hours for 14 days to be precautious      #3  Pneumoperitoneum: s/p ex lap yesterday, no resection needed, thought to have self sealing colonic perforation, no active perforation with insufflation of colon with air during surgery  -further management per surgery team      Cheko Burciaga will sign off at this time and defer further management to surgery, call with any concerns or questions      ----------------------------------------------------------------------------------------------------------------    Subjective:     Patient reports doing relatively well today, pain controlled, still with catheter in, no rectal bleeding  Objective:     Vitals: Blood pressure 137/70, pulse (!) 106, temperature 98 9 °F (37 2 °C), temperature source Oral, resp  rate 18, height 5' 6" (1 676 m), weight 74 kg (163 lb 2 3 oz), SpO2 93 %  ,Body mass index is 26 33 kg/m²        Intake/Output Summary (Last 24 hours) at 1/22/2021 1049  Last data filed at 1/22/2021 1021  Gross per 24 hour   Intake 3955 42 ml   Output 725 ml   Net 3230 42 ml       Physical Exam:     General Appearance: Alert, appears stated age and cooperative  Lungs: Clear to auscultation bilaterally, no rales or rhonchi, no labored breathing/accessory muscle use  Heart: mild tachycardia, regular rhythm, S1, S2 normal, no murmur, click, rub or gallop  Abdomen: Soft, non-tender, distended, tender at incision sites, two drains in place; bowel sounds normal; no masses or no organomegaly  Extremities: No cyanosis, clubbing, or edema    Invasive Devices     Peripheral Intravenous Line            Peripheral IV 01/19/21 Left Antecubital 2 days    Peripheral IV 01/21/21 Right Wrist less than 1 day          Drain            Closed/Suction Drain Left Abdomen Bulb less than 1 day    Closed/Suction Drain Right Abdomen Bulb less than 1 day                Lab Results:  Results from last 7 days   Lab Units 01/22/21  0520   WBC Thousand/uL 14 22*   HEMOGLOBIN g/dL 10 5*   HEMATOCRIT % 32 2*   PLATELETS Thousands/uL 309   NEUTROS PCT % 93*   LYMPHS PCT % 3*   MONOS PCT % 3*   EOS PCT % 0     Results from last 7 days   Lab Units 01/22/21  0520   POTASSIUM mmol/L 3 4*   CHLORIDE mmol/L 107   CO2 mmol/L 24   BUN mg/dL 7   CREATININE mg/dL 0 91   CALCIUM mg/dL 8 0*   ALK PHOS U/L 54   ALT U/L 32   AST U/L 21     Invalid input(s): BILI  Results from last 7 days   Lab Units 01/19/21  1253   INR  0 96     Results from last 7 days   Lab Units 01/19/21  1253   LIPASE u/L 179       Imaging Studies: I have personally reviewed pertinent imaging studies  Xr Abdomen 1 View Kub    Result Date: 1/21/2021  Impression: Significant worsening of free air beneath the diaphragm when compared to earlier CT scan  Findings consistent with perforated viscus until proven otherwise  Emergent surgical consultation recommended  Consider follow-up CT scan of the abdomen and pelvis for further evaluation  I personally discussed this study with Beatrice Enrique on 1/21/2021 at 11:14 AM  Workstation performed: [id number]     Ct Abdomen Pelvis With Contrast    Result Date: 1/19/2021  Impression: No acute inflammatory process identified   Diverticulosis without evidence of acute diverticulitis  Small volume pneumomediastinum and small focus of pneumoperitoneum  These are likely secondary to recent laparoscopic surgery  No free fluid in the abdomen to suggest visceral perforation  Mild edema in the right lower quadrant intra-abdominal fat compatible with sequela of recent laparoscopic surgery  The study was marked in [facility] for immediate notification   Workstation performed: [id number]

## 2021-01-22 NOTE — QUICK NOTE
Post- OP Note - General Surgery   Al Strange 65 y o  female MRN: [id number]  Unit/Bed#: W -01 Encounter: [id number]    Assessment:  65 F presenting with GI bleed s/p endoscopy on 1/20 w/ subsequent findings of pneumoperitoneum  S/p ex lap with drain placement on 1/21  Plan:  NPO  IVF  Maintain JPs  I/Os  -Record UOP and MARÍA output  Maintain Bragg  DVT ppx  Pain/Nausea Control  OOB as tolerated  Incentive Spirometry      Subjective/Objective     Subjective:   Patient alert and oriented  No N/V  States pain is well controlled  No chest pains or SOB  Objective:    Blood pressure 136/64, pulse 76, temperature (!) 97 °F (36 1 °C), resp  rate 13, height 5' 6" (1 676 m), weight 74 kg (163 lb 2 3 oz), SpO2 97 %  ,Body mass index is 26 33 kg/m²  Physical Exam:   Gen: NAD  HEENT: MMM  CV: well perfused  Lungs: Normal respiratory effort on 1 5 L  Abd: soft, appropriately tender  nondistended, Incisions clean dry and intact, MARÍA drains intact with SS output  Skin: warm/ dry  Neuro:  AxO x3    VTE Pharmacologic Prophylaxis: none per primary  VTE Mechanical Prophylaxis: sequential compression device

## 2021-01-22 NOTE — ASSESSMENT & PLAN NOTE
· Secondary to microperforation  · Patient underwent EGD and colonoscopy on 1/20/2021  Subsequently she developed abdominal pain  · KUB was performed which was noted to show free air under the diaphragm bilaterally  · Patient was taken to the OR on 1/21/2021  There was no evidence of air leak with insufflation  Suspected to have micro perforation which had resolved  · Two MARÍA drains were placed and continue with minimal output at this time  · Diet advanced to clear liquid by General surgery this morning  · Continue monitoring outputs  Advance diet as per General surgery as able  · Continue IV Ancef and Flagyl secondary to microperforation  · Monitor WBC, this is currently elevated at 14  Remains afebrile  · Continue IV fluid hydration and pain control

## 2021-01-22 NOTE — PHYSICAL THERAPY NOTE
PHYSICAL THERAPY EVALUATION  NAME: Devika Minaya  AGE:   65 y o  MRN:  [id number]  ADMIT DX: Blood in stool [K92 1]  Rectal bleeding [K62 5]  Acute lower GI bleeding [K92 2]  Acute GI bleeding [K92 2]    PMH:   Past Medical History:   Diagnosis Date    Cervical cancer ([facility] [address] )     last assessed 2014    Diverticulosis     Ear problems     HL (hearing loss)     Hyperlipemia     Tinnitus      LENGTH OF STAY: 3       21 1357   PT Last Visit   PT Visit Date 21   Note Type   Note type Evaluation   Pain Assessment   Pain Assessment Tool 0-10   Pain Score 6   Pain Location/Orientation Location: Abdomen   Hospital Pain Intervention(s) Ambulation/increased activity;Repositioned;Cold applied   Home Living   Type of [facility] Two level; Laundry in basement;Bed/bath upstairs   Additional Comments Ambulates independently without AD at baseline  Prior Function   Level of [place] Independent with ADLs and functional mobility   Lives With Spouse   ADL Assistance Independent   IADLs Independent   Falls in the last 6 months 0   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)   Restrictions/Precautions   Weight Bearing Precautions Per Order No   Other Precautions Contact/isolation;Multiple lines;Telemetry; Fall Risk;Pain  (MARÍA drains x2; Masimo)   General   Additional Pertinent History s/p ex lap with repair of umbilical hernia   Family/Caregiver Present No   Cognition   Overall Cognitive Status WFL   Arousal/Participation Cooperative   Orientation Level Oriented X4   Memory Within functional limits   Following Commands Follows all commands and directions without difficulty   Comments Pt identified by name and       RLE Assessment   RLE Assessment X   Strength RLE   RLE Overall Strength 4-/5  (functionally)   LLE Assessment   LLE Assessment X   Strength LLE   LLE Overall Strength 4-/5  (functionally)   Coordination   Movements are Fluid and Coordinated 0   Coordination and Movement Description bradykinetic   Bed Mobility   Supine to Sit 4  Minimal assistance   Additional items Assist x 1;HOB elevated; Bedrails; Increased time required;Verbal cues;LE management   Sit to Supine Unable to assess  (left OOB in chair post session )   Transfers   Sit to Stand 4  Minimal assistance   Additional items Assist x 1; Increased time required;Verbal cues   Stand to Sit 4  Minimal assistance   Additional items Assist x 1; Increased time required;Verbal cues   Ambulation/Elevation   Gait pattern Improper Weight shift;Decreased foot clearance; Short stride; Excessively slow   Gait Assistance 4  Minimal assist   Additional items Assist x 1;Verbal cues   Assistive Device Rolling walker   Distance 3` bed to chair  (pt currently declining further ambulation due to fatigue)   Balance   Static Sitting Fair +   Dynamic Sitting Fair   Static Standing Fair -   Dynamic Standing Poor +   Ambulatory Poor +   Endurance Deficit   Endurance Deficit Yes   Endurance Deficit Description limited ambulation distance, fatigue, pain   Activity Tolerance   Activity Tolerance Patient limited by fatigue;Patient limited by pain   Nurse Made Aware Per RN, pt appropriate to evaluate   Assessment   Prognosis Good   Problem List Decreased strength;Decreased endurance; Impaired balance;Decreased mobility; Decreased safety awareness;Pain   Goals   Patient Goals to get better   STG Expiration Date 01/31/21   Short Term Goal #1 Pt will be able to: (1) perform bed mobility with mod I to decrease caregiver burden (2) perform sit to stand with mod I to increase level of independence and promote safe toiletting and transfers (3) ambulate at least 200` with mod I and least restrictive AD to increase activity tolerance and allow for safe community mobilization (4) increase standing balance by 1 grade to decrease risk of falls (5) negotiate at least a full flight of stairs with supervision and use of handrail to allow access to 2nd floor   PT Treatment Day 0   Plan   Treatment/Interventions Functional transfer training;LE strengthening/ROM; Elevations; Therapeutic exercise; Endurance training;Patient/family training;Equipment eval/education; Bed mobility;Gait training   PT Frequency Other (Comment)  (3-5x/week)   Recommendation   PT Discharge Recommendation Return to previous environment with social support; Other (Comment)  (HHPT and family support pending progress)   Equipment Recommended Walker  (pending progress)   Barthel Index   Feeding 10   Bathing 0   Grooming Score 5   Dressing Score 5   Bladder Score 10   Bowels Score 10   Toilet Use Score 5   Transfers (Bed/Chair) Score 10   Mobility (Level Surface) Score 0   Stairs Score 0   Barthel Index Score 55       Assessment: Pt is a 65 y o  female seen for PT evaluation s/p admit to [facility] on 1/19/2021 w/ Pneumoperitoneum  Order placed for PT  Comorbidities affecting pt's physical performance at time of assessment listed above  Personal factors affecting pt at time of IE include: multi-level environment, inability to perform IADLs, inability to perform ADLs and inability to ambulate household distances  Prior to admission, pt was was independent w/ all functional mobility w/ out AD, ambulated unrestricted distances and all terrain, lived in multi-level home and lived with   Upon evaluation: Pt requires min A for bed mobility, min A for sit to stand, and min A for ambulation with RW  Limited ambulation due to fatigue  (Please find full objective findings from PT assessment regarding body systems outlined above)  Impairments and limitations also listed above, especially due to  weakness, impaired balance, decreased endurance, gait deviations, pain, decreased activity tolerance, decreased safety awareness and fall risk  The following objective measures performed on IE also reveal limitations: Barthel Index 55/100   Pt's clinical presentation is currently unstable/unpredictable seen in pt's presentation of decreased safety awareness, fall risk, significant pain with mobility, and decline in functional mobility compared to baseline  Pt to benefit from continued skilled PT tx while in hospital and upon DC to address deficits as defined above and maximize level of functional mobility  From PT/mobility standpoint, recommendation at time of d/c would be Home PT and home with family support pending progress in order to maximize pt's functional independence and consistency w/ mobility in order to facilitate return to PLOF  Recommend progression of ambulation and initiation of HEP as appropriate        Georgia Murray, PT,DPT

## 2021-01-22 NOTE — APP STUDENT NOTE
Assessment/Plan:  1  Pneumoperitoneum   -Patient reported shoulder pain on 1/21/21 after EGD/Colonoscopy on 1/20/21 -- KUB performed 1/21/21 revealing free air under the diaphragm, suspicious for perforation    -GI surgery consulted 1/21/21 -- patient taken to OR for exploratory laparotomy, micro perforation discovered in colon near [facility] flexure, drains placed in left abdomen bulb and right abdomen bulb    -Advance to clear liquid diet today    -Monitor I/O   -Incentive spirometry    -Monitor oxygen demand -- supplemental oxygen via NC    2  C  difficile Diarrhea    -PCR positive, EIA negative   -Vancomycin through 2/3/21    3  Acute GI Bleeding    -EGD: "1  Possible Candida esophagitis 2  Moderate gastritis 3  Gastric polyps, likely fundic gland"              -Colonoscopy: "Multiple large, severe extensive diverticular containing stool with no bleeding in the transverse colon, descending colon, sigmoid colon and rectosigmoid  Small, internal hemorrhoids  "   -Continue PPI    -Hbg stable at 10 5    4  H/O Hernia Repair   -Hernia repair on 1/4/21 with Dr Rajinder Sharp    -Outpatient follow up     5  Hyperlipidemia    -Continue statin     VTE Pharmacologic Prophylaxis:   Pharmacologic: Pharmacologic VTE Prophylaxis contraindicated due to GI bleed  Mechanical VTE Prophylaxis in Place: Yes, sequential compression device     Patient Centered Rounds: I have performed bedside rounds with nursing staff today  Discussions with Specialists or Other Care Team Provider:     Education and Discussions with Family / Patient: patient     Time Spent for Care: 20 minutes  More than 50% of total time spent on counseling and coordination of care as described above      Current Length of Stay: 3 day(s)    Current Patient Status: Inpatient   Certification Statement: The patient will continue to require additional inpatient hospital stay due to monitoring of hemodynamics after exloratory lap    Discharge Plan: DC when cleared by surgery team    Code Status: Level 1 - Full Code      Subjective: The patient reports no issues over night  She is feeling tired today with some discomfort in her abdomen s/p exploratory lap yesterday  She denies N/V, fever, chills, SOB, chest pain  She is wondering when her drains can come out and when she can go home  Objective:     Vitals:   Temp (24hrs), Av 8 °F (36 6 °C), Min:96 9 °F (36 1 °C), Max:98 9 °F (37 2 °C)    Temp:  [96 9 °F (36 1 °C)-98 9 °F (37 2 °C)] 98 9 °F (37 2 °C)  HR:  [] 106  Resp:  [13-18] 18  BP: (136-163)/(64-90) 137/70  SpO2:  [93 %-99 %] 93 %  Body mass index is 26 33 kg/m²  Input and Output Summary (last 24 hours): Intake/Output Summary (Last 24 hours) at 2021 0908  Last data filed at 2021 0600  Gross per 24 hour   Intake 3955 42 ml   Output 475 ml   Net 3480 42 ml       Physical Exam:     Physical Exam  Constitutional:       General: She is not in acute distress  Appearance: She is not toxic-appearing  HENT:      Head: Normocephalic and atraumatic  Cardiovascular:      Rate and Rhythm: Normal rate and regular rhythm  Heart sounds: No murmur  No friction rub  No gallop  Pulmonary:      Breath sounds: No wheezing, rhonchi or rales  Abdominal:      Comments: Drains placed s/p exploratory lap   Musculoskeletal:      Right lower leg: No edema  Left lower leg: No edema  Skin:     General: Skin is warm  Findings: No bruising or erythema  Neurological:      General: No focal deficit present  Mental Status: She is alert           Additional Data:     Labs:    Results from last 7 days   Lab Units 21  0520   WBC Thousand/uL 14 22*   HEMOGLOBIN g/dL 10 5*   HEMATOCRIT % 32 2*   PLATELETS Thousands/uL 309   NEUTROS PCT % 93*   LYMPHS PCT % 3*   MONOS PCT % 3*   EOS PCT % 0     Results from last 7 days   Lab Units 21  0520   SODIUM mmol/L 140   POTASSIUM mmol/L 3 4*   CHLORIDE mmol/L 107   CO2 mmol/L 24   BUN mg/dL 7 CREATININE mg/dL 0 91   ANION GAP mmol/L 9   CALCIUM mg/dL 8 0*   ALBUMIN g/dL 2 6*   TOTAL BILIRUBIN mg/dL 0 40   ALK PHOS U/L 54   ALT U/L 32   AST U/L 21   GLUCOSE RANDOM mg/dL 160*     Results from last 7 days   Lab Units 01/19/21  1253   INR  0 96             Results from last 7 days   Lab Units 01/19/21  1253   LACTIC ACID mmol/L 1 7           * I Have Reviewed All Lab Data Listed Above  * Additional Pertinent Lab Tests Reviewed: Karla [facility] Admission Reviewed    Imaging:    Imaging Reports Reviewed Today Include:   Imaging Personally Reviewed by Myself Includes:      Recent Cultures (last 7 days):     Results from last 7 days   Lab Units 01/20/21  0001   C DIFF TOXIN B BY PCR  Positive*       Last 24 Hours Medication List:   Current Facility-Administered Medications   Medication Dose Route Frequency Provider Last Rate    acetaminophen  650 mg Oral Q8H Janae Aaron MD      cefazolin  2,000 mg Intravenous Daja Townsend MD Stopped (01/22/21 0247)    HYDROmorphone  1 mg Intravenous Q1H PRN Sammi Velazquez MD      lactated ringers  100 mL/hr Intravenous Continuous Gale DEDRICK Minaya PA-C 100 mL/hr (01/22/21 0853)    metroNIDAZOLE  500 mg Intravenous Daja Townsend MD Stopped (01/22/21 0438)    ondansetron  4 mg Intravenous Q6H PRN Sammi Velazquez MD      ondansetron  4 mg Intravenous Q6H PRN Sammi Velazquez MD      oxyCODONE  5 mg Oral Q4H PRN Sammi Velazquez MD      pantoprazole  40 mg Oral BID AC Sammi Velazquez MD      vancomycin  125 mg Oral Q6H Janae Aaron MD          Today, Patient Was Seen By: Kiki Rivers    ** Please Note: Dictation voice to text software may have been used in the creation of this document   **

## 2021-01-22 NOTE — ASSESSMENT & PLAN NOTE
· WBC 14  · Likely reactive from surgery micro perforation  · Repeat laboratory studies in the morning and monitor for any fever secondary to microperforation  No evidence to suggest peritonitis at this time

## 2021-01-22 NOTE — PLAN OF CARE
Problem: PHYSICAL THERAPY ADULT  Goal: Performs mobility at highest level of function for planned discharge setting  See evaluation for individualized goals  Description: Treatment/Interventions: Functional transfer training, LE strengthening/ROM, Elevations, Therapeutic exercise, Endurance training, Patient/family training, Equipment eval/education, Bed mobility, Gait training  Equipment Recommended: Walker(pending progress)       See flowsheet documentation for full assessment, interventions and recommendations  Note: Prognosis: Good  Problem List: Decreased strength, Decreased endurance, Impaired balance, Decreased mobility, Decreased safety awareness, Pain  Assessment: Pt is a 65 y o  female seen for PT evaluation s/p admit to [address] [place] [address] on 1/19/2021 w/ Pneumoperitoneum  Order placed for PT  Comorbidities affecting pt's physical performance at time of assessment listed above  Personal factors affecting pt at time of IE include: multi-level environment, inability to perform IADLs, inability to perform ADLs and inability to ambulate household distances  Prior to admission, pt was was independent w/ all functional mobility w/ out AD, ambulated unrestricted distances and all terrain, lived in multi-level home and lived with   Upon evaluation: Pt requires min A for bed mobility, min A for sit to stand, and min A for ambulation with RW  Limited ambulation due to fatigue  (Please find full objective findings from PT assessment regarding body systems outlined above)  Impairments and limitations also listed above, especially due to  weakness, impaired balance, decreased endurance, gait deviations, pain, decreased activity tolerance, decreased safety awareness and fall risk  The following objective measures performed on IE also reveal limitations: Barthel Index 55/100   Pt's clinical presentation is currently unstable/unpredictable seen in pt's presentation of decreased safety awareness, fall risk, significant pain with mobility, and decline in functional mobility compared to baseline  Pt to benefit from continued skilled PT tx while in hospital and upon DC to address deficits as defined above and maximize level of functional mobility  From PT/mobility standpoint, recommendation at time of d/c would be Home PT and home with family support pending progress in order to maximize pt's functional independence and consistency w/ mobility in order to facilitate return to PLOF  Recommend progression of ambulation and initiation of HEP as appropriate  PT Discharge Recommendation: Return to previous environment with social support, Other (Comment)(HHPT and family support pending progress)          See flowsheet documentation for full assessment

## 2021-01-22 NOTE — PROGRESS NOTES
[facility]  Progress Note - Doreen Dire 1950, 65 y o  female MRN: [id number]  Unit/Bed#: YO SEGAL 401-01 Encounter: [id number]  Primary Care Provider: Gloria Chance DO   Date and time admitted to hospital: 1/19/2021 12:21 PM    * Pneumoperitoneum  Assessment & Plan  · Secondary to microperforation  · Patient underwent EGD and colonoscopy on 1/20/2021  Subsequently she developed abdominal pain  · KUB was performed which was noted to show free air under the diaphragm bilaterally  · Patient was taken to the OR on 1/21/2021  There was no evidence of air leak with insufflation  Suspected to have micro perforation which had resolved  · Two MARÍA drains were placed and continue with minimal output at this time  · Diet advanced to clear liquid by General surgery this morning  · Continue monitoring outputs  Advance diet as per General surgery as able  · Continue IV Ancef and Flagyl secondary to microperforation  · Monitor WBC, this is currently elevated at 14  Remains afebrile  · Continue IV fluid hydration and pain control  C  difficile diarrhea  Assessment & Plan  · Noted to have PCR positive but EIA  · Given no prior history and evidence of diarrhea, started on vancomycin  · Continue without patient vancomycin for a total of 14 days  · Continue through to 2/3/21     History of GI diverticular bleed  Assessment & Plan   CT scan on admission with no acute findings to account for GI bleeding  No colitis or abscess noted  She has stable postsurgical findings   S/P EGD and colonoscopy 1/20 - likely self limited diverticular bleed  Old blood noted and poor prep      EGD with moderate gastritis and candida esophagitis  o Continue PPI   Hemoglobin stable and has not required any transfusions    Lab Results   Component Value Date    HGB 10 5 (L) 01/22/2021    HGB 10 6 (L) 01/21/2021    HGB 10 2 (L) 01/20/2021    HGB 10 7 (L) 01/20/2021       Leukocytosis  Assessment & Plan  · WBC 14  · Likely reactive from surgery micro perforation  · Repeat laboratory studies in the morning and monitor for any fever secondary to microperforation  No evidence to suggest peritonitis at this time  H/O hernia repair  Assessment & Plan  · History of hernia repair with Dr Nena Ceballos on 2021  · She was seen in her office day prior to admission and subsequently sent into the emergency department with more episodes of diarrhea  · Feel that her acute GI bleeding is not related to surgery, and merely coincidental   · CT revealing stable postsurgical findings  · Outpatient follow-up as directed with surgery team    Hyperlipidemia  Assessment & Plan  · Continue statin  VTE Pharmacologic Prophylaxis: VTE Score: 3 Moderate Risk (Score 3-4) - Pharmacological DVT Prophylaxis Ordered: Heparin  will be added    Patient Centered Rounds: I have performed bedside rounds with nursing staff today  Lia [facility]  Discussions with Specialists or Other Care Team Provider: GI    Education and Discussions with Family / Patient: Updated  () via phone  1216    Time Spent for Care: 30 minutes  More than 50% of total time spent on counseling and coordination of care as described above  Current Length of Stay: 3 day(s)  Current Patient Status: Inpatient   Certification Statement: The patient will continue to require additional inpatient hospital stay due to monitor post ex lap and drain placement  Discharge Plan: Anticipate discharge in 48 hrs to home  Code Status: Level 1 - Full Code    Subjective:   Feeling okay  No nausea or vomiting  No bowel movement yet  No flatus    Objective:     Vitals:   Temp (24hrs), Av 8 °F (36 6 °C), Min:96 9 °F (36 1 °C), Max:98 9 °F (37 2 °C)    Temp:  [96 9 °F (36 1 °C)-98 9 °F (37 2 °C)] 98 9 °F (37 2 °C)  HR:  [] 106  Resp:  [13-18] 18  BP: (136-163)/(64-90) 137/70  SpO2:  [93 %-99 %] 94 %  Body mass index is 26 33 kg/m²       Input and Output Summary (last 24 hours): Intake/Output Summary (Last 24 hours) at 1/22/2021 1217  Last data filed at 1/22/2021 1021  Gross per 24 hour   Intake 3255 42 ml   Output 575 ml   Net 2680 42 ml       Physical Exam:   Physical Exam  Vitals signs and nursing note reviewed  Constitutional:       General: She is not in acute distress  Appearance: Normal appearance  She is not diaphoretic  HENT:      Head: Normocephalic and atraumatic  Mouth/Throat:      Mouth: Mucous membranes are moist    Cardiovascular:      Rate and Rhythm: Normal rate and regular rhythm  Heart sounds: No murmur  Pulmonary:      Effort: Pulmonary effort is normal       Breath sounds: Normal breath sounds  No wheezing or rales  Comments: 93% RA  Abdominal:      General: Bowel sounds are normal       Palpations: Abdomen is soft  There is no mass  Tenderness: There is no abdominal tenderness  There is no guarding  Comments: 2 drains in place - right and left  serosangious drainage  abomen with normoactive bowel sounds, mild distention, slightly tender to palpation  Dressings intact   Musculoskeletal:      Right lower leg: No edema  Left lower leg: No edema  Skin:     General: Skin is warm and dry  Neurological:      Mental Status: She is alert  Mental status is at baseline     Psychiatric:         Mood and Affect: Mood normal          Behavior: Behavior normal         Additional Data:    Labs:  Results from last 7 days   Lab Units 01/22/21  0520   WBC Thousand/uL 14 22*   HEMOGLOBIN g/dL 10 5*   HEMATOCRIT % 32 2*   PLATELETS Thousands/uL 309   NEUTROS PCT % 93*   LYMPHS PCT % 3*   MONOS PCT % 3*   EOS PCT % 0     Results from last 7 days   Lab Units 01/22/21  0520   SODIUM mmol/L 140   POTASSIUM mmol/L 3 4*   CHLORIDE mmol/L 107   CO2 mmol/L 24   BUN mg/dL 7   CREATININE mg/dL 0 91   ANION GAP mmol/L 9   CALCIUM mg/dL 8 0*   ALBUMIN g/dL 2 6*   TOTAL BILIRUBIN mg/dL 0 40   ALK PHOS U/L 54   ALT U/L 32   AST U/L 21   GLUCOSE RANDOM mg/dL 160*     Results from last 7 days   Lab Units 01/19/21  1253   INR  0 96             Results from last 7 days   Lab Units 01/19/21  1253   LACTIC ACID mmol/L 1 7       Lines/Drains:  Invasive Devices     Peripheral Intravenous Line            Peripheral IV 01/19/21 Left Antecubital 2 days    Peripheral IV 01/21/21 Right Wrist less than 1 day          Drain            Closed/Suction Drain Left Abdomen Bulb less than 1 day    Closed/Suction Drain Right Abdomen Bulb less than 1 day                Telemetry:        Imaging: Reviewed radiology reports from this admission including: procedure reports    Recent Cultures (last 7 days):   Results from last 7 days   Lab Units 01/20/21  0001   C DIFF TOXIN B BY PCR  Positive*       Last 24 Hours Medication List:   Current Facility-Administered Medications   Medication Dose Route Frequency Provider Last Rate    acetaminophen  650 mg Oral Q8H Jese Bartlett MD      cefazolin  2,000 mg Intravenous Q8H Kyle Russ MD 2,000 mg (01/22/21 1054)    dextrose 5 % and sodium chloride 0 45 % with KCl 20 mEq/L  75 mL/hr Intravenous Continuous Galebo Minaya PA-C 75 mL/hr (01/22/21 1134)    heparin (porcine)  5,000 Units Subcutaneous Q8H [address] Ninfa Negrete PA-C      HYDROmorphone  1 mg Intravenous Q1H PRN Kyle Russ MD      metroNIDAZOLE  500 mg Intravenous Q8H Kyle Russ  mg (01/22/21 1130)    ondansetron  4 mg Intravenous Q6H PRN Kyle Russ MD      ondansetron  4 mg Intravenous Q6H PRN Kyle Russ MD      oxyCODONE  5 mg Oral Q4H PRN Kyle Russ MD      pantoprazole  40 mg Oral BID AC Kyle Russ MD      vancomycin  125 mg Oral Q6H Jese Bartlett MD          Today, Patient Was Seen By: Ricky Boss PA-C    ** Please Note: Dictation voice to text software may have been used in the creation of this document   **

## 2021-01-22 NOTE — ASSESSMENT & PLAN NOTE
· History of hernia repair with Dr Hector Vazquez on 1/4/2021  · She was seen in her office day prior to admission and subsequently sent into the emergency department with more episodes of diarrhea  · Feel that her acute GI bleeding is not related to surgery, and merely coincidental   · CT revealing stable postsurgical findings    · Outpatient follow-up as directed with surgery team

## 2021-01-22 NOTE — PROGRESS NOTES
Progress Note - General Surgery   Leon Cherelle 65 y o  female MRN: [id number]  Unit/Bed#: W -01 Encounter: [id number]    Assessment:  65 F presenting with GI bleed s/p endoscopy on 1/20 w/ subsequent findings of pneumoperitoneum  S/p ex lap with drain placement on 1/21  MARÍA L: 20 cc SS  MARÍA R: 55cc SS    Plan:  NPO--hopeful advancement to clears  IVF--> MIVF  Maintain JPs  I/Os  -Record UOP and MARÍA output  D/c Bragg  DVT ppx  Pain/Nausea Control  OOB as tolerated  Incentive Spirometry  PT/OT    Subjective/Objective     Subjective:   No acute events overnight  Pain well controlled  No N/V  No Bowel function    Objective:    Blood pressure 137/70, pulse (!) 106, temperature 98 9 °F (37 2 °C), resp  rate 18, height 5' 6" (1 676 m), weight 74 kg (163 lb 2 3 oz), SpO2 93 %  ,Body mass index is 26 33 kg/m²  Intake/Output Summary (Last 24 hours) at 1/22/2021 9823  Last data filed at 1/22/2021 0600  Gross per 24 hour   Intake 4195 42 ml   Output 475 ml   Net 3720 42 ml       Invasive Devices     Peripheral Intravenous Line            Peripheral IV 01/19/21 Left Antecubital 2 days    Peripheral IV 01/21/21 Right Wrist less than 1 day          Drain            Closed/Suction Drain Left Abdomen Bulb less than 1 day    Closed/Suction Drain Right Abdomen Bulb less than 1 day    Urethral Catheter Latex 16 Fr  less than 1 day                Physical Exam:   Gen:  NAD  HEENT: NCAT  MMM  CV: well perfused, pulses palpable  Lungs: Normal respiratory effort  Abd: soft, appropriately tender  Incisions cdi drains intact  Skin: warm/ dry  Extremities: no peripheral edema, no clubbing or cyanosis  Neuro:  AxO x3      Results from last 7 days   Lab Units 01/22/21  0520 01/21/21  0436 01/20/21  1110 01/20/21  0447   WBC Thousand/uL 14 22* 10 48*  --  8 85   HEMOGLOBIN g/dL 10 5* 10 6* 10 2* 10 7*   HEMATOCRIT % 32 2* 33 2* 32 0* 34 1*   PLATELETS Thousands/uL 309 329  --  320     Results from last 7 days   Lab Units 01/22/21  0520 01/21/21  0436 01/20/21  0447   POTASSIUM mmol/L 3 4* 3 5 3 7   CHLORIDE mmol/L 107 108 109*   CO2 mmol/L 24 27 27   BUN mg/dL 7 7 13   CREATININE mg/dL 0 91 0 89 0 82   CALCIUM mg/dL 8 0* 8 4 8 2*     Results from last 7 days   Lab Units 01/19/21  1253   INR  0 96   PTT seconds 25        I have personally reviewed pertinent films in PACS      Medications:   Scheduled Meds:  Current Facility-Administered Medications   Medication Dose Route Frequency Provider Last Rate    acetaminophen  650 mg Oral Q8H Kelly Sparks MD      cefazolin  2,000 mg Intravenous Amber Nuñez MD Stopped (01/22/21 0247)    HYDROmorphone  1 mg Intravenous Q1H PRN Lewis Sagastume MD      lactated ringers  125 mL/hr Intravenous Continuous Lewis Sagastume  mL/hr (01/21/21 2119)    metroNIDAZOLE  500 mg Intravenous Amber Nuñez MD Stopped (01/22/21 0438)    ondansetron  4 mg Intravenous Q6H PRN Lewis Sagastume MD      ondansetron  4 mg Intravenous Q6H PRN Lewis Sagastume MD      oxyCODONE  5 mg Oral Q4H PRN Lewis Sagastume MD      pantoprazole  40 mg Oral BID AC Lewis Sagastume MD      vancomycin  125 mg Oral Q6H [address] Lewis Sagastume MD       Continuous Infusions:lactated ringers, 125 mL/hr, Last Rate: 125 mL/hr (01/21/21 2119)      PRN Meds:  HYDROmorphone, 1 mg, Q1H PRN  ondansetron, 4 mg, Q6H PRN  ondansetron, 4 mg, Q6H PRN  oxyCODONE, 5 mg, Q4H PRN      VTE Pharmacologic Prophylaxis: held per primary  VTE Mechanical Prophylaxis: sequential compression device

## 2021-01-23 LAB
ALBUMIN SERPL BCP-MCNC: 2.4 G/DL (ref 3.5–5)
ALP SERPL-CCNC: 48 U/L (ref 46–116)
ALT SERPL W P-5'-P-CCNC: 24 U/L (ref 12–78)
ANION GAP SERPL CALCULATED.3IONS-SCNC: 10 MMOL/L (ref 4–13)
AST SERPL W P-5'-P-CCNC: 19 U/L (ref 5–45)
BASOPHILS # BLD AUTO: 0.01 THOUSANDS/ΜL (ref 0–0.1)
BASOPHILS NFR BLD AUTO: 0 % (ref 0–1)
BILIRUB SERPL-MCNC: 0.33 MG/DL (ref 0.2–1)
BUN SERPL-MCNC: 9 MG/DL (ref 5–25)
CALCIUM ALBUM COR SERPL-MCNC: 9.5 MG/DL (ref 8.3–10.1)
CALCIUM SERPL-MCNC: 8.2 MG/DL (ref 8.3–10.1)
CHLORIDE SERPL-SCNC: 106 MMOL/L (ref 100–108)
CO2 SERPL-SCNC: 24 MMOL/L (ref 21–32)
CREAT SERPL-MCNC: 0.99 MG/DL (ref 0.6–1.3)
EOSINOPHIL # BLD AUTO: 0.03 THOUSAND/ΜL (ref 0–0.61)
EOSINOPHIL NFR BLD AUTO: 0 % (ref 0–6)
ERYTHROCYTE [DISTWIDTH] IN BLOOD BY AUTOMATED COUNT: 14.4 % (ref 11.6–15.1)
GFR SERPL CREATININE-BSD FRML MDRD: 57 ML/MIN/1.73SQ M
GLUCOSE SERPL-MCNC: 130 MG/DL (ref 65–140)
HCT VFR BLD AUTO: 30.6 % (ref 34.8–46.1)
HGB BLD-MCNC: 9.9 G/DL (ref 11.5–15.4)
IMM GRANULOCYTES # BLD AUTO: 0.05 THOUSAND/UL (ref 0–0.2)
IMM GRANULOCYTES NFR BLD AUTO: 0 % (ref 0–2)
LYMPHOCYTES # BLD AUTO: 1.19 THOUSANDS/ΜL (ref 0.6–4.47)
LYMPHOCYTES NFR BLD AUTO: 10 % (ref 14–44)
MAGNESIUM SERPL-MCNC: 1.8 MG/DL (ref 1.6–2.6)
MCH RBC QN AUTO: 30.3 PG (ref 26.8–34.3)
MCHC RBC AUTO-ENTMCNC: 32.4 G/DL (ref 31.4–37.4)
MCV RBC AUTO: 94 FL (ref 82–98)
MONOCYTES # BLD AUTO: 0.61 THOUSAND/ΜL (ref 0.17–1.22)
MONOCYTES NFR BLD AUTO: 5 % (ref 4–12)
NEUTROPHILS # BLD AUTO: 10.2 THOUSANDS/ΜL (ref 1.85–7.62)
NEUTS SEG NFR BLD AUTO: 85 % (ref 43–75)
NRBC BLD AUTO-RTO: 0 /100 WBCS
PLATELET # BLD AUTO: 327 THOUSANDS/UL (ref 149–390)
PMV BLD AUTO: 9.3 FL (ref 8.9–12.7)
POTASSIUM SERPL-SCNC: 3.3 MMOL/L (ref 3.5–5.3)
PROT SERPL-MCNC: 5.6 G/DL (ref 6.4–8.2)
RBC # BLD AUTO: 3.27 MILLION/UL (ref 3.81–5.12)
SODIUM SERPL-SCNC: 140 MMOL/L (ref 136–145)
WBC # BLD AUTO: 12.09 THOUSAND/UL (ref 4.31–10.16)

## 2021-01-23 PROCEDURE — 99232 SBSQ HOSP IP/OBS MODERATE 35: CPT | Performed by: PHYSICIAN ASSISTANT

## 2021-01-23 PROCEDURE — 85025 COMPLETE CBC W/AUTO DIFF WBC: CPT | Performed by: PHYSICIAN ASSISTANT

## 2021-01-23 PROCEDURE — 99024 POSTOP FOLLOW-UP VISIT: CPT | Performed by: SURGERY

## 2021-01-23 PROCEDURE — 80053 COMPREHEN METABOLIC PANEL: CPT | Performed by: PHYSICIAN ASSISTANT

## 2021-01-23 PROCEDURE — 83735 ASSAY OF MAGNESIUM: CPT | Performed by: PHYSICIAN ASSISTANT

## 2021-01-23 RX ORDER — HYDROMORPHONE HCL/PF 1 MG/ML
0.5 SYRINGE (ML) INJECTION
Status: DISCONTINUED | OUTPATIENT
Start: 2021-01-23 | End: 2021-01-27 | Stop reason: HOSPADM

## 2021-01-23 RX ORDER — SIMETHICONE 80 MG
80 TABLET,CHEWABLE ORAL EVERY 6 HOURS PRN
Status: DISCONTINUED | OUTPATIENT
Start: 2021-01-23 | End: 2021-01-27 | Stop reason: HOSPADM

## 2021-01-23 RX ORDER — OXYCODONE HYDROCHLORIDE 5 MG/1
2.5 TABLET ORAL EVERY 4 HOURS PRN
Status: DISCONTINUED | OUTPATIENT
Start: 2021-01-23 | End: 2021-01-24

## 2021-01-23 RX ORDER — ACETAMINOPHEN 325 MG/1
650 TABLET ORAL EVERY 6 HOURS PRN
Status: DISCONTINUED | OUTPATIENT
Start: 2021-01-23 | End: 2021-01-27 | Stop reason: HOSPADM

## 2021-01-23 RX ORDER — SIMETHICONE 80 MG
80 TABLET,CHEWABLE ORAL EVERY 6 HOURS PRN
Status: DISCONTINUED | OUTPATIENT
Start: 2021-01-23 | End: 2021-01-23

## 2021-01-23 RX ORDER — POTASSIUM CHLORIDE 20 MEQ/1
20 TABLET, EXTENDED RELEASE ORAL ONCE
Status: COMPLETED | OUTPATIENT
Start: 2021-01-23 | End: 2021-01-23

## 2021-01-23 RX ORDER — OXYCODONE HYDROCHLORIDE 5 MG/1
5 TABLET ORAL EVERY 4 HOURS PRN
Status: DISCONTINUED | OUTPATIENT
Start: 2021-01-23 | End: 2021-01-24

## 2021-01-23 RX ADMIN — Medication 125 MG: at 17:49

## 2021-01-23 RX ADMIN — Medication 125 MG: at 00:22

## 2021-01-23 RX ADMIN — OXYCODONE HYDROCHLORIDE 5 MG: 5 TABLET ORAL at 17:49

## 2021-01-23 RX ADMIN — Medication 125 MG: at 07:01

## 2021-01-23 RX ADMIN — POTASSIUM CHLORIDE 20 MEQ: 1500 TABLET, EXTENDED RELEASE ORAL at 13:00

## 2021-01-23 RX ADMIN — Medication 125 MG: at 11:23

## 2021-01-23 RX ADMIN — HEPARIN SODIUM 5000 UNITS: 5000 INJECTION INTRAVENOUS; SUBCUTANEOUS at 06:54

## 2021-01-23 RX ADMIN — OXYCODONE HYDROCHLORIDE 5 MG: 5 TABLET ORAL at 00:16

## 2021-01-23 RX ADMIN — ACETAMINOPHEN 650 MG: 325 TABLET, FILM COATED ORAL at 06:54

## 2021-01-23 RX ADMIN — DEXTROSE, SODIUM CHLORIDE, AND POTASSIUM CHLORIDE 75 ML/HR: 5; .45; .15 INJECTION INTRAVENOUS at 09:09

## 2021-01-23 RX ADMIN — METRONIDAZOLE 500 MG: 500 INJECTION, SOLUTION INTRAVENOUS at 03:07

## 2021-01-23 RX ADMIN — HEPARIN SODIUM 5000 UNITS: 5000 INJECTION INTRAVENOUS; SUBCUTANEOUS at 21:41

## 2021-01-23 RX ADMIN — METRONIDAZOLE 500 MG: 500 INJECTION, SOLUTION INTRAVENOUS at 18:54

## 2021-01-23 RX ADMIN — PANTOPRAZOLE SODIUM 40 MG: 40 TABLET, DELAYED RELEASE ORAL at 17:49

## 2021-01-23 RX ADMIN — CEFAZOLIN SODIUM 2000 MG: 2 SOLUTION INTRAVENOUS at 11:21

## 2021-01-23 RX ADMIN — Medication 125 MG: at 23:32

## 2021-01-23 RX ADMIN — CEFAZOLIN SODIUM 2000 MG: 2 SOLUTION INTRAVENOUS at 02:42

## 2021-01-23 RX ADMIN — HEPARIN SODIUM 5000 UNITS: 5000 INJECTION INTRAVENOUS; SUBCUTANEOUS at 14:51

## 2021-01-23 RX ADMIN — PANTOPRAZOLE SODIUM 40 MG: 40 TABLET, DELAYED RELEASE ORAL at 06:54

## 2021-01-23 RX ADMIN — METRONIDAZOLE 500 MG: 500 INJECTION, SOLUTION INTRAVENOUS at 12:14

## 2021-01-23 RX ADMIN — CEFAZOLIN SODIUM 2000 MG: 2 SOLUTION INTRAVENOUS at 17:49

## 2021-01-23 NOTE — ASSESSMENT & PLAN NOTE
· History of hernia repair with Dr Augie Godoy on 1/4/2021  · She was seen in her office day prior to admission and subsequently sent into the emergency department with more episodes of diarrhea  · Feel that her acute GI bleeding is not related to surgery, and merely coincidental   · CT revealing stable postsurgical findings    · Outpatient follow-up as directed with surgery team

## 2021-01-23 NOTE — ASSESSMENT & PLAN NOTE
· WBC 14 POD1 and now to 12 on POD2  · Likely reactive from surgery and micro perforation  · No findings to suggest peritonitis    Patient will be continued on IV antibiotics prophylactically

## 2021-01-23 NOTE — ASSESSMENT & PLAN NOTE
 CT scan on admission with no acute findings to account for GI bleeding  No colitis or abscess noted  She has stable postsurgical findings   S/P EGD and colonoscopy 1/20 - likely self limited diverticular bleed  Old blood noted and poor prep      EGD with moderate gastritis and candida esophagitis  o Continue PPI   Hemoglobin stable and has not required any transfusions     Lab Results   Component Value Date    HGB 9 9 (L) 01/23/2021    HGB 10 5 (L) 01/22/2021    HGB 10 6 (L) 01/21/2021    HGB 10 2 (L) 01/20/2021

## 2021-01-23 NOTE — PROGRESS NOTES
[facility] Internal Medicine  Progress Note - Angeli Hernández 1950, 65 y o  female MRN: [id number]  Unit/Bed#: YO -01 Encounter: [id number]  Primary Care Provider: Vanessa Ellison DO   Date and time admitted to hospital: 1/19/2021 12:21 PM    * Pneumoperitoneum  Assessment & Plan  · Secondary to microperforation  · Patient underwent EGD and colonoscopy on 1/20/2021  Subsequently she developed abdominal pain  · KUB was performed which was noted to show free air under the diaphragm bilaterally  · Patient was taken to the OR on 1/21/2021  There was no evidence of air leak with insufflation  Suspected to have micro perforation which had resolved  · Two MARÍA drains were placed and continue with minimal output at this time  · Continue clear liquid diet  · Continue monitoring outputs  Advance diet as per General surgery as able  · Continue IV Ancef and Flagyl secondary to microperforation  · Leukocytosis improving    C  difficile diarrhea  Assessment & Plan  · Noted to have PCR positive but EIA  · Given no prior history and evidence of diarrhea, started on vancomycin  · Continue without patient vancomycin for a total of 14 days  · Continue through to 2/3/21     History of GI diverticular bleed  Assessment & Plan   CT scan on admission with no acute findings to account for GI bleeding  No colitis or abscess noted  She has stable postsurgical findings   S/P EGD and colonoscopy 1/20 - likely self limited diverticular bleed  Old blood noted and poor prep   EGD with moderate gastritis and candida esophagitis  o Continue PPI   Hemoglobin stable and has not required any transfusions     Lab Results   Component Value Date    HGB 9 9 (L) 01/23/2021    HGB 10 5 (L) 01/22/2021    HGB 10 6 (L) 01/21/2021    HGB 10 2 (L) 01/20/2021       Leukocytosis  Assessment & Plan  · WBC 14 POD1 and now to 12 on POD2  · Likely reactive from surgery and micro perforation  · No findings to suggest peritonitis    Patient will be continued on IV antibiotics prophylactically    H/O hernia repair  Assessment & Plan  · History of hernia repair with Dr Anthony Haider on 2021  · She was seen in her office day prior to admission and subsequently sent into the emergency department with more episodes of diarrhea  · Feel that her acute GI bleeding is not related to surgery, and merely coincidental   · CT revealing stable postsurgical findings  · Outpatient follow-up as directed with surgery team    Hyperlipidemia  Assessment & Plan  · Continue statin  VTE Pharmacologic Prophylaxis: VTE Score: 3 Moderate Risk (Score 3-4) - Pharmacological DVT Prophylaxis Ordered: Heparin  Patient Centered Rounds: I have performed bedside rounds with nursing staff today  Sarah Grief  Discussions with Specialists or Other Care Team Provider: nursing    Education and Discussions with Family / Patient: Updated  () via phone  1200    Time Spent for Care: 30 minutes  More than 50% of total time spent on counseling and coordination of care as described above  Current Length of Stay: 4 day(s)  Current Patient Status: Inpatient   Certification Statement: The patient will continue to require additional inpatient hospital stay due to Pneumoperitoneum, advancing diet, IV antibiotics, monitor for stool output  Discharge Plan: Anticipate discharge in 48 hrs to home  Code Status: Level 1 - Full Code    Subjective:   No nausea or vomiting  Burping  No flatus or BM  Pain with movement  Less pain at rest  Minimal oral intake (25% of breakfast)    Objective:     Vitals:   Temp (24hrs), Av 5 °F (36 9 °C), Min:97 8 °F (36 6 °C), Max:99 7 °F (37 6 °C)    Temp:  [97 8 °F (36 6 °C)-99 7 °F (37 6 °C)] 97 8 °F (36 6 °C)  HR:  [] 80  Resp:  [16-18] 18  BP: (129-139)/(72-82) 129/72  SpO2:  [87 %-95 %] 94 %  Body mass index is 26 33 kg/m²  Input and Output Summary (last 24 hours):      Intake/Output Summary (Last 24 hours) at 2021 1220  Last data filed at 1/23/2021 1000  Gross per 24 hour   Intake 990 ml   Output 440 ml   Net 550 ml       Physical Exam:   Physical Exam  Vitals signs and nursing note reviewed  Constitutional:       General: She is not in acute distress  Appearance: Normal appearance  She is ill-appearing  She is not diaphoretic  HENT:      Head: Normocephalic and atraumatic  Mouth/Throat:      Mouth: Mucous membranes are moist    Cardiovascular:      Rate and Rhythm: Normal rate and regular rhythm  Heart sounds: No murmur  Pulmonary:      Effort: Pulmonary effort is normal       Breath sounds: Normal breath sounds  No wheezing or rales  Abdominal:      General: Bowel sounds are normal       Palpations: There is no mass  Tenderness: There is no abdominal tenderness  There is no guarding  Comments: Hypoactive BS noted  Dressing intact  2 MARÍA drains with serosangious drainage   Musculoskeletal:      Right lower leg: No edema  Left lower leg: No edema  Skin:     General: Skin is warm and dry  Neurological:      Mental Status: She is alert     Psychiatric:         Mood and Affect: Mood normal          Behavior: Behavior normal           Additional Data:     Labs:  Results from last 7 days   Lab Units 01/23/21  0438   WBC Thousand/uL 12 09*   HEMOGLOBIN g/dL 9 9*   HEMATOCRIT % 30 6*   PLATELETS Thousands/uL 327   NEUTROS PCT % 85*   LYMPHS PCT % 10*   MONOS PCT % 5   EOS PCT % 0     Results from last 7 days   Lab Units 01/23/21  0438   SODIUM mmol/L 140   POTASSIUM mmol/L 3 3*   CHLORIDE mmol/L 106   CO2 mmol/L 24   BUN mg/dL 9   CREATININE mg/dL 0 99   ANION GAP mmol/L 10   CALCIUM mg/dL 8 2*   ALBUMIN g/dL 2 4*   TOTAL BILIRUBIN mg/dL 0 33   ALK PHOS U/L 48   ALT U/L 24   AST U/L 19   GLUCOSE RANDOM mg/dL 130     Results from last 7 days   Lab Units 01/19/21  1253   INR  0 96             Results from last 7 days   Lab Units 01/19/21  1253   LACTIC ACID mmol/L 1 7       Lines/Drains:  Invasive Devices Peripheral Intravenous Line            Peripheral IV 01/19/21 Left Antecubital 3 days    Peripheral IV 01/21/21 Right Wrist 1 day          Drain            Closed/Suction Drain Left Abdomen Bulb 1 day    Closed/Suction Drain Right Abdomen Bulb 1 day                Telemetry:        Imaging: No pertinent imaging reviewed  Recent Cultures (last 7 days):   Results from last 7 days   Lab Units 01/20/21  0001   C DIFF TOXIN B BY PCR  Positive*       Last 24 Hours Medication List:   Current Facility-Administered Medications   Medication Dose Route Frequency Provider Last Rate    acetaminophen  650 mg Oral Q6H PRN Dinesh Quinones PA-C      cefazolin  2,000 mg Intravenous Q8H Megan Bhagat MD 2,000 mg (01/23/21 1121)    dextrose 5 % and sodium chloride 0 45 % with KCl 20 mEq/L  75 mL/hr Intravenous Continuous Gale DEDRICK Minaya PA-C 75 mL/hr (01/23/21 0909)    heparin (porcine)  5,000 Units Subcutaneous Q8H [address] Ninfa Negrete PA-C      HYDROmorphone  0 5 mg Intravenous Q3H PRN Ninfa Negrete PA-C      metroNIDAZOLE  500 mg Intravenous Q8H Megan Bhagat  mg (01/23/21 1214)    ondansetron  4 mg Intravenous Q6H PRN Megan Bhagat MD      oxyCODONE  2 5 mg Oral Q4H PRN Ninfa Negrete PA-C      oxyCODONE  5 mg Oral Q4H PRN Ninfa Negrete PA-C      pantoprazole  40 mg Oral BID AC Megan Bhagat MD      potassium chloride  20 mEq Oral Once [facility] SELVIN Negrete      vancomycin  125 mg Oral Q6H Júnior Lopez MD          Today, Patient Was Seen By: Dinesh Quinones PA-C    ** Please Note: Dictation voice to text software may have been used in the creation of this document   **

## 2021-01-23 NOTE — ASSESSMENT & PLAN NOTE
· Secondary to microperforation  · Patient underwent EGD and colonoscopy on 1/20/2021  Subsequently she developed abdominal pain  · KUB was performed which was noted to show free air under the diaphragm bilaterally  · Patient was taken to the OR on 1/21/2021  There was no evidence of air leak with insufflation  Suspected to have micro perforation which had resolved  · Two MARÍA drains were placed and continue with minimal output at this time  · Continue clear liquid diet  · Continue monitoring outputs  Advance diet as per General surgery as able  · Continue IV Ancef and Flagyl secondary to microperforation    · Leukocytosis improving

## 2021-01-23 NOTE — ASSESSMENT & PLAN NOTE
· Noted to have PCR positive but EIA  · Given no prior history and evidence of diarrhea, started on vancomycin  · Continue without patient vancomycin for a total of 14 days     · Continue through to 2/3/21

## 2021-01-23 NOTE — PLAN OF CARE
Problem: PAIN - ADULT  Goal: Verbalizes/displays adequate comfort level or baseline comfort level  Description: Interventions:  - Encourage patient to monitor pain and request assistance  - Assess pain using appropriate pain scale  - Administer analgesics based on type and severity of pain and evaluate response  - Implement non-pharmacological measures as appropriate and evaluate response  - Consider cultural and social influences on pain and pain management  - Notify physician/advanced practitioner if interventions unsuccessful or patient reports new pain  Outcome: Progressing     Problem: DISCHARGE PLANNING  Goal: Discharge to home or other facility with appropriate resources  Description: INTERVENTIONS:  - Identify barriers to discharge w/patient and caregiver  - Arrange for needed discharge resources and transportation as appropriate  - Identify discharge learning needs (meds, wound care, etc )  - Arrange for interpretive services to assist at discharge as needed  - Refer to Case Management Department for coordinating discharge planning if the patient needs post-hospital services based on physician/advanced practitioner order or complex needs related to functional status, cognitive ability, or social support system  Outcome: Progressing     Problem: Knowledge Deficit  Goal: Patient/family/caregiver demonstrates understanding of disease process, treatment plan, medications, and discharge instructions  Description: Complete learning assessment and assess knowledge base    Interventions:  - Provide teaching at level of understanding  - Provide teaching via preferred learning methods  Outcome: Progressing     Problem: GASTROINTESTINAL - ADULT  Goal: Maintains or returns to baseline bowel function  Description: INTERVENTIONS:  - Assess bowel function  - Encourage oral fluids to ensure adequate hydration  - Administer IV fluids if ordered to ensure adequate hydration  - Administer ordered medications as needed  - Encourage mobilization and activity  - Consider nutritional services referral to assist patient with adequate nutrition and appropriate food choices  Outcome: Progressing  Goal: Maintains adequate nutritional intake  Description: INTERVENTIONS:  - Monitor percentage of each meal consumed  - Identify factors contributing to decreased intake, treat as appropriate  - Assist with meals as needed  - Monitor I&O, weight, and lab values if indicated  - Obtain nutrition services referral as needed  Outcome: Progressing     Problem: Prexisting or High Potential for Compromised Skin Integrity  Goal: Skin integrity is maintained or improved  Description: INTERVENTIONS:  - Identify patients at risk for skin breakdown  - Assess and monitor skin integrity  - Assess and monitor nutrition and hydration status  - Monitor labs   - Assess for incontinence   - Turn and reposition patient  - Assist with mobility/ambulation  - Relieve pressure over bony prominences  - Avoid friction and shearing  - Provide appropriate hygiene as needed including keeping skin clean and dry  - Evaluate need for skin moisturizer/barrier cream  - Collaborate with interdisciplinary team   - Patient/family teaching  - Consider wound care consult   Outcome: Progressing     Problem: Nutrition/Hydration-ADULT  Goal: Nutrient/Hydration intake appropriate for improving, restoring or maintaining nutritional needs  Description: Monitor and assess patient's nutrition/hydration status for malnutrition  Collaborate with interdisciplinary team and initiate plan and interventions as ordered  Monitor patient's weight and dietary intake as ordered or per policy  Utilize nutrition screening tool and intervene as necessary  Determine patient's food preferences and provide high-protein, high-caloric foods as appropriate       INTERVENTIONS:  - Monitor oral intake, urinary output, labs, and treatment plans  - Assess nutrition and hydration status and recommend course of action  - Evaluate amount of meals eaten  - Assist patient with eating if necessary   - Allow adequate time for meals  - Recommend/ encourage appropriate diets, oral nutritional supplements, and vitamin/mineral supplements  - Order, calculate, and assess calorie counts as needed  - Recommend, monitor, and adjust tube feedings and TPN/PPN based on assessed needs  - Assess need for intravenous fluids  - Provide specific nutrition/hydration education as appropriate  - Include patient/family/caregiver in decisions related to nutrition  Outcome: Progressing

## 2021-01-24 LAB
ALBUMIN SERPL BCP-MCNC: 2.5 G/DL (ref 3.5–5)
ALP SERPL-CCNC: 56 U/L (ref 46–116)
ALT SERPL W P-5'-P-CCNC: 13 U/L (ref 12–78)
ANION GAP SERPL CALCULATED.3IONS-SCNC: 7 MMOL/L (ref 4–13)
ANION GAP SERPL CALCULATED.3IONS-SCNC: 9 MMOL/L (ref 4–13)
APTT PPP: 32 SECONDS (ref 23–37)
AST SERPL W P-5'-P-CCNC: 15 U/L (ref 5–45)
BILIRUB SERPL-MCNC: 0.31 MG/DL (ref 0.2–1)
BUN SERPL-MCNC: 6 MG/DL (ref 5–25)
BUN SERPL-MCNC: 7 MG/DL (ref 5–25)
CALCIUM ALBUM COR SERPL-MCNC: 9.7 MG/DL (ref 8.3–10.1)
CALCIUM SERPL-MCNC: 8.1 MG/DL (ref 8.3–10.1)
CALCIUM SERPL-MCNC: 8.5 MG/DL (ref 8.3–10.1)
CHLORIDE SERPL-SCNC: 106 MMOL/L (ref 100–108)
CHLORIDE SERPL-SCNC: 106 MMOL/L (ref 100–108)
CO2 SERPL-SCNC: 23 MMOL/L (ref 21–32)
CO2 SERPL-SCNC: 25 MMOL/L (ref 21–32)
CREAT SERPL-MCNC: 0.84 MG/DL (ref 0.6–1.3)
CREAT SERPL-MCNC: 0.84 MG/DL (ref 0.6–1.3)
ERYTHROCYTE [DISTWIDTH] IN BLOOD BY AUTOMATED COUNT: 14.5 % (ref 11.6–15.1)
GFR SERPL CREATININE-BSD FRML MDRD: 70 ML/MIN/1.73SQ M
GFR SERPL CREATININE-BSD FRML MDRD: 70 ML/MIN/1.73SQ M
GLUCOSE SERPL-MCNC: 116 MG/DL (ref 65–140)
GLUCOSE SERPL-MCNC: 124 MG/DL (ref 65–140)
HCT VFR BLD AUTO: 29.5 % (ref 34.8–46.1)
HCT VFR BLD AUTO: 30.9 % (ref 34.8–46.1)
HGB BLD-MCNC: 9.4 G/DL (ref 11.5–15.4)
HGB BLD-MCNC: 9.9 G/DL (ref 11.5–15.4)
INR PPP: 1.01 (ref 0.84–1.19)
LACTATE SERPL-SCNC: 1.5 MMOL/L (ref 0.5–2)
MCH RBC QN AUTO: 30.4 PG (ref 26.8–34.3)
MCHC RBC AUTO-ENTMCNC: 31.9 G/DL (ref 31.4–37.4)
MCV RBC AUTO: 96 FL (ref 82–98)
PLATELET # BLD AUTO: 323 THOUSANDS/UL (ref 149–390)
PMV BLD AUTO: 9.1 FL (ref 8.9–12.7)
POTASSIUM SERPL-SCNC: 3.4 MMOL/L (ref 3.5–5.3)
POTASSIUM SERPL-SCNC: 3.7 MMOL/L (ref 3.5–5.3)
PROT SERPL-MCNC: 5.8 G/DL (ref 6.4–8.2)
PROTHROMBIN TIME: 13.4 SECONDS (ref 11.6–14.5)
RBC # BLD AUTO: 3.09 MILLION/UL (ref 3.81–5.12)
SODIUM SERPL-SCNC: 138 MMOL/L (ref 136–145)
SODIUM SERPL-SCNC: 138 MMOL/L (ref 136–145)
WBC # BLD AUTO: 9.14 THOUSAND/UL (ref 4.31–10.16)

## 2021-01-24 PROCEDURE — 85018 HEMOGLOBIN: CPT | Performed by: PHYSICIAN ASSISTANT

## 2021-01-24 PROCEDURE — 99232 SBSQ HOSP IP/OBS MODERATE 35: CPT | Performed by: PHYSICIAN ASSISTANT

## 2021-01-24 PROCEDURE — 80048 BASIC METABOLIC PNL TOTAL CA: CPT | Performed by: SURGERY

## 2021-01-24 PROCEDURE — 83605 ASSAY OF LACTIC ACID: CPT | Performed by: PHYSICIAN ASSISTANT

## 2021-01-24 PROCEDURE — 85610 PROTHROMBIN TIME: CPT | Performed by: PHYSICIAN ASSISTANT

## 2021-01-24 PROCEDURE — 85027 COMPLETE CBC AUTOMATED: CPT | Performed by: SURGERY

## 2021-01-24 PROCEDURE — 85014 HEMATOCRIT: CPT | Performed by: PHYSICIAN ASSISTANT

## 2021-01-24 PROCEDURE — 80053 COMPREHEN METABOLIC PANEL: CPT | Performed by: PHYSICIAN ASSISTANT

## 2021-01-24 PROCEDURE — 85730 THROMBOPLASTIN TIME PARTIAL: CPT | Performed by: PHYSICIAN ASSISTANT

## 2021-01-24 PROCEDURE — 99024 POSTOP FOLLOW-UP VISIT: CPT | Performed by: SURGERY

## 2021-01-24 RX ORDER — TRAMADOL HYDROCHLORIDE 50 MG/1
50 TABLET ORAL EVERY 4 HOURS PRN
Status: DISCONTINUED | OUTPATIENT
Start: 2021-01-24 | End: 2021-01-27 | Stop reason: HOSPADM

## 2021-01-24 RX ORDER — POTASSIUM CHLORIDE 20 MEQ/1
40 TABLET, EXTENDED RELEASE ORAL ONCE
Status: COMPLETED | OUTPATIENT
Start: 2021-01-24 | End: 2021-01-24

## 2021-01-24 RX ORDER — POTASSIUM CHLORIDE 20 MEQ/1
20 TABLET, EXTENDED RELEASE ORAL ONCE
Status: DISCONTINUED | OUTPATIENT
Start: 2021-01-24 | End: 2021-01-24

## 2021-01-24 RX ORDER — TRAMADOL HYDROCHLORIDE 50 MG/1
50 TABLET ORAL EVERY 4 HOURS PRN
Status: DISCONTINUED | OUTPATIENT
Start: 2021-01-24 | End: 2021-01-24

## 2021-01-24 RX ADMIN — CEFAZOLIN SODIUM 2000 MG: 2 SOLUTION INTRAVENOUS at 11:44

## 2021-01-24 RX ADMIN — PANTOPRAZOLE SODIUM 40 MG: 40 TABLET, DELAYED RELEASE ORAL at 06:06

## 2021-01-24 RX ADMIN — METRONIDAZOLE 500 MG: 500 INJECTION, SOLUTION INTRAVENOUS at 04:01

## 2021-01-24 RX ADMIN — PANTOPRAZOLE SODIUM 40 MG: 40 TABLET, DELAYED RELEASE ORAL at 17:50

## 2021-01-24 RX ADMIN — CEFAZOLIN SODIUM 2000 MG: 2 SOLUTION INTRAVENOUS at 01:45

## 2021-01-24 RX ADMIN — ACETAMINOPHEN 650 MG: 325 TABLET, FILM COATED ORAL at 07:45

## 2021-01-24 RX ADMIN — HEPARIN SODIUM 5000 UNITS: 5000 INJECTION INTRAVENOUS; SUBCUTANEOUS at 14:07

## 2021-01-24 RX ADMIN — CEFAZOLIN SODIUM 2000 MG: 2 SOLUTION INTRAVENOUS at 17:50

## 2021-01-24 RX ADMIN — DEXTROSE, SODIUM CHLORIDE, AND POTASSIUM CHLORIDE 75 ML/HR: 5; .45; .15 INJECTION INTRAVENOUS at 16:43

## 2021-01-24 RX ADMIN — METRONIDAZOLE 500 MG: 500 INJECTION, SOLUTION INTRAVENOUS at 12:20

## 2021-01-24 RX ADMIN — POTASSIUM CHLORIDE 40 MEQ: 1500 TABLET, EXTENDED RELEASE ORAL at 07:45

## 2021-01-24 RX ADMIN — Medication 125 MG: at 06:06

## 2021-01-24 RX ADMIN — METRONIDAZOLE 500 MG: 500 INJECTION, SOLUTION INTRAVENOUS at 18:36

## 2021-01-24 RX ADMIN — Medication 125 MG: at 23:55

## 2021-01-24 RX ADMIN — Medication 125 MG: at 18:43

## 2021-01-24 RX ADMIN — ACETAMINOPHEN 650 MG: 325 TABLET, FILM COATED ORAL at 17:50

## 2021-01-24 RX ADMIN — ACETAMINOPHEN 650 MG: 325 TABLET, FILM COATED ORAL at 23:55

## 2021-01-24 RX ADMIN — HEPARIN SODIUM 5000 UNITS: 5000 INJECTION INTRAVENOUS; SUBCUTANEOUS at 06:06

## 2021-01-24 RX ADMIN — Medication 125 MG: at 11:44

## 2021-01-24 RX ADMIN — DEXTROSE, SODIUM CHLORIDE, AND POTASSIUM CHLORIDE 75 ML/HR: 5; .45; .15 INJECTION INTRAVENOUS at 01:44

## 2021-01-24 NOTE — PROGRESS NOTES
Progress Note - General Surgery   Irven Mems 65 y o  female MRN: [id number]  Unit/Bed#: W -01 Encounter: [id number]    Assessment:  65 F presenting with GI bleed s/p endoscopy on 1/20 w/ subsequent findings of pneumoperitoneum  S/p ex lap with drain placement on 1/21      MARÍA L: 55 cc from 40 cc SS  MARÍA R: 155 cc from  40 cc SS    Plan:  Clears t/c --> ADAT  ABX for 4 days  IVF  Maintain JPs  Replete K to goal of 4 to optimize bowel function  I/Os  -Record UOP and MARÍA output  DVT ppx  Pain/Nausea Control  OOB as tolerated  Incentive Spirometry  PT/OT-previous environment w/ support    Subjective/Objective     Subjective:   No acute events overnight  With Flatus  No BM  Tolerating clears t/c without N/V  Sitting in chair this AM   Objective:    Blood pressure 139/73, pulse 96, temperature 98 7 °F (37 1 °C), resp  rate 16, height 5' 6" (1 676 m), weight 74 kg (163 lb 2 3 oz), SpO2 92 %  ,Body mass index is 26 33 kg/m²  Intake/Output Summary (Last 24 hours) at 1/24/2021 0553  Last data filed at 1/24/2021 0301  Gross per 24 hour   Intake 2560 ml   Output 1810 ml   Net 750 ml       Invasive Devices     Peripheral Intravenous Line            Peripheral IV 01/21/21 Right Wrist 2 days          Drain            Closed/Suction Drain Left Abdomen Bulb 2 days    Closed/Suction Drain Right Abdomen Bulb 2 days                Physical Exam:   Gen:  NAD  HEENT: NCAT  MMM  CV: well perfused, pulses palpable  Lungs: Normal respiratory effort  Abd: soft, nt/nd, staples intact, incision cdi, drains intact  Skin: warm/ dry  Extremities: no peripheral edema, no clubbing or cyanosis  Neuro:  AxO x3      Results from last 7 days   Lab Units 01/23/21  0438 01/22/21  0520 01/21/21  0436   WBC Thousand/uL 12 09* 14 22* 10 48*   HEMOGLOBIN g/dL 9 9* 10 5* 10 6*   HEMATOCRIT % 30 6* 32 2* 33 2*   PLATELETS Thousands/uL 327 309 329     Results from last 7 days   Lab Units 01/23/21  0438 01/22/21  0520 01/21/21  0436   POTASSIUM mmol/L 3  3* 3 4* 3 5   CHLORIDE mmol/L 106 107 108   CO2 mmol/L 24 24 27   BUN mg/dL 9 7 7   CREATININE mg/dL 0 99 0 91 0 89   CALCIUM mg/dL 8 2* 8 0* 8 4     Results from last 7 days   Lab Units 01/19/21  1253   INR  0 96   PTT seconds 25        I have personally reviewed pertinent films in PACS      Medications:   Scheduled Meds:  Current Facility-Administered Medications   Medication Dose Route Frequency Provider Last Rate    acetaminophen  650 mg Oral Q6H PRN Estuardo Goncalves PA-C      cefazolin  2,000 mg Intravenous Q8H Indu Shah MD Stopped (01/24/21 0215)    dextrose 5 % and sodium chloride 0 45 % with KCl 20 mEq/L  75 mL/hr Intravenous Continuous Gale DEDRICK Minaya PA-C 75 mL/hr (01/24/21 0144)    heparin (porcine)  5,000 Units Subcutaneous Q8H [facility] Ninfa Negrete PA-C      HYDROmorphone  0 5 mg Intravenous Q3H PRN Ninfa Negrete PA-C      metroNIDAZOLE  500 mg Intravenous Q8H Indu Shah  mg (01/24/21 0401)    ondansetron  4 mg Intravenous Q6H PRN Indu Shah MD      oxyCODONE  2 5 mg Oral Q4H PRN Estuardo Goncalves PA-C      oxyCODONE  5 mg Oral Q4H PRN Ninfa Negrete PA-C      pantoprazole  40 mg Oral BID AC Indu Shah MD      simethicone  80 mg Oral Q6H PRN Skye Sarabia MD      vancomycin  125 mg Oral Q6H [facility] Indu Shah MD       Continuous Infusions:dextrose 5 % and sodium chloride 0 45 % with KCl 20 mEq/L, 75 mL/hr, Last Rate: 75 mL/hr (01/24/21 0144)      PRN Meds:  acetaminophen, 650 mg, Q6H PRN  HYDROmorphone, 0 5 mg, Q3H PRN  ondansetron, 4 mg, Q6H PRN  oxyCODONE, 2 5 mg, Q4H PRN  oxyCODONE, 5 mg, Q4H PRN  simethicone, 80 mg, Q6H PRN      VTE Pharmacologic Prophylaxis: Heparin  VTE Mechanical Prophylaxis: sequential compression device

## 2021-01-24 NOTE — ASSESSMENT & PLAN NOTE
· Secondary to microperforation  · Patient underwent EGD and colonoscopy on 1/20/2021  Subsequently she developed abdominal pain  · KUB was performed which was noted to show free air under the diaphragm bilaterally  · Patient was taken to the OR on 1/21/2021  There was no evidence of air leak with insufflation  Suspected to have micro perforation which had resolved  · Two MARÍA drains were placed and continue with minimal output at this time  Right side does have more output than left  · Diet advanced from clear liquid to full liquid with toast and crackers today  · Continue IV Ancef and Flagyl secondary to microperforation  Currently day 4    · Leukocytosis resolved  · Change pain control from oxycodone to tramadol and assess response  Patient felt unwell with use of oxycodone  · Now passing flatus

## 2021-01-24 NOTE — ASSESSMENT & PLAN NOTE
 CT scan on admission with no acute findings to account for GI bleeding  No colitis or abscess noted  She has stable postsurgical findings   S/P EGD and colonoscopy 1/20 - likely self limited diverticular bleed  Old blood noted and poor prep      EGD with moderate gastritis and candida esophagitis  o Continue PPI   Hemoglobin stable and has not required any transfusions     Lab Results   Component Value Date    HGB 9 4 (L) 01/24/2021    HGB 9 9 (L) 01/23/2021    HGB 10 5 (L) 01/22/2021    HGB 10 6 (L) 01/21/2021

## 2021-01-24 NOTE — ASSESSMENT & PLAN NOTE
· WBC 14 POD1 and now resolved  · Likely reactive from surgery and micro perforation  · No findings to suggest peritonitis    Patient will be continued on IV antibiotics prophylactically, day #4

## 2021-01-24 NOTE — PROGRESS NOTES
[facility]  Progress Note - Eloina Rise 1950, 65 y o  female MRN: [id number]  Unit/Bed#: YO SEGAL 401-01 Encounter: [id number]  Primary Care Provider: Mario Alberto Bui DO   Date and time admitted to hospital: 1/19/2021 12:21 PM    * Pneumoperitoneum  Assessment & Plan  · Secondary to microperforation  · Patient underwent EGD and colonoscopy on 1/20/2021  Subsequently she developed abdominal pain  · KUB was performed which was noted to show free air under the diaphragm bilaterally  · Patient was taken to the OR on 1/21/2021  There was no evidence of air leak with insufflation  Suspected to have micro perforation which had resolved  · Two MARÍA drains were placed and continue with minimal output at this time  Right side does have more output than left  · Diet advanced from clear liquid to full liquid with toast and crackers today  · Continue IV Ancef and Flagyl secondary to microperforation  Currently day 4    · Leukocytosis resolved  · Change pain control from oxycodone to tramadol and assess response  Patient felt unwell with use of oxycodone  · Now passing flatus  C  difficile diarrhea  Assessment & Plan  · Noted to have PCR positive but EIA  · Given no prior history and evidence of diarrhea, started on vancomycin  · Continue without patient vancomycin for a total of 14 days  · Continue through to 2/3/21     History of GI diverticular bleed  Assessment & Plan   CT scan on admission with no acute findings to account for GI bleeding  No colitis or abscess noted  She has stable postsurgical findings   S/P EGD and colonoscopy 1/20 - likely self limited diverticular bleed  Old blood noted and poor prep      EGD with moderate gastritis and candida esophagitis  o Continue PPI   Hemoglobin stable and has not required any transfusions     Lab Results   Component Value Date    HGB 9 4 (L) 01/24/2021    HGB 9 9 (L) 01/23/2021    HGB 10 5 (L) 01/22/2021    HGB 10 6 (L) 01/21/2021 Leukocytosis  Assessment & Plan  · WBC 14 POD1 and now resolved  · Likely reactive from surgery and micro perforation  · No findings to suggest peritonitis  Patient will be continued on IV antibiotics prophylactically, day #4    H/O hernia repair  Assessment & Plan  · History of hernia repair with Dr Marianela Michel on 2021  · She was seen in her office day prior to admission and subsequently sent into the emergency department with more episodes of diarrhea  · Feel that her acute GI bleeding is not related to surgery, and merely coincidental   · CT revealing stable postsurgical findings  · Outpatient follow-up as directed with surgery team    Hyperlipidemia  Assessment & Plan  · Continue statin  VTE Pharmacologic Prophylaxis: VTE Score: 3 Moderate Risk (Score 3-4) - Pharmacological DVT Prophylaxis Ordered: Heparin  Patient Centered Rounds: I have performed bedside rounds with nursing staff today  Unruly Sorenson  Discussions with Specialists or Other Care Team Provider: nursing    Education and Discussions with Family / Patient: Updated  () via phone  1228    Time Spent for Care: 30 minutes  More than 50% of total time spent on counseling and coordination of care as described above  Current Length of Stay: 5 day(s)  Current Patient Status: Inpatient   Certification Statement: The patient will continue to require additional inpatient hospital stay due to advance diet, monitor bowel movements, pain control, MARÍA drains  Discharge Plan: Anticipate discharge in 48 hrs to home      Code Status: Level 1 - Full Code    Subjective:   Passed gas overnight  Still burping  Did not tolerate the oxycodone yesterday     Objective:     Vitals:   Temp (24hrs), Av 2 °F (36 8 °C), Min:97 8 °F (36 6 °C), Max:98 7 °F (37 1 °C)    Temp:  [97 8 °F (36 6 °C)-98 7 °F (37 1 °C)] 97 8 °F (36 6 °C)  HR:  [] 105  Resp:  [14-16] 14  BP: (133-139)/(73-82) 137/82  SpO2:  [92 %-96 %] 93 %  Body mass index is 26 33 kg/m²  Input and Output Summary (last 24 hours): Intake/Output Summary (Last 24 hours) at 1/24/2021 1225  Last data filed at 1/24/2021 1015  Gross per 24 hour   Intake 2923 75 ml   Output 2945 ml   Net -21 25 ml       Physical Exam:   Physical Exam  Vitals signs and nursing note reviewed  Constitutional:       General: She is not in acute distress  Appearance: Normal appearance  She is not diaphoretic  HENT:      Head: Normocephalic and atraumatic  Mouth/Throat:      Mouth: Mucous membranes are moist    Cardiovascular:      Rate and Rhythm: Normal rate and regular rhythm  Pulmonary:      Effort: Pulmonary effort is normal       Breath sounds: Normal breath sounds  No stridor  No wheezing, rhonchi or rales  Abdominal:      General: Bowel sounds are normal       Palpations: Abdomen is soft  There is no mass  Tenderness: There is no abdominal tenderness  There is no guarding  Comments: Thornton in place  MARÍA drains in place in bilateral abdomen with serosangious drainage  Hypoactive drainage   Musculoskeletal:      Right lower leg: No edema  Left lower leg: No edema  Skin:     General: Skin is warm and dry  Neurological:      Mental Status: She is alert     Psychiatric:         Mood and Affect: Mood normal          Behavior: Behavior normal           Additional Data:     Labs:  Results from last 7 days   Lab Units 01/24/21  0527 01/23/21  0438   WBC Thousand/uL 9 14 12 09*   HEMOGLOBIN g/dL 9 4* 9 9*   HEMATOCRIT % 29 5* 30 6*   PLATELETS Thousands/uL 323 327   NEUTROS PCT %  --  85*   LYMPHS PCT %  --  10*   MONOS PCT %  --  5   EOS PCT %  --  0     Results from last 7 days   Lab Units 01/24/21  0527 01/23/21  0438   SODIUM mmol/L 138 140   POTASSIUM mmol/L 3 4* 3 3*   CHLORIDE mmol/L 106 106   CO2 mmol/L 25 24   BUN mg/dL 7 9   CREATININE mg/dL 0 84 0 99   ANION GAP mmol/L 7 10   CALCIUM mg/dL 8 1* 8 2*   ALBUMIN g/dL  --  2 4*   TOTAL BILIRUBIN mg/dL  --  0 33   ALK PHOS U/L  --  48   ALT U/L  --  24   AST U/L  --  19   GLUCOSE RANDOM mg/dL 124 130     Results from last 7 days   Lab Units 01/19/21  1253   INR  0 96             Results from last 7 days   Lab Units 01/19/21  1253   LACTIC ACID mmol/L 1 7       Lines/Drains:  Invasive Devices     Peripheral Intravenous Line            Peripheral IV 01/21/21 Right Wrist 2 days          Drain            Closed/Suction Drain Left Abdomen Bulb 2 days    Closed/Suction Drain Right Abdomen Bulb 2 days                Telemetry:        Imaging: No pertinent imaging reviewed  Recent Cultures (last 7 days):   Results from last 7 days   Lab Units 01/20/21  0001   C DIFF TOXIN B BY PCR  Positive*       Last 24 Hours Medication List:   Current Facility-Administered Medications   Medication Dose Route Frequency Provider Last Rate    acetaminophen  650 mg Oral Q6H PRN Aliyah Escobedo PA-C      cefazolin  2,000 mg Intravenous Q8H Zan Hopkins MD 2,000 mg (01/24/21 1144)    dextrose 5 % and sodium chloride 0 45 % with KCl 20 mEq/L  75 mL/hr Intravenous Continuous Gale DEDRICK Minaya PA-C 75 mL/hr (01/24/21 0144)    heparin (porcine)  5,000 Units Subcutaneous Q8H [facility] Ninfa Negrete PA-C      HYDROmorphone  0 5 mg Intravenous Q3H PRN Ninfa Negrete PA-C      metroNIDAZOLE  500 mg Intravenous Q8H Zan Hopkins  mg (01/24/21 1220)    ondansetron  4 mg Intravenous Q6H PRN Zan Hopkins MD      pantoprazole  40 mg Oral BID AC Zan Hopkins MD      simethicone  80 mg Oral Q6H PRN Marla Rosales MD      traMADol  50 mg Oral Q4H PRN Ashvin Negrete PA-C      vancomycin  125 mg Oral Q6H Lavanda Phoenix, MD          Today, Patient Was Seen By: Aliyah Escobedo PA-C    ** Please Note: Dictation voice to text software may have been used in the creation of this document   **

## 2021-01-24 NOTE — ASSESSMENT & PLAN NOTE
· History of hernia repair with Dr Xenia Kirkland on 1/4/2021  · She was seen in her office day prior to admission and subsequently sent into the emergency department with more episodes of diarrhea  · Feel that her acute GI bleeding is not related to surgery, and merely coincidental   · CT revealing stable postsurgical findings    · Outpatient follow-up as directed with surgery team

## 2021-01-24 NOTE — QUICK NOTE
Was informed by nursing staff that the patient had a bowel movement however this was bloody  Patient was noted to suddenly developed urge to have a bowel movement, sat on the commode and was noted to have liquid diarrhea  The hand underneath the commode was noted to fall to the floor  This was subsequently cleaned up by PCA and was noted to be red in nature  Myself and the patient's primary nurse were noted to evaluate the pads/items used to clean this up and noted bright red pads and occasional small clots  STAT H&H, CMP, Lactic acid, INR and PPT ordered  DVT prophylaxis discontinued  Given bright red appearance, unlikely to be prior old blood remaining in colon  There were small internal hemorrhoids on colonoscopy a prior episode was felt to be potentially related to self-limited diverticular bleed  Differential at this time would include recurrent diverticular bleed vs related to microperf vs related to hemorrhoids vs prior residual blood   updated via phone  GI and surgery aware  If any further bleeding, will notify both surgery and GI at that time   Would then consider CT GI bleed protocol vs  Endoscopic eval

## 2021-01-25 LAB
ANION GAP SERPL CALCULATED.3IONS-SCNC: 8 MMOL/L (ref 4–13)
BASOPHILS # BLD AUTO: 0.03 THOUSANDS/ΜL (ref 0–0.1)
BASOPHILS NFR BLD AUTO: 0 % (ref 0–1)
BUN SERPL-MCNC: 5 MG/DL (ref 5–25)
CALCIUM SERPL-MCNC: 8.7 MG/DL (ref 8.3–10.1)
CALPROTECTIN STL-MCNT: 245 UG/G (ref 0–120)
CHLORIDE SERPL-SCNC: 107 MMOL/L (ref 100–108)
CO2 SERPL-SCNC: 25 MMOL/L (ref 21–32)
CREAT SERPL-MCNC: 0.77 MG/DL (ref 0.6–1.3)
EOSINOPHIL # BLD AUTO: 0.51 THOUSAND/ΜL (ref 0–0.61)
EOSINOPHIL NFR BLD AUTO: 6 % (ref 0–6)
ERYTHROCYTE [DISTWIDTH] IN BLOOD BY AUTOMATED COUNT: 14.1 % (ref 11.6–15.1)
GFR SERPL CREATININE-BSD FRML MDRD: 77 ML/MIN/1.73SQ M
GLUCOSE SERPL-MCNC: 130 MG/DL (ref 65–140)
HCT VFR BLD AUTO: 30.3 % (ref 34.8–46.1)
HGB BLD-MCNC: 9.7 G/DL (ref 11.5–15.4)
IMM GRANULOCYTES # BLD AUTO: 0.05 THOUSAND/UL (ref 0–0.2)
IMM GRANULOCYTES NFR BLD AUTO: 1 % (ref 0–2)
LYMPHOCYTES # BLD AUTO: 0.94 THOUSANDS/ΜL (ref 0.6–4.47)
LYMPHOCYTES NFR BLD AUTO: 11 % (ref 14–44)
MCH RBC QN AUTO: 30.2 PG (ref 26.8–34.3)
MCHC RBC AUTO-ENTMCNC: 32 G/DL (ref 31.4–37.4)
MCV RBC AUTO: 94 FL (ref 82–98)
MONOCYTES # BLD AUTO: 0.45 THOUSAND/ΜL (ref 0.17–1.22)
MONOCYTES NFR BLD AUTO: 5 % (ref 4–12)
NEUTROPHILS # BLD AUTO: 6.35 THOUSANDS/ΜL (ref 1.85–7.62)
NEUTS SEG NFR BLD AUTO: 77 % (ref 43–75)
NRBC BLD AUTO-RTO: 0 /100 WBCS
PLATELET # BLD AUTO: 375 THOUSANDS/UL (ref 149–390)
PMV BLD AUTO: 9 FL (ref 8.9–12.7)
POTASSIUM SERPL-SCNC: 3.8 MMOL/L (ref 3.5–5.3)
RBC # BLD AUTO: 3.21 MILLION/UL (ref 3.81–5.12)
SODIUM SERPL-SCNC: 140 MMOL/L (ref 136–145)
WBC # BLD AUTO: 8.33 THOUSAND/UL (ref 4.31–10.16)

## 2021-01-25 PROCEDURE — 99024 POSTOP FOLLOW-UP VISIT: CPT | Performed by: SURGERY

## 2021-01-25 PROCEDURE — 80048 BASIC METABOLIC PNL TOTAL CA: CPT | Performed by: SURGERY

## 2021-01-25 PROCEDURE — 99232 SBSQ HOSP IP/OBS MODERATE 35: CPT | Performed by: INTERNAL MEDICINE

## 2021-01-25 PROCEDURE — 99233 SBSQ HOSP IP/OBS HIGH 50: CPT | Performed by: PHYSICIAN ASSISTANT

## 2021-01-25 PROCEDURE — 85025 COMPLETE CBC W/AUTO DIFF WBC: CPT | Performed by: SURGERY

## 2021-01-25 RX ORDER — DEXTROSE, SODIUM CHLORIDE, AND POTASSIUM CHLORIDE 5; .45; .15 G/100ML; G/100ML; G/100ML
50 INJECTION INTRAVENOUS CONTINUOUS
Status: DISCONTINUED | OUTPATIENT
Start: 2021-01-25 | End: 2021-01-26

## 2021-01-25 RX ORDER — CEFAZOLIN SODIUM 2 G/50ML
2000 SOLUTION INTRAVENOUS EVERY 8 HOURS
Status: COMPLETED | OUTPATIENT
Start: 2021-01-25 | End: 2021-01-25

## 2021-01-25 RX ADMIN — ACETAMINOPHEN 650 MG: 325 TABLET, FILM COATED ORAL at 08:52

## 2021-01-25 RX ADMIN — ONDANSETRON 4 MG: 2 INJECTION INTRAMUSCULAR; INTRAVENOUS at 20:38

## 2021-01-25 RX ADMIN — DEXTROSE, SODIUM CHLORIDE, AND POTASSIUM CHLORIDE 50 ML/HR: 5; .45; .15 INJECTION INTRAVENOUS at 23:59

## 2021-01-25 RX ADMIN — PANTOPRAZOLE SODIUM 40 MG: 40 TABLET, DELAYED RELEASE ORAL at 06:20

## 2021-01-25 RX ADMIN — Medication 125 MG: at 12:12

## 2021-01-25 RX ADMIN — Medication 125 MG: at 23:56

## 2021-01-25 RX ADMIN — METRONIDAZOLE 500 MG: 500 INJECTION, SOLUTION INTRAVENOUS at 14:50

## 2021-01-25 RX ADMIN — Medication 125 MG: at 06:20

## 2021-01-25 RX ADMIN — PANTOPRAZOLE SODIUM 40 MG: 40 TABLET, DELAYED RELEASE ORAL at 17:15

## 2021-01-25 RX ADMIN — CEFAZOLIN SODIUM 2000 MG: 2 SOLUTION INTRAVENOUS at 12:12

## 2021-01-25 RX ADMIN — CEFAZOLIN SODIUM 2000 MG: 2 SOLUTION INTRAVENOUS at 03:13

## 2021-01-25 RX ADMIN — METRONIDAZOLE 500 MG: 500 INJECTION, SOLUTION INTRAVENOUS at 22:37

## 2021-01-25 RX ADMIN — CEFAZOLIN SODIUM 2000 MG: 2 SOLUTION INTRAVENOUS at 18:31

## 2021-01-25 RX ADMIN — METRONIDAZOLE 500 MG: 500 INJECTION, SOLUTION INTRAVENOUS at 04:18

## 2021-01-25 RX ADMIN — DEXTROSE, SODIUM CHLORIDE, AND POTASSIUM CHLORIDE 75 ML/HR: 5; .45; .15 INJECTION INTRAVENOUS at 08:52

## 2021-01-25 RX ADMIN — Medication 125 MG: at 18:31

## 2021-01-25 NOTE — ASSESSMENT & PLAN NOTE
 CT scan on admission with no acute findings to account for GI bleeding  No colitis or abscess noted  She has stable postsurgical findings   S/P EGD and colonoscopy 1/20 - likely self limited diverticular bleed  Old blood noted and poor prep      EGD with moderate gastritis and candida esophagitis  o Continue PPI   Hemoglobin stable and has not required any transfusions     Lab Results   Component Value Date    HGB 9 7 (L) 01/25/2021    HGB 9 9 (L) 01/24/2021    HGB 9 4 (L) 01/24/2021    HGB 9 9 (L) 01/23/2021

## 2021-01-25 NOTE — ASSESSMENT & PLAN NOTE
· History of hernia repair with Dr Kelly Gonzales on 1/4/2021  · She was seen in her office day prior to admission and subsequently sent into the emergency department with more episodes of diarrhea  · Feel that her acute GI bleeding is not related to surgery, and merely coincidental   · CT revealing stable postsurgical findings    · Outpatient follow-up as directed with surgery team

## 2021-01-25 NOTE — PLAN OF CARE
Problem: PAIN - ADULT  Goal: Verbalizes/displays adequate comfort level or baseline comfort level  Description: Interventions:  - Encourage patient to monitor pain and request assistance  - Assess pain using appropriate pain scale  - Administer analgesics based on type and severity of pain and evaluate response  - Implement non-pharmacological measures as appropriate and evaluate response  - Consider cultural and social influences on pain and pain management  - Notify physician/advanced practitioner if interventions unsuccessful or patient reports new pain  Outcome: Progressing     Problem: Knowledge Deficit  Goal: Patient/family/caregiver demonstrates understanding of disease process, treatment plan, medications, and discharge instructions  Description: Complete learning assessment and assess knowledge base    Interventions:  - Provide teaching at level of understanding  - Provide teaching via preferred learning methods  Outcome: Progressing     Problem: GASTROINTESTINAL - ADULT  Goal: Maintains or returns to baseline bowel function  Description: INTERVENTIONS:  - Assess bowel function  - Encourage oral fluids to ensure adequate hydration  - Administer IV fluids if ordered to ensure adequate hydration  - Administer ordered medications as needed  - Encourage mobilization and activity  - Consider nutritional services referral to assist patient with adequate nutrition and appropriate food choices  Outcome: Progressing  Goal: Maintains adequate nutritional intake  Description: INTERVENTIONS:  - Monitor percentage of each meal consumed  - Identify factors contributing to decreased intake, treat as appropriate  - Assist with meals as needed  - Monitor I&O, weight, and lab values if indicated  - Obtain nutrition services referral as needed  Outcome: Progressing     Problem: Prexisting or High Potential for Compromised Skin Integrity  Goal: Skin integrity is maintained or improved  Description: INTERVENTIONS:  - Identify patients at risk for skin breakdown  - Assess and monitor skin integrity  - Assess and monitor nutrition and hydration status  - Monitor labs   - Assess for incontinence   - Turn and reposition patient  - Assist with mobility/ambulation  - Relieve pressure over bony prominences  - Avoid friction and shearing  - Provide appropriate hygiene as needed including keeping skin clean and dry  - Evaluate need for skin moisturizer/barrier cream  - Collaborate with interdisciplinary team   - Patient/family teaching  - Consider wound care consult   Outcome: Progressing     Problem: Nutrition/Hydration-ADULT  Goal: Nutrient/Hydration intake appropriate for improving, restoring or maintaining nutritional needs  Description: Monitor and assess patient's nutrition/hydration status for malnutrition  Collaborate with interdisciplinary team and initiate plan and interventions as ordered  Monitor patient's weight and dietary intake as ordered or per policy  Utilize nutrition screening tool and intervene as necessary  Determine patient's food preferences and provide high-protein, high-caloric foods as appropriate       INTERVENTIONS:  - Monitor oral intake, urinary output, labs, and treatment plans  - Assess nutrition and hydration status and recommend course of action  - Evaluate amount of meals eaten  - Assist patient with eating if necessary   - Allow adequate time for meals  - Recommend/ encourage appropriate diets, oral nutritional supplements, and vitamin/mineral supplements  - Order, calculate, and assess calorie counts as needed  - Recommend, monitor, and adjust tube feedings and TPN/PPN based on assessed needs  - Assess need for intravenous fluids  - Provide specific nutrition/hydration education as appropriate  - Include patient/family/caregiver in decisions related to nutrition  Outcome: Progressing

## 2021-01-25 NOTE — PROGRESS NOTES
Progress Note - Apryl Feldman 65 y o  female MRN: [id number]    Unit/Bed#: W -01 Encounter: [id number]    Assessment and Plan:   Principal Problem:    Pneumoperitoneum  Active Problems:    Hyperlipidemia    History of GI diverticular bleed    H/O hernia repair    C  difficile diarrhea    Leukocytosis    #1  Rectal bleeding: patient presented with this originally, s/p colonoscopy, thought to be diverticular bleed, then developed colonic perforation, had ex lap, no bowel resection needed  Last night had a stool with red bloood mixed in followed by 2 more this AM  Then had two BM with no blood  hgb stable  -monitor conservatively for now  -would be hesitant to perform colonoscopy again due to recent perf and surgery  -monitor hgb  -Can consider bleeding scan only if active bleeding  #2  C diff infection: no colitis noted on colonoscopy that would account for bleeding    -complete vanco 14 day course    ----------------------------------------------------------------------------------------------------------------    Subjective:     No abdominal pain  Actually feeling better  Had one episode last night and two this AM of stool with blood  Then 2 episodes of brown stool without blood  Objective:     Vitals: Blood pressure 145/95, pulse 105, temperature 98 °F (36 7 °C), resp  rate 16, height 5' 6" (1 676 m), weight 74 kg (163 lb 2 3 oz), SpO2 95 %  ,Body mass index is 26 33 kg/m²        Intake/Output Summary (Last 24 hours) at 1/25/2021 1147  Last data filed at 1/25/2021 5519  Gross per 24 hour   Intake 3185 ml   Output 2635 ml   Net 550 ml       Physical Exam:     General Appearance: Alert, appears stated age and cooperative  Lungs: Clear to auscultation bilaterally, no rales or rhonchi, no labored breathing/accessory muscle use  Heart: Regular rate and rhythm, S1, S2 normal, no murmur, click, rub or gallop  Abdomen: Soft, non-tender, mild discomfort at incision site, non-distended; bowel sounds normal; no masses or no organomegaly  Extremities: No cyanosis, clubbing, or edema    Invasive Devices     Peripheral Intravenous Line            Peripheral IV 01/21/21 Right Wrist 3 days          Drain            Closed/Suction Drain Left Abdomen Bulb 3 days    Closed/Suction Drain Right Abdomen Bulb 3 days                Lab Results:  Results from last 7 days   Lab Units 01/25/21  0843   WBC Thousand/uL 8 33   HEMOGLOBIN g/dL 9 7*   HEMATOCRIT % 30 3*   PLATELETS Thousands/uL 375   NEUTROS PCT % 77*   LYMPHS PCT % 11*   MONOS PCT % 5   EOS PCT % 6     Results from last 7 days   Lab Units 01/25/21  0840 01/24/21  1726   POTASSIUM mmol/L 3 8 3 7   CHLORIDE mmol/L 107 106   CO2 mmol/L 25 23   BUN mg/dL 5 6   CREATININE mg/dL 0 77 0 84   CALCIUM mg/dL 8 7 8 5   ALK PHOS U/L  --  56   ALT U/L  --  13   AST U/L  --  15     Invalid input(s): BILI  Results from last 7 days   Lab Units 01/24/21  1739   INR  1 01     Results from last 7 days   Lab Units 01/19/21  1253   LIPASE u/L 179       Imaging Studies: I have personally reviewed pertinent imaging studies  Xr Abdomen 1 View Kub    Result Date: 1/21/2021  Impression: Significant worsening of free air beneath the diaphragm when compared to earlier CT scan  Findings consistent with perforated viscus until proven otherwise  Emergent surgical consultation recommended  Consider follow-up CT scan of the abdomen and pelvis for further evaluation  I personally discussed this study with Sandeep Arshad on 1/21/2021 at 11:14 AM  Workstation performed: [id number]     Ct Abdomen Pelvis With Contrast    Result Date: 1/19/2021  Impression: No acute inflammatory process identified  Diverticulosis without evidence of acute diverticulitis  Small volume pneumomediastinum and small focus of pneumoperitoneum  These are likely secondary to recent laparoscopic surgery  No free fluid in the abdomen to suggest visceral perforation   Mild edema in the right lower quadrant intra-abdominal fat compatible with sequela of recent laparoscopic surgery  The study was marked in [facility] for immediate notification   Workstation performed: [id number]

## 2021-01-25 NOTE — CASE MANAGEMENT
LOS 6  Patient is not a bundle or a readmission  CM spoke with patient at the beside  CM introduced self and role  Patient lives in a [address] with her  in [place]  Patient is independent with ADLS  Patient does not use DME at home  Patient denies history of VNA and inpatient rehab  [facility] uses [facility] [place] off [address]  Patient has prescription insurance and is able to afford her medications  Patient does not have an advance directive/living will  Patient's  Alexa Correa is her emergency contact, [phone]  Patient is retired and currently drives  Patient denies history of drug/alcohol abuse  Patient denies history of mental health illness  Patient's PCP is Dr Russell Jordan with [facility]  Patient's  will transport at discharge  CM discussed with patient at the bedside re:SAMEERA  Patient updated per PT, the recommendation at discharge is HHPT and family support at discharge  CM discussed VNA services at discharge  Patient educated on [place] [facility]  Patient declining VNA at discharge  Patient stated her niece is a PT and able to help at home  Patient's  is her support system at home  Patient requesting any new prescriptions be sent [facility] [place]  Patient stated her  is able to transport at discharge  Patient has no other current needs at this time  CM will continue to f/u with patient re:DARIOP  CM reviewed discharge planning process including the following: identifying caregivers at home, preference for d/c planning needs,   availability of [facility] program, availability of treatment team to discuss questions or concerns patient and/or family may have regarding diagnosis, plan of care, old or new medications and discharge planning   CM will continue to follow for care coordination and update assessment as appropriate

## 2021-01-25 NOTE — ASSESSMENT & PLAN NOTE
· WBC 14 POD1 and now resolved  · Likely reactive from surgery and micro perforation  · No findings to suggest peritonitis  Final day of Ancef and Flagyl today, will discontinue after final dose

## 2021-01-25 NOTE — ASSESSMENT & PLAN NOTE
· Secondary to microperforation  · Patient underwent EGD and colonoscopy on 1/20/2021  Subsequently she developed abdominal pain  · KUB was performed which was noted to show free air under the diaphragm bilaterally  · Patient was taken to the OR on 1/21/2021  There was no evidence of air leak with insufflation  Suspected to have micro perforation which had resolved  · Two MARÍA drains were placed and continue with minimal output at this time  · Trial surgical diet today and advance as tolerated  If her bowel movements continue to be nonbloody this morning likely no further GI/surgery workup as an inpatient  · Continue tramadol p r n  Pain

## 2021-01-25 NOTE — PROGRESS NOTES
Progress Note - General Surgery   Sindy Almaraz 65 y o  female MRN: [id number]  Unit/Bed#: W -01 Encounter: [id number]    Assessment:  65 y o  female p/w GIB s/p endoscopy 1/20 c/b perforation now 4 Days Post-Op s/p Procedure(s) (LRB):  LAPAROTOMY EXPLORATORY W/ REPAIR OF UMBLICAL HERNIA (N/A)    MARÍA L: 30 cc SS  MARÍA R: 75 cc SS    Plan:  Diet NPO; Sips with meds   Advance diet as tolerated  Continue antibiotics  OOBTC, Ambulate TID  Pulmonary Toilet, IS  PPx: PPI, consider restarting chemical DVT ppx if HGB stable  Pain and Nausea control PRN  PT/OT: Return to previous environment with social support, Other (Comment)(HHPT and family support pending progress)      Subjective/Objective     Subjective: Patient had an episode of blood in her stool overnight  HGB on recheck was stable  Patient denies abdominal pain or nausea  Denied lightheadedness or dizziness  Afebrile  Objective:   Blood pressure 145/95, pulse 105, temperature 98 °F (36 7 °C), resp  rate 16, height 5' 6" (1 676 m), weight 74 kg (163 lb 2 3 oz), SpO2 95 %  ,Body mass index is 26 33 kg/m²  I/O       01/23 0701 - 01/24 0700 01/24 0701 - 01/25 0700 01/25 0701 - 01/26 0700    P  O  1200 960     I V  (mL/kg) 1713 8 (23 2) 1800 (24 3)     IV Piggyback 450 450     Total Intake(mL/kg) 3363 8 (45 5) 3210 (43 4)     Urine (mL/kg/hr) 1950 (1 1) 2775 (1 6)     Drains 210 105     Stool 0 0     Total Output 2160 2880     Net +1203 8 +330            Unmeasured Urine Occurrence  1 x     Unmeasured Stool Occurrence 0 x 2 x             Invasive Devices     Peripheral Intravenous Line            Peripheral IV 01/21/21 Right Wrist 3 days          Drain            Closed/Suction Drain Left Abdomen Bulb 3 days    Closed/Suction Drain Right Abdomen Bulb 3 days                Physical Exam:   Gen: NAD, Comfortable  Neuro: A&O, No focal deficits  Head: Normal Cephalic, Atraumatic  Eye: EOMI, PERRLA, No scleral icterus  Neck: Supple, No JVD, Midline trachea  CV: RRR, Cap refill <2 sec  Pulm: Normal work of breathing, no respiratory distress  Abd: Soft, Non-Distended, Non-Tender, incision CDI, drains with serosang output  Ext: No edema, Non-tender  Skin: warm, dry, intact    Lab, Imaging and other studies:  Recent Labs     01/23/21 0438 01/24/21  0527 01/24/21  1739   WBC 12 09* 9 14  --    HGB 9 9* 9 4* 9 9*    323  --      Recent Labs     01/23/21 0438 01/24/21  0527 01/24/21  1726   SODIUM 140 138 138   K 3 3* 3 4* 3 7    106 106   CO2 24 25 23   BUN 9 7 6   CREATININE 0 99 0 84 0 84   MG 1 8  --   --    CALCIUM 8 2* 8 1* 8 5     COAG - PT/INR/PTT: 13 4/1 01/32 (01/24 1739)     VTE Pharmacologic Prophylaxis: Sequential compression device (Venodyne)   VTE Mechanical Prophylaxis: sequential compression device

## 2021-01-25 NOTE — PROGRESS NOTES
[address] - Internal Medicine Service  Progress Note - Venecia Alvarenga 1950, 65 y o  female MRN: [id number]  Unit/Bed#: W -01 Encounter: [id number]  Primary Care Provider: Bertha Orellana DO   Date and time admitted to hospital: 1/19/2021 12:21 PM    History of GI diverticular bleed  Assessment & Plan   CT scan on admission with no acute findings to account for GI bleeding  No colitis or abscess noted  She has stable postsurgical findings   S/P EGD and colonoscopy 1/20 - likely self limited diverticular bleed  Old blood noted and poor prep   EGD with moderate gastritis and candida esophagitis  o Continue PPI   Hemoglobin stable and has not required any transfusions     Lab Results   Component Value Date    HGB 9 7 (L) 01/25/2021    HGB 9 9 (L) 01/24/2021    HGB 9 4 (L) 01/24/2021    HGB 9 9 (L) 01/23/2021       Leukocytosis  Assessment & Plan  · WBC 14 POD1 and now resolved  · Likely reactive from surgery and micro perforation  · No findings to suggest peritonitis  Final day of Ancef and Flagyl today, will discontinue after final dose  C  difficile diarrhea  Assessment & Plan  · Noted to have PCR positive but EIA  · Given no prior history and evidence of diarrhea, started on vancomycin  · Continue without patient vancomycin for a total of 14 days  · Continue through to 2/3/21     H/O hernia repair  Assessment & Plan  · History of hernia repair with Dr Rachel Shankar on 1/4/2021  · She was seen in her office day prior to admission and subsequently sent into the emergency department with more episodes of diarrhea  · Feel that her acute GI bleeding is not related to surgery, and merely coincidental   · CT revealing stable postsurgical findings  · Outpatient follow-up as directed with surgery team    Hyperlipidemia  Assessment & Plan  · Continue statin      * Pneumoperitoneum  Assessment & Plan  · Secondary to microperforation  · Patient underwent EGD and colonoscopy on 2021  Subsequently she developed abdominal pain  · KUB was performed which was noted to show free air under the diaphragm bilaterally  · Patient was taken to the OR on 2021  There was no evidence of air leak with insufflation  Suspected to have micro perforation which had resolved  · Two MARÍA drains were placed and continue with minimal output at this time  · Trial surgical diet today and advance as tolerated  If her bowel movements continue to be nonbloody this morning likely no further GI/surgery workup as an inpatient  · Continue tramadol p r n  Pain  VTE Pharmacologic Prophylaxis:   Pharmacologic: Heparin  Mechanical VTE Prophylaxis in Place: Yes    Patient Centered Rounds: I have performed bedside rounds with nursing staff today  Discussions with Specialists or Other Care Team Provider: CM, gen surg, GI, nursing    Education and Discussions with Family / Patient: patient at bedside, called     Time Spent for Care: 30 minutes  More than 50% of total time spent on counseling and coordination of care as described above  Current Length of Stay: 6 day(s)    Current Patient Status: Inpatient   Certification Statement: The patient will continue to require additional inpatient hospital stay due to Restarting diet today, monitor hemoglobin for stability  Discharge Plan: 24-48 hours    Code Status: Level 1 - Full Code      Subjective:   Patient reported last night and this morning she had 2 bloody bowel movements, however has had 2 additional bowel movements which are formed and brown  Mild specks of blood this morning but no rob bleeding  She still having some abdominal discomfort but it is improved overall  Still having drainage from her MARÍA drains  Denies any chills, nausea, vomiting      Objective:     Vitals:   Temp (24hrs), Av 2 °F (36 8 °C), Min:98 °F (36 7 °C), Max:98 5 °F (36 9 °C)    Temp:  [98 °F (36 7 °C)-98 5 °F (36 9 °C)] 98 °F (36 7 °C)  HR:  [] 105  Resp:  [16] 16  BP: (138-145)/(82-95) 145/95  SpO2:  [94 %-95 %] 95 %  Body mass index is 26 33 kg/m²  Input and Output Summary (last 24 hours): Intake/Output Summary (Last 24 hours) at 1/25/2021 1112  Last data filed at 1/25/2021 6647  Gross per 24 hour   Intake 3185 ml   Output 2635 ml   Net 550 ml       Physical Exam:       Physical Exam  Vitals signs and nursing note reviewed  Constitutional:       General: She is not in acute distress  Appearance: Normal appearance  HENT:      Head: Normocephalic  Mouth/Throat:      Mouth: Mucous membranes are moist    Eyes:      General: No scleral icterus  Pupils: Pupils are equal, round, and reactive to light  Cardiovascular:      Rate and Rhythm: Normal rate and regular rhythm  Heart sounds: No murmur  Pulmonary:      Effort: Pulmonary effort is normal  No respiratory distress  Breath sounds: Normal breath sounds  No wheezing, rhonchi or rales  Abdominal:      General: Bowel sounds are normal  There is no distension  Palpations: Abdomen is soft  Tenderness: There is abdominal tenderness (mild)  Comments: Incisions are clean dry and intact with no drainage or erythema  She has two MARÍA drains bilaterally in her abdomen which are serosanguineous no evidence of pus  Musculoskeletal:         General: No swelling  Right lower leg: No edema  Left lower leg: No edema  Skin:     Capillary Refill: Capillary refill takes less than 2 seconds  Neurological:      General: No focal deficit present  Mental Status: She is alert and oriented to person, place, and time  Mental status is at baseline          Additional Data:     Labs:    Results from last 7 days   Lab Units 01/25/21  0843   WBC Thousand/uL 8 33   HEMOGLOBIN g/dL 9 7*   HEMATOCRIT % 30 3*   PLATELETS Thousands/uL 375   NEUTROS PCT % 77*   LYMPHS PCT % 11*   MONOS PCT % 5   EOS PCT % 6     Results from last 7 days   Lab Units 01/25/21  0840 01/24/21  1726   SODIUM mmol/L 140 138   POTASSIUM mmol/L 3 8 3 7   CHLORIDE mmol/L 107 106   CO2 mmol/L 25 23   BUN mg/dL 5 6   CREATININE mg/dL 0 77 0 84   ANION GAP mmol/L 8 9   CALCIUM mg/dL 8 7 8 5   ALBUMIN g/dL  --  2 5*   TOTAL BILIRUBIN mg/dL  --  0 31   ALK PHOS U/L  --  56   ALT U/L  --  13   AST U/L  --  15   GLUCOSE RANDOM mg/dL 130 116     Results from last 7 days   Lab Units 01/24/21  1739   INR  1 01             Results from last 7 days   Lab Units 01/24/21  1739 01/19/21  1253   LACTIC ACID mmol/L 1 5 1 7           * I Have Reviewed All Lab Data Listed Above  * Additional Pertinent Lab Tests Reviewed: All Labs Within Last 24 Hours Reviewed    Imaging:    Imaging Reports Reviewed Today Include: none  Imaging Personally Reviewed by Myself Includes:  none    Recent Cultures (last 7 days):     Results from last 7 days   Lab Units 01/20/21  0001   C DIFF TOXIN B BY PCR  Positive*       Last 24 Hours Medication List:   Current Facility-Administered Medications   Medication Dose Route Frequency Provider Last Rate    acetaminophen  650 mg Oral Q6H PRN Ninfa Negrete PA-C      cefazolin  2,000 mg Intravenous Q8H Елена Rascon PA-C      dextrose 5 % and sodium chloride 0 45 % with KCl 20 mEq/L  50 mL/hr Intravenous Continuous Juanito Gomez DO      HYDROmorphone  0 5 mg Intravenous Q3H PRN Ninfa Negrete PA-C      metroNIDAZOLE  500 mg Intravenous Q8H Devendra Blake PA-C      ondansetron  4 mg Intravenous Q6H PRN Ej Tom MD      pantoprazole  40 mg Oral BID AC Ej Tom MD      simethicone  80 mg Oral Q6H PRN Selene Mcfarland MD      traMADol  50 mg Oral Q4H PRN Gt Negrete PA-C      vancomycin  125 mg Oral Q6H Anand Zuñiga MD          Today, Patient Was Seen By: Ab Del Cid PA-C    ** Please Note: Dictation voice to text software may have been used in the creation of this document   *

## 2021-01-26 PROBLEM — D72.829 LEUKOCYTOSIS: Status: RESOLVED | Noted: 2021-01-22 | Resolved: 2021-01-26

## 2021-01-26 LAB
ANION GAP SERPL CALCULATED.3IONS-SCNC: 6 MMOL/L (ref 4–13)
BUN SERPL-MCNC: 4 MG/DL (ref 5–25)
CALCIUM SERPL-MCNC: 8.6 MG/DL (ref 8.3–10.1)
CHLORIDE SERPL-SCNC: 106 MMOL/L (ref 100–108)
CO2 SERPL-SCNC: 27 MMOL/L (ref 21–32)
CREAT SERPL-MCNC: 0.84 MG/DL (ref 0.6–1.3)
ERYTHROCYTE [DISTWIDTH] IN BLOOD BY AUTOMATED COUNT: 14.1 % (ref 11.6–15.1)
GFR SERPL CREATININE-BSD FRML MDRD: 70 ML/MIN/1.73SQ M
GLUCOSE SERPL-MCNC: 117 MG/DL (ref 65–140)
HCT VFR BLD AUTO: 31.6 % (ref 34.8–46.1)
HGB BLD-MCNC: 10 G/DL (ref 11.5–15.4)
MCH RBC QN AUTO: 29.9 PG (ref 26.8–34.3)
MCHC RBC AUTO-ENTMCNC: 31.6 G/DL (ref 31.4–37.4)
MCV RBC AUTO: 94 FL (ref 82–98)
PLATELET # BLD AUTO: 396 THOUSANDS/UL (ref 149–390)
PMV BLD AUTO: 9.1 FL (ref 8.9–12.7)
POTASSIUM SERPL-SCNC: 4 MMOL/L (ref 3.5–5.3)
RBC # BLD AUTO: 3.35 MILLION/UL (ref 3.81–5.12)
SODIUM SERPL-SCNC: 139 MMOL/L (ref 136–145)
WBC # BLD AUTO: 7.78 THOUSAND/UL (ref 4.31–10.16)

## 2021-01-26 PROCEDURE — 99024 POSTOP FOLLOW-UP VISIT: CPT | Performed by: SURGERY

## 2021-01-26 PROCEDURE — 85027 COMPLETE CBC AUTOMATED: CPT | Performed by: SURGERY

## 2021-01-26 PROCEDURE — 80048 BASIC METABOLIC PNL TOTAL CA: CPT | Performed by: SURGERY

## 2021-01-26 PROCEDURE — 99233 SBSQ HOSP IP/OBS HIGH 50: CPT | Performed by: PHYSICIAN ASSISTANT

## 2021-01-26 RX ADMIN — Medication 125 MG: at 12:36

## 2021-01-26 RX ADMIN — Medication 125 MG: at 05:43

## 2021-01-26 RX ADMIN — ACETAMINOPHEN 650 MG: 325 TABLET, FILM COATED ORAL at 17:43

## 2021-01-26 RX ADMIN — Medication 125 MG: at 17:53

## 2021-01-26 RX ADMIN — PANTOPRAZOLE SODIUM 40 MG: 40 TABLET, DELAYED RELEASE ORAL at 05:43

## 2021-01-26 RX ADMIN — PANTOPRAZOLE SODIUM 40 MG: 40 TABLET, DELAYED RELEASE ORAL at 17:43

## 2021-01-26 RX ADMIN — METRONIDAZOLE 500 MG: 500 INJECTION, SOLUTION INTRAVENOUS at 07:28

## 2021-01-26 RX ADMIN — ACETAMINOPHEN 650 MG: 325 TABLET, FILM COATED ORAL at 00:09

## 2021-01-26 NOTE — ASSESSMENT & PLAN NOTE
 CT scan on admission with no acute findings to account for GI bleeding  No colitis or abscess noted  She has stable postsurgical findings   S/P EGD and colonoscopy 1/20 - likely self limited diverticular bleed  Old blood noted and poor prep      EGD with moderate gastritis and candida esophagitis  o Continue PPI   Hemoglobin stable and has not required any transfusions     Lab Results   Component Value Date    HGB 10 0 (L) 01/26/2021    HGB 9 7 (L) 01/25/2021    HGB 9 9 (L) 01/24/2021    HGB 9 4 (L) 01/24/2021

## 2021-01-26 NOTE — ASSESSMENT & PLAN NOTE
· History of hernia repair with Dr Osvaldo Rubio on 1/4/2021  · She was seen in her office day prior to admission and subsequently sent into the emergency department with more episodes of diarrhea  · Feel that her acute GI bleeding is not related to surgery, and merely coincidental   · CT revealing stable postsurgical findings    · Outpatient follow-up as directed with surgery team

## 2021-01-26 NOTE — PROGRESS NOTES
[facility] - Internal Medicine Service  Progress Note - Ezio Mcginnis 1950, 65 y o  female MRN: [id number]  Unit/Bed#: W -01 Encounter: [id number]  Primary Care Provider: Cindi Allred DO   Date and time admitted to hospital: 1/19/2021 12:21 PM    History of GI diverticular bleed  Assessment & Plan   CT scan on admission with no acute findings to account for GI bleeding  No colitis or abscess noted  She has stable postsurgical findings   S/P EGD and colonoscopy 1/20 - likely self limited diverticular bleed  Old blood noted and poor prep   EGD with moderate gastritis and candida esophagitis  o Continue PPI   Hemoglobin stable and has not required any transfusions     Lab Results   Component Value Date    HGB 10 0 (L) 01/26/2021    HGB 9 7 (L) 01/25/2021    HGB 9 9 (L) 01/24/2021    HGB 9 4 (L) 01/24/2021       * Pneumoperitoneum  Assessment & Plan  · Secondary to microperforation  · Patient underwent EGD and colonoscopy on 1/20/2021  Subsequently she developed abdominal pain  · KUB was performed which was noted to show free air under the diaphragm bilaterally  · Patient was taken to the OR on 1/21/2021  There was no evidence of air leak with insufflation  Suspected to have micro perforation which had resolved  · Two MARÍA drains were placed and continue with minimal output at this time  · Trial soft surgical diet today and advance as tolerated  · Continue tramadol p r n  Pain  · Likely discharge with surgical follow up tomorrow  C  difficile diarrhea  Assessment & Plan  · Noted to have PCR positive but EIA  · Given no prior history and evidence of diarrhea, started on vancomycin  · Continue without patient vancomycin for a total of 14 days     · Continue through to 2/3/21     H/O hernia repair  Assessment & Plan  · History of hernia repair with Dr Caitlin Balderas on 1/4/2021  · She was seen in her office day prior to admission and subsequently sent into the emergency department with more episodes of diarrhea  · Feel that her acute GI bleeding is not related to surgery, and merely coincidental   · CT revealing stable postsurgical findings  · Outpatient follow-up as directed with surgery team    Hyperlipidemia  Assessment & Plan  · Continue statin  Leukocytosis-resolved as of 2021  Assessment & Plan  · WBC 14 POD1 and now resolved  · Likely reactive from surgery and micro perforation  · No findings to suggest peritonitis  VTE Pharmacologic Prophylaxis:   Pharmacologic: discharge tomorrow  Mechanical VTE Prophylaxis in Place: Yes    Patient Centered Rounds: I have performed bedside rounds with nursing staff today  Discussions with Specialists or Other Care Team Provider: CM, nursing, surgery    Education and Discussions with Family / Patient: called     Time Spent for Care: 30 minutes  More than 50% of total time spent on counseling and coordination of care as described above  Current Length of Stay: 7 day(s)    Current Patient Status: Inpatient   Certification Statement: The patient will continue to require additional inpatient hospital stay due to advancing diet, surgical followup tomorrow    Discharge Plan: tomorrow morning    Code Status: Level 1 - Full Code      Subjective:   Patient reports she is feeling much better today  Reports she tolerated full liquid diet yesterday and is ready  Her abdominal pain improving  She had a bowel movement yesterday which was normal   No reports of any blood in her stool  Objective:     Vitals:   Temp (24hrs), Av 2 °F (36 8 °C), Min:97 9 °F (36 6 °C), Max:98 5 °F (36 9 °C)    Temp:  [97 9 °F (36 6 °C)-98 5 °F (36 9 °C)] 97 9 °F (36 6 °C)  HR:  [] 83  Resp:  [15-16] 15  BP: (147-148)/(84-92) 147/85  SpO2:  [92 %-95 %] 92 %  Body mass index is 26 33 kg/m²  Input and Output Summary (last 24 hours):        Intake/Output Summary (Last 24 hours) at 2021 1312  Last data filed at 2021 1237  Gross per 24 hour   Intake 1376 66 ml   Output 1585 ml   Net -208 34 ml       Physical Exam:     Physical Exam  Vitals signs and nursing note reviewed  Constitutional:       General: She is not in acute distress  Appearance: Normal appearance  HENT:      Head: Normocephalic  Mouth/Throat:      Mouth: Mucous membranes are moist    Eyes:      General: No scleral icterus  Pupils: Pupils are equal, round, and reactive to light  Cardiovascular:      Rate and Rhythm: Normal rate and regular rhythm  Heart sounds: No murmur  Pulmonary:      Effort: Pulmonary effort is normal  No respiratory distress  Breath sounds: Normal breath sounds  No wheezing, rhonchi or rales  Abdominal:      General: Bowel sounds are normal  There is no distension  Palpations: Abdomen is soft  Tenderness: There is no abdominal tenderness  Comments: MARÍA drains x2 in place, serosanguinous drainage  Abdominal staples in place, c/d/i   Musculoskeletal:         General: No swelling  Right lower leg: No edema  Left lower leg: No edema  Skin:     Capillary Refill: Capillary refill takes less than 2 seconds  Neurological:      General: No focal deficit present  Mental Status: She is alert and oriented to person, place, and time  Mental status is at baseline           Additional Data:     Labs:    Results from last 7 days   Lab Units 01/26/21  0605 01/25/21  0843   WBC Thousand/uL 7 78 8 33   HEMOGLOBIN g/dL 10 0* 9 7*   HEMATOCRIT % 31 6* 30 3*   PLATELETS Thousands/uL 396* 375   NEUTROS PCT %  --  77*   LYMPHS PCT %  --  11*   MONOS PCT %  --  5   EOS PCT %  --  6     Results from last 7 days   Lab Units 01/26/21  0605  01/24/21  1726   SODIUM mmol/L 139   < > 138   POTASSIUM mmol/L 4 0   < > 3 7   CHLORIDE mmol/L 106   < > 106   CO2 mmol/L 27   < > 23   BUN mg/dL 4*   < > 6   CREATININE mg/dL 0 84   < > 0 84   ANION GAP mmol/L 6   < > 9   CALCIUM mg/dL 8 6   < > 8 5   ALBUMIN g/dL  -- --  2 5*   TOTAL BILIRUBIN mg/dL  --   --  0 31   ALK PHOS U/L  --   --  56   ALT U/L  --   --  13   AST U/L  --   --  15   GLUCOSE RANDOM mg/dL 117   < > 116    < > = values in this interval not displayed  Results from last 7 days   Lab Units 01/24/21  1739   INR  1 01             Results from last 7 days   Lab Units 01/24/21  1739   LACTIC ACID mmol/L 1 5           * I Have Reviewed All Lab Data Listed Above  * Additional Pertinent Lab Tests Reviewed: All Labs Within Last 24 Hours Reviewed    Imaging:    Imaging Reports Reviewed Today Include: none  Imaging Personally Reviewed by Myself Includes:  none    Recent Cultures (last 7 days):     Results from last 7 days   Lab Units 01/20/21  0001   C DIFF TOXIN B BY PCR  Positive*       Last 24 Hours Medication List:   Current Facility-Administered Medications   Medication Dose Route Frequency Provider Last Rate    acetaminophen  650 mg Oral Q6H PRN Ninfa Negrete PA-C      HYDROmorphone  0 5 mg Intravenous Q3H PRN Ninfa Negrete PA-C      ondansetron  4 mg Intravenous Q6H PRN Marii Mesa MD      pantoprazole  40 mg Oral BID AC Marii Mesa MD      simethicone  80 mg Oral Q6H PRN Jeovany Pinto MD      traMADol  50 mg Oral Q4H PRN Dasia Negrete PA-C      vancomycin  125 mg Oral Q6H Sp Greenwood MD          Today, Patient Was Seen By: Vania Ross PA-C    ** Please Note: Dictation voice to text software may have been used in the creation of this document   **

## 2021-01-26 NOTE — OCCUPATIONAL THERAPY NOTE
Occupational Therapy Evaluation     Patient Name: Apryl Feldman  CRMIX'O Date: 1/26/2021  Problem List  Principal Problem:    Pneumoperitoneum  Active Problems:    Hyperlipidemia    History of GI diverticular bleed    H/O hernia repair    C  difficile diarrhea    Past Medical History  Past Medical History:   Diagnosis Date    Cervical cancer ([facility] [address] )     last assessed [id number]    Diverticulosis     Ear problems     HL (hearing loss)     Hyperlipemia     Tinnitus      Past Surgical History  Past Surgical History:   Procedure Laterality Date    BREAST BIOPSY Left     COLONOSCOPY      (Fiberoptic)    EXPLORATORY LAPAROTOMY W/ BOWEL RESECTION N/A 1/21/2021    Procedure: LAPAROTOMY EXPLORATORY W/ REPAIR OF UMBLICAL HERNIA;  Surgeon: Cat Kyle MD;  Location: [facility];  Service: General    HYSTERECTOMY      ND Brad Nur 19 Right 1/6/2021    Procedure: ROBOTIC INGUINAL HERNIA REPAIR;  Surgeon: Jesse Philip MD;  Location: [facility];  Service: General           01/26/21 1320   Note Type   Note type Screen   Assessment   Assessment Recieved orders, reviewed chart, and discussed patient with PT and CM  Per PT, pt is suitable to return home with social support at [place], per CM, pt reports she has no post acute needs from therapy  OT will sign off at this time  Please reconsult if needed      Recommendation   OT Discharge Recommendation Return to previous environment with social support     John Boss, MOT, OTR/L

## 2021-01-26 NOTE — ASSESSMENT & PLAN NOTE
· WBC 14 POD1 and now resolved  · Likely reactive from surgery and micro perforation  · No findings to suggest peritonitis

## 2021-01-26 NOTE — PROGRESS NOTES
Pt previously on NPO/Clear liquid/Full liquid diet x 6 days, recently advanced to surgical soft today  Reports she ordered eggs and hot tea for L  Tolerated some of cream of mushroom soup, crackers and tea at D yesterday  Sipped on ensure enlive though did not finish it  Said she does not tolerate the ensure clear, recommend d/c of this supplement  Continue ensure enlive TID, encouraged to sip in between meals  Recommend advance diet as tolerated to cardiac diet

## 2021-01-26 NOTE — ASSESSMENT & PLAN NOTE
· History of hernia repair with Dr Jana Fuller on 1/4/2021  · She was seen in her office day prior to admission and subsequently sent into the emergency department with more episodes of diarrhea  · Feel that her acute GI bleeding is not related to surgery, and merely coincidental   · CT revealing stable postsurgical findings    · Outpatient follow-up as directed with surgery team

## 2021-01-26 NOTE — PROGRESS NOTES
Progress Note - General Surgery   Eliona Lweis 65 y o  female MRN: [id number]  Unit/Bed#: W -01 Encounter: [id number]    Assessment:  65 y o  female p/w GIB s/p endoscopy 1/20 c/b perforation now 5 Days Post-Op s/p Procedure(s) (LRB):  LAPAROTOMY EXPLORATORY W/ REPAIR OF UMBLICAL HERNIA (N/A)    MARÍA L: 30 cc SS  MARÍA R: 75 cc SS    Plan:  Diet Surgical; Fl Liq Toast Crax   Advance diet as tolerated  Continue antibiotics  OOBTC, Ambulate TID  Pulmonary Toilet, IS  PPx: PPI, consider restarting chemical DVT ppx if HGB stable  Pain and Nausea control PRN  PT/OT: Return to previous environment with social support, Other (Comment)(HHPT and family support pending progress)      Subjective/Objective     Subjective: Patient had normal bowel movement around noon, no pain, nausea or vomiting, tolerated diet   Objective:   Blood pressure 147/84, pulse 89, temperature 98 2 °F (36 8 °C), resp  rate 16, height 5' 6" (1 676 m), weight 74 kg (163 lb 2 3 oz), SpO2 95 %  ,Body mass index is 26 33 kg/m²  I/O       01/23 0701 - 01/24 0700 01/24 0701 - 01/25 0700 01/25 0701 - 01/26 0700    P  O  1200 960     I V  (mL/kg) 1713 8 (23 2) 1800 (24 3)     IV Piggyback 450 450     Total Intake(mL/kg) 3363 8 (45 5) 3210 (43 4)     Urine (mL/kg/hr) 1950 (1 1) 2775 (1 6)     Drains 210 105     Stool 0 0     Total Output 2160 2880     Net +1203 8 +330            Unmeasured Urine Occurrence  1 x     Unmeasured Stool Occurrence 0 x 2 x             Invasive Devices     Peripheral Intravenous Line            Peripheral IV 01/25/21 Left Wrist less than 1 day          Drain            Closed/Suction Drain Left Abdomen Bulb 4 days    Closed/Suction Drain Right Abdomen Bulb 4 days                Physical Exam:   Gen: NAD, Comfortable  Neuro: A&O, No focal deficits  Head: Normal Cephalic, Atraumatic  Eye: EOMI, PERRLA, No scleral icterus  Neck: Supple, No JVD, Midline trachea  CV: RRR, Cap refill <2 sec  Pulm: Normal work of breathing, no respiratory distress  Abd: Soft, Non-Distended, Non-Tender, incision cdi, L MARÍA 20 serous, R MARÍA 65 serous  Ext: No edema, Non-tender  Skin: warm, dry, intact     Lab, Imaging and other studies:  Recent Labs     01/24/21  0527 01/24/21  1739 01/25/21  0843 01/26/21  0605   WBC 9 14  --  8 33 7 78   HGB 9 4* 9 9* 9 7* 10 0*     --  375 396*     Recent Labs     01/24/21  0527 01/24/21  1726 01/25/21  0840   SODIUM 138 138 140   K 3 4* 3 7 3 8    106 107   CO2 25 23 25   BUN 7 6 5   CREATININE 0 84 0 84 0 77   CALCIUM 8 1* 8 5 8 7           VTE Pharmacologic Prophylaxis: Sequential compression device (Venodyne)   VTE Mechanical Prophylaxis: sequential compression device

## 2021-01-26 NOTE — ASSESSMENT & PLAN NOTE
· Secondary to microperforation  · Patient underwent EGD and colonoscopy on 1/20/2021  Subsequently she developed abdominal pain  · KUB was performed which was noted to show free air under the diaphragm bilaterally  · Patient was taken to the OR on 1/21/2021  There was no evidence of air leak with insufflation  Suspected to have micro perforation which had resolved  · Two MARÍA drains removed  · Tolerating diet  · Will need outpatient surgical follow up in 2 weeks

## 2021-01-26 NOTE — DISCHARGE SUMMARY
[address] - Internal Medicine Service  Discharge- Al Strange 1950, 65 y o  female MRN: [id number]  Unit/Bed#: W -01 Encounter: [id number]  Primary Care Provider: Bryant Gray DO   Date and time admitted to hospital: 1/19/2021 12:21 PM    History of GI diverticular bleed  Assessment & Plan   CT scan on admission with no acute findings to account for GI bleeding  No colitis or abscess noted  She has stable postsurgical findings   S/P EGD and colonoscopy 1/20 - likely self limited diverticular bleed  Old blood noted and poor prep   EGD with moderate gastritis and candida esophagitis  o Continue PPI   Hemoglobin stable and has not required any transfusions     Lab Results   Component Value Date    HGB 10 0 (L) 01/26/2021    HGB 9 7 (L) 01/25/2021    HGB 9 9 (L) 01/24/2021    HGB 9 4 (L) 01/24/2021       * Pneumoperitoneum-resolved as of 1/27/2021  Assessment & Plan  · Secondary to microperforation  · Patient underwent EGD and colonoscopy on 1/20/2021  Subsequently she developed abdominal pain  · KUB was performed which was noted to show free air under the diaphragm bilaterally  · Patient was taken to the OR on 1/21/2021  There was no evidence of air leak with insufflation  Suspected to have micro perforation which had resolved  · Two MARÍA drains removed  · Tolerating diet  · Will need outpatient surgical follow up in 2 weeks  C  difficile diarrhea  Assessment & Plan  · Noted to have PCR positive but EIA  · Given no prior history and evidence of diarrhea, started on vancomycin  · Continue without patient vancomycin for a total of 14 days  · Continue through to 2/3/21     H/O hernia repair  Assessment & Plan  · History of hernia repair with Dr Carroll Garcia on 1/4/2021  · She was seen in her office day prior to admission and subsequently sent into the emergency department with more episodes of diarrhea    · Feel that her acute GI bleeding is not related to surgery, and merely coincidental   · CT revealing stable postsurgical findings  · Outpatient follow-up as directed with surgery team    Hyperlipidemia  Assessment & Plan  · Continue statin  Leukocytosis-resolved as of 1/26/2021  Assessment & Plan  · WBC 14 POD1 and now resolved  · Likely reactive from surgery and micro perforation  · No findings to suggest peritonitis  Discharging Physician / Practitioner: Olga Leroy PA-C  PCP: Yvonne Damon DO  Admission Date:   Admission Orders (From admission, onward)     Ordered        01/19/21 1553  Inpatient Admission  Once         01/19/21 1537  Place in Observation  Once                   Discharge Date: 01/27/21    Resolved Problems  Date Reviewed: 1/26/2021          Resolved    * (Principal) Pneumoperitoneum 1/27/2021     Resolved by  Olga Leroy PA-C    Leukocytosis 1/26/2021     Resolved by  Olga Leroy PA-C          Consultations During Hospital Stay:  · General Surgery  · GI  · PT/OT    Procedures Performed:   · Colonoscopy  ·     Significant Findings / Test Results:   · CT scan of the abdomen and pelvis upon admission with diverticulosis, no evidence of diverticulitis  She had small amount of pneumoperitoneum from recent laparoscopic surgery but no evidence of abscess  · X-ray of the abdomen with increased pneumoperitoneum  · C  difficile PCR positive, toxin negative  · Hemoglobin stable upon discharge, 9 4   · Mild hypokalemia during admission, now resolved upon discharge  · Colonoscopy:  No acute findings  Inadequate prep  Incidental Findings:   · None      Test Results Pending at Discharge (will require follow up): · None      Outpatient Tests Requested:  · None     Complications:  Unfortunately after colonoscopy the patient developed abdominal distention and pneumoperitoneum, undergoing exploratory laparotomy with no findings of acute perforation      Reason for Admission: rectal bleeding, diarrhea    Hospital Course:     Jammie Vickers is a 65 y o  female patient who originally presented to the hospital on 1/19/2021 due to lower GI bleeding and diarrhea  The patient reported she had multiple episodes of diarrhea with bright red blood 3 days prior to admission  The patient was admitted with hemoglobin of 10 7, which is at her baseline  She underwent C diff testing for which PCR was positive, however negative for any toxins  The patient was seen in consultation by Gastroenterology, and colonoscopy was performed revealing no acute findings, and no evidence of bleeding  Shortly after the patient's colonoscopy, she developed increased abdominal pain as well as distension  The patient underwent an x-ray of his abdomen revealing increased intra-abdominal air, and was seen by General surgery, who performed exploratory laparotomy  There is no evidence of any perforation or abscess  Suspecting micro perforation as the cause  Two MARÍA drains were placed, and had good output which decreased daily with no evidence of blood or infection  The patient was seen daily, and had slow improvement of her bowel function with no more evidence of bleeding  Her diarrhea has been resolving  Her MARÍA drains were pulled prior to discharge, and she will follow-up in 2 weeks with General surgery  She was discharged on oral vancomycin for C diff, for which she will complete 7 additional days of therapy  She is stable for discharge home at this time, and is refusing any home services such as home PT or OT  Please see above list of diagnoses and related plan for additional information  Condition at Discharge: good     Discharge Day Visit / Exam:     Subjective:  Patient is tolerating diet  Having bowel movements with no bleeding  Reports her pain is controlled  Denies any drainage from her wound  No events overnight reported by nursing  She is anxious to go home today    Vitals: Blood Pressure: 127/76 (01/27/21 0658)  Pulse: 100 (01/27/21 0658)  Temperature: 98 °F (36 7 °C) (01/27/21 0658)  Temp Source: Oral (01/26/21 2216)  Respirations: 13 (01/27/21 0658)  Height: 5' 6" (167 6 cm) (01/19/21 1839)  Weight - Scale: 74 kg (163 lb 2 3 oz) (01/19/21 1839)  SpO2: 95 % (01/27/21 2380)  Exam:   Physical Exam  Vitals signs and nursing note reviewed  Constitutional:       General: She is not in acute distress  Appearance: Normal appearance  HENT:      Head: Normocephalic  Mouth/Throat:      Mouth: Mucous membranes are moist    Eyes:      General: No scleral icterus  Pupils: Pupils are equal, round, and reactive to light  Cardiovascular:      Rate and Rhythm: Normal rate and regular rhythm  Heart sounds: No murmur  Pulmonary:      Effort: Pulmonary effort is normal  No respiratory distress  Breath sounds: Normal breath sounds  No wheezing, rhonchi or rales  Abdominal:      General: Bowel sounds are normal  There is no distension  Palpations: Abdomen is soft  Tenderness: There is no abdominal tenderness  Comments: Midline laparotomy incision clean dry and intact, with staples in place  Two MARÍA drains in bilateral lower abdominal quadrants with minimal drainage  Musculoskeletal:         General: No swelling  Right lower leg: No edema  Left lower leg: No edema  Skin:     Capillary Refill: Capillary refill takes less than 2 seconds  Neurological:      General: No focal deficit present  Mental Status: She is alert and oriented to person, place, and time  Mental status is at baseline  Discussion with Family: Called   Discharge instructions/Information to patient and family:   See after visit summary for information provided to patient and family  Provisions for Follow-Up Care:  See after visit summary for information related to follow-up care and any pertinent home health orders        Disposition:     Home    For Discharges to [address] [facility]:   · Not Applicable to this Patient - Not Applicable to this Patient    Planned Readmission: none     Discharge Statement:  I spent 35 minutes discharging the patient  This time was spent on the day of discharge  I had direct contact with the patient on the day of discharge  Greater than 50% of the total time was spent examining patient, answering all patient questions, arranging and discussing plan of care with patient as well as directly providing post-discharge instructions  Additional time then spent on discharge activities  Discharge Medications:  See after visit summary for reconciled discharge medications provided to patient and family        ** Please Note: This note has been constructed using a voice recognition system **

## 2021-01-26 NOTE — PROGRESS NOTES
Progress Note - General Surgery   Mary Lackey 65 y o  female MRN: [id number]  Unit/Bed#: W -01 Encounter: [id number]    Assessment:  65 F presenting with GI bleed s/p endoscopy on 1/20 w/ subsequent findings of pneumoperitoneum  S/p ex lap with drain placement on 1/21  MARÍA L: 60 from 30 cc SS  MARÍA R: 75 from 75 cc SS    Plan:  D/C per primary  - will discuss drain removal with attending  Diet Surgical; Soft  Continue antibiotics  OOBTC, Ambulate TID  Pulmonary Toilet, IS  PPx: PPI, consider restarting chemical DVT ppx if HGB stable  Pain and Nausea control PRN  PT/OT: Return to previous environment with social support, Other (Comment)(HHPT and family support pending progress)    Subjective/Objective     Subjective:   No acute events overnight  Has had nausea but states she thinks it is related to GERD and medications rather than food    Tolerating soft diet  BM+ (non bloody)  Flatus+  Patient has been OOB  Pertinent review of systems as above  All other review of systems negative  Objective:    Blood pressure 123/76, pulse 89, temperature 98 4 °F (36 9 °C), temperature source Oral, resp  rate 16, height 5' 6" (1 676 m), weight 74 kg (163 lb 2 3 oz), SpO2 94 %  ,Body mass index is 26 33 kg/m²  Intake/Output Summary (Last 24 hours) at 1/27/2021 0631  Last data filed at 1/27/2021 0501  Gross per 24 hour   Intake 725 83 ml   Output 1585 ml   Net -859 17 ml       Invasive Devices     Peripheral Intravenous Line            Peripheral IV 01/25/21 Left Wrist 1 day          Drain            Closed/Suction Drain Left Abdomen Bulb 5 days    Closed/Suction Drain Right Abdomen Bulb 5 days                Physical Exam:   Gen:  NAD  HEENT: NCAT  MMM  CV: well perfused, pulses palpable  Lungs: Normal respiratory effort  Abd: soft, nt/nd, incisions cdi  Drains intact  Skin: warm/ dry  Extremities: no peripheral edema, no clubbing or cyanosis  Neuro:  AxO x3      Results from last 7 days   Lab Units 01/27/21  9505 01/26/21  0605 01/25/21  0843   WBC Thousand/uL 7 21 7 78 8 33   HEMOGLOBIN g/dL 9 4* 10 0* 9 7*   HEMATOCRIT % 30 2* 31 6* 30 3*   PLATELETS Thousands/uL 392* 396* 375     Results from last 7 days   Lab Units 01/27/21  0439 01/26/21  0605 01/25/21  0840   POTASSIUM mmol/L 3 8 4 0 3 8   CHLORIDE mmol/L 105 106 107   CO2 mmol/L 25 27 25   BUN mg/dL 8 4* 5   CREATININE mg/dL 0 78 0 84 0 77   CALCIUM mg/dL 8 5 8 6 8 7     Results from last 7 days   Lab Units 01/24/21  1739   INR  1 01   PTT seconds 32        I have personally reviewed pertinent films in PACS      Medications:   Scheduled Meds:  Current Facility-Administered Medications   Medication Dose Route Frequency Provider Last Rate    acetaminophen  650 mg Oral Q6H PRN Ninfa Negrete PA-C      HYDROmorphone  0 5 mg Intravenous Q3H PRN Ninfa Negrete PA-C      ondansetron  4 mg Intravenous Q6H PRN Mo Washington MD      pantoprazole  40 mg Oral BID AC Mo Washington MD      simethicone  80 mg Oral Q6H PRN Estuardo Dillon MD      traMADol  50 mg Oral Q4H PRN Mary Beth Negrete PA-C      vancomycin  125 mg Oral Q6H [address] Mo Washington MD       Continuous Infusions:   PRN Meds:  acetaminophen, 650 mg, Q6H PRN  HYDROmorphone, 0 5 mg, Q3H PRN  ondansetron, 4 mg, Q6H PRN  simethicone, 80 mg, Q6H PRN  traMADol, 50 mg, Q4H PRN      VTE Pharmacologic Prophylaxis: held  VTE Mechanical Prophylaxis: sequential compression device

## 2021-01-26 NOTE — APP STUDENT NOTE
Assessment/Plan:  1  History of GI diverticular bleed   -Upon admission, c/o bloody diarrhea    -CT: "Diverticulosis without evidence of acute diverticulitis "   -EGD: "Possible Candida esophagitis  Moderate gastritis  Gastric polyps, likely fundic gland "   -Hemoglobin stable at 10 0    2  C  Difficile diarrhea    -Positive PCR, negative EIA   -Continue Vancomycin until 2/3/21    3  Leukocytosis    -WBC elevated at 14 on 21 s/p colonoscopy and exploratory lap   -Had full course of Ancef and Flagyl -- d/c yesterday     4  Hyperlipidemia    -Continue Statin     5  Pneumoperitoneum    -Most likely due to microperforation s/p coloscopy    -KUB performed on  that showed free air under the diaphragm    -Exploratory lap performed on  -- 2 MARÍA drains placed    -GI following    -Continue tramadol prn for pain     6  H/O Hernia Repair    -Hernia repair on 21 with Dr Kowalski Counts               -Outpatient follow up     VTE Pharmacologic Prophylaxis:   Pharmacologic: Pharmacologic VTE Prophylaxis contraindicated due to GI Bleed  Mechanical VTE Prophylaxis in Place: Yes    Patient Centered Rounds: I have performed bedside rounds with nursing staff today  Discussions with Specialists or Other Care Team Provider:   Education and Discussions with Family / Patient: patient     Time Spent for Care: 30 minutes  More than 50% of total time spent on counseling and coordination of care as described above  Current Length of Stay: 7 day(s)    Current Patient Status: Inpatient   Certification Statement: The patient, who was admitted as an inpatient, is being discharged sooner than originally anticipated due to: hemoglobin stable    Discharge Plan: DC pending clearance by surgery     Code Status: Level 1 - Full Code      Subjective: The patient reports no issues overnight  She is tolerating eating and drinking  She denies bloody BM  Denies dizziness, nausea, vomiting       Objective:     Vitals:   Temp (24hrs), Av 4 °F (36 9 °C), Min:98 2 °F (36 8 °C), Max:98 5 °F (36 9 °C)    Temp:  [98 2 °F (36 8 °C)-98 5 °F (36 9 °C)] 98 2 °F (36 8 °C)  HR:  [] 89  Resp:  [16] 16  BP: (147-148)/(84-92) 147/84  SpO2:  [94 %-95 %] 95 %  Body mass index is 26 33 kg/m²  Input and Output Summary (last 24 hours): Intake/Output Summary (Last 24 hours) at 1/26/2021 1005  Last data filed at 1/26/2021 0843  Gross per 24 hour   Intake 1426 66 ml   Output 885 ml   Net 541 66 ml       Physical Exam:     Physical Exam  Constitutional:       General: She is not in acute distress  Appearance: She is not toxic-appearing  HENT:      Head: Normocephalic and atraumatic  Cardiovascular:      Rate and Rhythm: Normal rate and regular rhythm  Heart sounds: No murmur  No friction rub  No gallop  Pulmonary:      Effort: Pulmonary effort is normal       Breath sounds: No wheezing or rales  Abdominal:      General: Bowel sounds are normal       Tenderness: There is no abdominal tenderness  There is no guarding  Musculoskeletal:      Right lower leg: No edema  Left lower leg: No edema  Skin:     General: Skin is warm  Findings: No bruising or erythema  Neurological:      General: No focal deficit present  Mental Status: She is alert             Additional Data:     Labs:    Results from last 7 days   Lab Units 01/26/21  0605 01/25/21  0843   WBC Thousand/uL 7 78 8 33   HEMOGLOBIN g/dL 10 0* 9 7*   HEMATOCRIT % 31 6* 30 3*   PLATELETS Thousands/uL 396* 375   NEUTROS PCT %  --  77*   LYMPHS PCT %  --  11*   MONOS PCT %  --  5   EOS PCT %  --  6     Results from last 7 days   Lab Units 01/26/21  0605  01/24/21  1726   SODIUM mmol/L 139   < > 138   POTASSIUM mmol/L 4 0   < > 3 7   CHLORIDE mmol/L 106   < > 106   CO2 mmol/L 27   < > 23   BUN mg/dL 4*   < > 6   CREATININE mg/dL 0 84   < > 0 84   ANION GAP mmol/L 6   < > 9   CALCIUM mg/dL 8 6   < > 8 5   ALBUMIN g/dL  --   --  2 5*   TOTAL BILIRUBIN mg/dL  --   --  0 31 ALK PHOS U/L  --   --  56   ALT U/L  --   --  13   AST U/L  --   --  15   GLUCOSE RANDOM mg/dL 117   < > 116    < > = values in this interval not displayed  Results from last 7 days   Lab Units 01/24/21  1739   INR  1 01             Results from last 7 days   Lab Units 01/24/21  1739 01/19/21  1253   LACTIC ACID mmol/L 1 5 1 7           * I Have Reviewed All Lab Data Listed Above  * Additional Pertinent Lab Tests Reviewed: [facility] Admission Reviewed    Imaging:    Imaging Reports Reviewed Today Include:   Imaging Personally Reviewed by Myself Includes:      Recent Cultures (last 7 days):     Results from last 7 days   Lab Units 01/20/21  0001   C DIFF TOXIN B BY PCR  Positive*       Last 24 Hours Medication List:   Current Facility-Administered Medications   Medication Dose Route Frequency Provider Last Rate    acetaminophen  650 mg Oral Q6H PRN Ninfa Negrete PA-C      HYDROmorphone  0 5 mg Intravenous Q3H PRN Ninfa Negrete PA-C      ondansetron  4 mg Intravenous Q6H PRN Rhonda Kraus MD      pantoprazole  40 mg Oral BID AC Rhonda Kraus MD      simethicone  80 mg Oral Q6H PRN Zan Groves MD      traMADol  50 mg Oral Q4H PRN Jacqui Can PA-C      vancomycin  125 mg Oral Q6H Lee Ann Morrison MD          Today, Patient Was Seen By: Kosta Hunt    ** Please Note: Dictation voice to text software may have been used in the creation of this document   **

## 2021-01-26 NOTE — ASSESSMENT & PLAN NOTE
· Secondary to microperforation  · Patient underwent EGD and colonoscopy on 1/20/2021  Subsequently she developed abdominal pain  · KUB was performed which was noted to show free air under the diaphragm bilaterally  · Patient was taken to the OR on 1/21/2021  There was no evidence of air leak with insufflation  Suspected to have micro perforation which had resolved  · Two MARÍA drains were placed and continue with minimal output at this time  · Trial soft surgical diet today and advance as tolerated  · Continue tramadol p r n  Pain  · Likely discharge with surgical follow up tomorrow

## 2021-01-27 VITALS
WEIGHT: 163.14 LBS | RESPIRATION RATE: 13 BRPM | DIASTOLIC BLOOD PRESSURE: 76 MMHG | OXYGEN SATURATION: 95 % | HEIGHT: 66 IN | TEMPERATURE: 98 F | BODY MASS INDEX: 26.22 KG/M2 | SYSTOLIC BLOOD PRESSURE: 127 MMHG | HEART RATE: 100 BPM

## 2021-01-27 PROBLEM — K66.8 PNEUMOPERITONEUM: Status: RESOLVED | Noted: 2021-01-21 | Resolved: 2021-01-27

## 2021-01-27 LAB
ANION GAP SERPL CALCULATED.3IONS-SCNC: 9 MMOL/L (ref 4–13)
BUN SERPL-MCNC: 8 MG/DL (ref 5–25)
CALCIUM SERPL-MCNC: 8.5 MG/DL (ref 8.3–10.1)
CHLORIDE SERPL-SCNC: 105 MMOL/L (ref 100–108)
CO2 SERPL-SCNC: 25 MMOL/L (ref 21–32)
CREAT SERPL-MCNC: 0.78 MG/DL (ref 0.6–1.3)
ERYTHROCYTE [DISTWIDTH] IN BLOOD BY AUTOMATED COUNT: 14.5 % (ref 11.6–15.1)
GFR SERPL CREATININE-BSD FRML MDRD: 76 ML/MIN/1.73SQ M
GLUCOSE SERPL-MCNC: 109 MG/DL (ref 65–140)
HCT VFR BLD AUTO: 30.2 % (ref 34.8–46.1)
HGB BLD-MCNC: 9.4 G/DL (ref 11.5–15.4)
MCH RBC QN AUTO: 29.5 PG (ref 26.8–34.3)
MCHC RBC AUTO-ENTMCNC: 31.1 G/DL (ref 31.4–37.4)
MCV RBC AUTO: 95 FL (ref 82–98)
PLATELET # BLD AUTO: 392 THOUSANDS/UL (ref 149–390)
PMV BLD AUTO: 9.1 FL (ref 8.9–12.7)
POTASSIUM SERPL-SCNC: 3.8 MMOL/L (ref 3.5–5.3)
RBC # BLD AUTO: 3.19 MILLION/UL (ref 3.81–5.12)
SODIUM SERPL-SCNC: 139 MMOL/L (ref 136–145)
WBC # BLD AUTO: 7.21 THOUSAND/UL (ref 4.31–10.16)

## 2021-01-27 PROCEDURE — 99024 POSTOP FOLLOW-UP VISIT: CPT | Performed by: SURGERY

## 2021-01-27 PROCEDURE — 80048 BASIC METABOLIC PNL TOTAL CA: CPT | Performed by: PHYSICIAN ASSISTANT

## 2021-01-27 PROCEDURE — 85027 COMPLETE CBC AUTOMATED: CPT | Performed by: PHYSICIAN ASSISTANT

## 2021-01-27 PROCEDURE — 99239 HOSP IP/OBS DSCHRG MGMT >30: CPT | Performed by: PHYSICIAN ASSISTANT

## 2021-01-27 RX ORDER — PANTOPRAZOLE SODIUM 40 MG/1
40 TABLET, DELAYED RELEASE ORAL 2 TIMES DAILY
Qty: 30 TABLET | Refills: 0 | Status: SHIPPED | OUTPATIENT
Start: 2021-01-27 | End: 2021-02-02

## 2021-01-27 RX ORDER — ONDANSETRON 4 MG/1
4 TABLET, ORALLY DISINTEGRATING ORAL EVERY 6 HOURS PRN
Qty: 20 TABLET | Refills: 0 | Status: SHIPPED | OUTPATIENT
Start: 2021-01-27

## 2021-01-27 RX ADMIN — Medication 125 MG: at 06:00

## 2021-01-27 RX ADMIN — PANTOPRAZOLE SODIUM 40 MG: 40 TABLET, DELAYED RELEASE ORAL at 06:00

## 2021-01-27 RX ADMIN — Medication 125 MG: at 11:59

## 2021-01-27 RX ADMIN — Medication 125 MG: at 00:32

## 2021-01-27 NOTE — QUICK NOTE
MARÍA drains removed x2  Patient tolerated well  DSD applied    OK for discharge from surgical standpoint  No antibiotics required from surgical standpoint  D Lou PAC from [facility] notified

## 2021-01-27 NOTE — APP STUDENT NOTE
Assessment/Plan:  1  History of GI Diverticular bleed   -Upon admission, c/o bloody diarrhea    -CT: "Diverticulosis without evidence of acute diverticulitis "   -EGD: "Possible Candida esophagitis  Moderate gastritis  Gastric polyps, likely fundic gland "   -Continue PPI    -Hemoglobin 9 4 -- no complaints of dizziness or lightheadedness, no hematochezia    -Outpatient follow up with PCP     2  C  Difficile diarrhea    -Positive PCR, negative EIA   -Continue Vancomycin until 2/3/21    3  Pneumoperitoneum   -Most likely due to microperforation s/p colonoscopy    -KUB performed on 1/19 that showed free air under the diaphragm    -Exploratory lap performed on 1/21 -- 2 MARÍA drain placed, will be taken out today by surgery    -Continue tramadol prn for pain      4  Hyperlipidemia    -Continue statin     5  H/O Hernia Repair    -Hernia repair on 1/4/21 with Dr Xenia Kirkland   -Outpatient follow up     6  Leukocytosis    -WBC 14 on 1/22/21 s/p colonoscopy and exploratory lap    -Had full course of Ancef and Flagyl -- d/c on 1/25/21    -Leukocytosis resolved with WBC 7 21 this am     VTE Pharmacologic Prophylaxis:   Pharmacologic: Pharmacologic VTE Prophylaxis contraindicated due to GI bleed  Mechanical VTE Prophylaxis in Place: Yes    Patient Centered Rounds: I have performed bedside rounds with nursing staff today  Discussions with Specialists or Other Care Team Provider:     Education and Discussions with Family / Patient: patient     Time Spent for Care: 20 minutes  More than 50% of total time spent on counseling and coordination of care as described above      Current Length of Stay: 8 day(s)    Current Patient Status: Inpatient   Certification Statement: The patient, who was admitted as an inpatient, is being discharged sooner than originally anticipated due to: cleared by surgery team    Discharge Plan: DC today -- cleared by surgical team and tolerating regular diet     Code Status: Level 1 - Full Code      Subjective: The patient reports no issues overnight  She is peeing and having BM; denies hematochezia  She is eating a regular diet and is reporting mild nausea but no vomiting  The patient has mild abdominal pain  Denies SOB, chest pain, dizziness  Objective:     Vitals:   Temp (24hrs), Av °F (36 7 °C), Min:97 7 °F (36 5 °C), Max:98 4 °F (36 9 °C)    Temp:  [97 7 °F (36 5 °C)-98 4 °F (36 9 °C)] 98 °F (36 7 °C)  HR:  [] 100  Resp:  [13-16] 13  BP: (107-147)/(76-85) 127/76  SpO2:  [92 %-96 %] 95 %  Body mass index is 26 33 kg/m²  Input and Output Summary (last 24 hours): Intake/Output Summary (Last 24 hours) at 2021 0850  Last data filed at 2021 0501  Gross per 24 hour   Intake 480 ml   Output 1185 ml   Net -705 ml       Physical Exam:     Physical Exam  Constitutional:       General: She is not in acute distress  Appearance: She is not toxic-appearing  HENT:      Head: Normocephalic and atraumatic  Cardiovascular:      Rate and Rhythm: Normal rate and regular rhythm  Heart sounds: No murmur  No friction rub  No gallop  Pulmonary:      Effort: Pulmonary effort is normal       Breath sounds: No wheezing, rhonchi or rales  Abdominal:      General: Bowel sounds are normal       Tenderness: There is abdominal tenderness  There is no guarding  Musculoskeletal:      Right lower leg: No edema  Left lower leg: No edema  Skin:     General: Skin is warm  Findings: No bruising or erythema  Neurological:      General: No focal deficit present  Mental Status: She is alert             Additional Data:     Labs:    Results from last 7 days   Lab Units 21  0439  21  0843   WBC Thousand/uL 7 21   < > 8 33   HEMOGLOBIN g/dL 9 4*   < > 9 7*   HEMATOCRIT % 30 2*   < > 30 3*   PLATELETS Thousands/uL 392*   < > 375   NEUTROS PCT %  --   --  77*   LYMPHS PCT %  --   --  11*   MONOS PCT %  --   --  5   EOS PCT %  --   --  6    < > = values in this interval not displayed  Results from last 7 days   Lab Units 01/27/21  0439  01/24/21  1726   SODIUM mmol/L 139   < > 138   POTASSIUM mmol/L 3 8   < > 3 7   CHLORIDE mmol/L 105   < > 106   CO2 mmol/L 25   < > 23   BUN mg/dL 8   < > 6   CREATININE mg/dL 0 78   < > 0 84   ANION GAP mmol/L 9   < > 9   CALCIUM mg/dL 8 5   < > 8 5   ALBUMIN g/dL  --   --  2 5*   TOTAL BILIRUBIN mg/dL  --   --  0 31   ALK PHOS U/L  --   --  56   ALT U/L  --   --  13   AST U/L  --   --  15   GLUCOSE RANDOM mg/dL 109   < > 116    < > = values in this interval not displayed  Results from last 7 days   Lab Units 01/24/21  1739   INR  1 01             Results from last 7 days   Lab Units 01/24/21  1739   LACTIC ACID mmol/L 1 5           * I Have Reviewed All Lab Data Listed Above  * Additional Pertinent Lab Tests Reviewed: [facility] Ad[facility]    Imaging:    Imaging Reports Reviewed Today Include:   Imaging Personally Reviewed by Myself Includes:      Recent Cultures (last 7 days):           Last 24 Hours Medication List:   Current Facility-Administered Medications   Medication Dose Route Frequency Provider Last Rate    acetaminophen  650 mg Oral Q6H PRN Ninfa Negrete PA-C      HYDROmorphone  0 5 mg Intravenous Q3H PRN Ninfa Negrete PA-C      ondansetron  4 mg Intravenous Q6H PRN Melissa Tobar MD      pantoprazole  40 mg Oral BID AC Melissa Tobar MD      simethicone  80 mg Oral Q6H PRN Karyle Brawn, MD      traMADol  50 mg Oral Q4H PRN Poncho Harris PA-C      vancomycin  125 mg Oral Q6H Joshua Lee MD          Today, Patient Was Seen By: Gita To    ** Please Note: Dictation voice to text software may have been used in the creation of this document   **

## 2021-01-27 NOTE — DISCHARGE INSTR - AVS FIRST PAGE
Dear Lalit Hirsch,     It was our pleasure to care for you here at [facility], [address]  It is our hope that we were always able to exceed the expected standards for your care during your stay  You were hospitalized due to diarrhea and abdominal pain  You were cared for on the 4th floor by Agnieszka Childs PA-C under the service of Edna Zavala MD with the [facility] who covers for your primary care physician (PCP), Jan Yeung DO, while you were hospitalized  If you have any questions or concerns related to this hospitalization, you may contact us at [phone]  For follow up as well as any medication refills, we recommend that you follow up with your primary care physician  A registered nurse will reach out to you by phone within a few days after your discharge to answer any additional questions that you may have after going home  However, at this time we provide for you here, the most important instructions / recommendations at discharge:     · Notable Medication Adjustments -   · Continue to take vancomycin until February 3rd  Take this medication 4 times per day  · Testing Required after Discharge -   · No testing is required after your discharge  · Important follow up information -   · Follow-up with the surgeon in 2 weeks to have your staples removed  · Other Instructions -   · Please note the discharge instructions written by surgery in regards to caring for your incision  · Please review this entire after visit summary as additional general instructions including medication list, appointments, activity, diet, any pertinent wound care, and other additional recommendations from your care team that may be provided for you        Sincerely,     Agnieszka Childs PA-C

## 2021-01-27 NOTE — DISCHARGE INSTRUCTIONS
OK to shower  Pat incision dry  No hot tubs or pools  Change drain site dressing daily if needed  No lifting >10 pounds     Call with fever, chills, increased pain, redness

## 2021-01-27 NOTE — CASE MANAGEMENT
CM spoke with patient at the bedside  CM introduced self and role  CM reviewed with patient at the bedside re:IMM  Patient provided copy at the bedside  Patient understands her Medicare rights  Patient stated her  is able to transport at discharge  No other needs/concerns noted at this time  IMM reviewed with patient   patient agree with discharge determination

## 2021-02-02 DIAGNOSIS — K21.9 GASTROESOPHAGEAL REFLUX DISEASE, UNSPECIFIED WHETHER ESOPHAGITIS PRESENT: ICD-10-CM

## 2021-02-02 RX ORDER — PANTOPRAZOLE SODIUM 40 MG/1
TABLET, DELAYED RELEASE ORAL
Qty: 30 TABLET | Refills: 0 | Status: SHIPPED | OUTPATIENT
Start: 2021-02-02 | End: 2021-03-12 | Stop reason: SDUPTHER

## 2021-02-09 ENCOUNTER — OFFICE VISIT (OUTPATIENT)
Dept: SURGERY | Facility: CLINIC | Age: 71
End: 2021-02-09

## 2021-02-09 DIAGNOSIS — Z48.89 POSTOPERATIVE VISIT: Primary | ICD-10-CM

## 2021-02-09 PROCEDURE — 99024 POSTOP FOLLOW-UP VISIT: CPT | Performed by: SURGERY

## 2021-02-09 NOTE — PROGRESS NOTES
Assessment/Plan:  Patient is status post exploratory laparotomy for micro perforation  Patient had free air after colonoscopy associated with abdominal pain  Air was noted in the   Hepatic flexure, however there was no perforation the could be located  The region was drained  Her postoperative course was unremarkable  She returns today for follow-up visit  She feels well  She has no complaints  Her appetite is good  Bowel movements are regular  Incision is clean and healing nicely  I have not ask for follow-up visits as she is doing well  All questions were answered  Diagnoses and all orders for this visit:    Postoperative visit        Pathology: None sent      Postoperative restrictions reviewed  All questions answered  ______________________________________________________  HPI: Patient presents post operatively  Laparotomy exploratory with repair of umbilical hernia 6/70/6194  ROS:  General ROS: negative for - chills, fatigue, fever or night sweats, weight loss  Respiratory ROS: no cough, shortness of breath, or wheezing  Cardiovascular ROS: no chest pain or dyspnea on exertion  Genito-Urinary ROS: no dysuria, trouble voiding, or hematuria  Musculoskeletal ROS: negative for - gait disturbance, joint pain or muscle pain  Neurological ROS: no TIA or stroke symptoms  GI ROS: see HPI  Skin ROS: no new rashes or lesions   Lymphatic ROS: no new adenopathy noted by pt     GYN ROS: see HPI, no new GYN history or bleeding noted  Psy ROS: no new mental or behavioral disturbances         Patient Active Problem List   Diagnosis    Diverticulosis    Esophageal reflux    Hyperlipidemia    Osteopenia    Vitamin D deficiency    Prediabetes    Screening for colon cancer    Blood in stool    History of GI diverticular bleed    Diarrhea    H/O hernia repair    C  difficile diarrhea    Postoperative visit       Allergies:  Codeine      Current Outpatient Medications:    atorvastatin (LIPITOR) 10 mg tablet, Take 1 tablet (10 mg total) by mouth daily, Disp: 90 tablet, Rfl: 3    Calcium Carb-Cholecalciferol (CALCIUM 1000 + D) 1000-800 MG-UNIT TABS, Take by mouth, Disp: , Rfl:     cholecalciferol (VITAMIN D3) 1,000 units tablet, Take 3 tablets by mouth daily, Disp: , Rfl:     ondansetron (ZOFRAN-ODT) 4 mg disintegrating tablet, Take 1 tablet (4 mg total) by mouth every 6 (six) hours as needed for nausea or vomiting, Disp: 20 tablet, Rfl: 0    pantoprazole (PROTONIX) 40 mg tablet, TAKE 1 TABLET BY MOUTH DAILY BEFORE BREAKFAST, Disp: 30 tablet, Rfl: 0    Probiotic Product (PROBIOTIC-10) CAPS, Take by mouth, Disp: , Rfl:     Past Medical History:   Diagnosis Date    [facility]     last assessed 09Apr2014    Diverticulosis     Ear problems     HL (hearing loss)     Hyperlipemia     Tinnitus        Past Surgical History:   Procedure Laterality Date    BREAST BIOPSY Left     COLONOSCOPY      (Fiberoptic)    EXPLORATORY LAPAROTOMY W/ BOWEL RESECTION N/A 1/21/2021    Procedure: LAPAROTOMY EXPLORATORY W/ REPAIR OF UMBLICAL HERNIA;  Surgeon: Deepak Fitzgerald MD;  [facility];  Service: General    HYSTERECTOMY      [place] LAP,INGUINAL HERNIA REPR,INITIAL Right 1/6/2021    Procedure: ROBOTIC INGUINAL HERNIA REPAIR;  Surgeon: Dana Coley MD;  [facility];  Service: General       Family History   Problem Relation Age of Onset    Breast cancer Mother     Ovarian cancer Maternal Grandmother     Breast cancer Family     Thyroid disease Family     Breast cancer Paternal Aunt     Breast cancer Cousin         reports that she has never smoked  She has never used smokeless tobacco  She reports current alcohol use  She reports that she does not use drugs  PHYSICAL EXAM    There were no vitals taken for this visit      General: normal, cooperative, no distress  Abdominal: soft, nondistended or nontender  Incision: clean, dry, and intact and healing mitchell Jackson MD    Date: 2/9/2021 Time: 2:44 PM

## 2021-02-13 DIAGNOSIS — Z23 ENCOUNTER FOR IMMUNIZATION: ICD-10-CM

## 2021-02-28 ENCOUNTER — TELEPHONE (OUTPATIENT)
Dept: GASTROENTEROLOGY | Facility: CLINIC | Age: 71
End: 2021-02-28

## 2021-02-28 NOTE — TELEPHONE ENCOUNTER
LVM to call back and reschedule colonoscopy on 4/16 ASC with Dr Rebeca Wyman as she wont be doing procedures that day  Gave direct line to reschedule

## 2021-03-12 DIAGNOSIS — K21.9 GASTROESOPHAGEAL REFLUX DISEASE, UNSPECIFIED WHETHER ESOPHAGITIS PRESENT: ICD-10-CM

## 2021-03-12 RX ORDER — PANTOPRAZOLE SODIUM 40 MG/1
40 TABLET, DELAYED RELEASE ORAL
Qty: 30 TABLET | Refills: 3 | Status: SHIPPED | OUTPATIENT
Start: 2021-03-12 | End: 2021-06-13

## 2021-03-13 NOTE — TELEPHONE ENCOUNTER
Pt left message stating to cancel procedure and does not want to reschedule as she will be going back to her previous GI dr

## 2021-03-29 PROBLEM — Z86.010 HISTORY OF COLON POLYPS: Status: ACTIVE | Noted: 2021-03-29

## 2021-03-29 PROBLEM — Z86.0100 HISTORY OF COLON POLYPS: Status: ACTIVE | Noted: 2021-03-29

## 2021-04-19 ENCOUNTER — APPOINTMENT (OUTPATIENT)
Dept: LAB | Facility: CLINIC | Age: 71
End: 2021-04-19
Payer: MEDICARE

## 2021-04-19 ENCOUNTER — TRANSCRIBE ORDERS (OUTPATIENT)
Dept: LAB | Facility: CLINIC | Age: 71
End: 2021-04-19

## 2021-04-19 DIAGNOSIS — E03.9 ACQUIRED HYPOTHYROIDISM: ICD-10-CM

## 2021-04-19 DIAGNOSIS — E78.01 FAMILIAL HYPERCHOLESTEROLEMIA: ICD-10-CM

## 2021-04-19 DIAGNOSIS — E55.9 VITAMIN D DEFICIENCY: ICD-10-CM

## 2021-04-19 DIAGNOSIS — M85.89 OSTEOPENIA OF MULTIPLE SITES: ICD-10-CM

## 2021-04-19 LAB
ALBUMIN SERPL BCP-MCNC: 3.7 G/DL (ref 3.5–5)
ALP SERPL-CCNC: 79 U/L (ref 46–116)
ALT SERPL W P-5'-P-CCNC: 22 U/L (ref 12–78)
ANION GAP SERPL CALCULATED.3IONS-SCNC: 8 MMOL/L (ref 4–13)
AST SERPL W P-5'-P-CCNC: 17 U/L (ref 5–45)
BASOPHILS # BLD AUTO: 0.03 THOUSANDS/ΜL (ref 0–0.1)
BASOPHILS NFR BLD AUTO: 1 % (ref 0–1)
BILIRUB SERPL-MCNC: 0.53 MG/DL (ref 0.2–1)
BUN SERPL-MCNC: 17 MG/DL (ref 5–25)
CALCIUM SERPL-MCNC: 9.5 MG/DL (ref 8.3–10.1)
CHLORIDE SERPL-SCNC: 104 MMOL/L (ref 100–108)
CHOLEST SERPL-MCNC: 170 MG/DL (ref 50–200)
CO2 SERPL-SCNC: 28 MMOL/L (ref 21–32)
CREAT SERPL-MCNC: 0.87 MG/DL (ref 0.6–1.3)
EOSINOPHIL # BLD AUTO: 0.19 THOUSAND/ΜL (ref 0–0.61)
EOSINOPHIL NFR BLD AUTO: 4 % (ref 0–6)
ERYTHROCYTE [DISTWIDTH] IN BLOOD BY AUTOMATED COUNT: 14.7 % (ref 11.6–15.1)
GFR SERPL CREATININE-BSD FRML MDRD: 67 ML/MIN/1.73SQ M
GLUCOSE P FAST SERPL-MCNC: 100 MG/DL (ref 65–99)
HCT VFR BLD AUTO: 44.8 % (ref 34.8–46.1)
HDLC SERPL-MCNC: 62 MG/DL
HGB BLD-MCNC: 13.8 G/DL (ref 11.5–15.4)
IMM GRANULOCYTES # BLD AUTO: 0.01 THOUSAND/UL (ref 0–0.2)
IMM GRANULOCYTES NFR BLD AUTO: 0 % (ref 0–2)
LDLC SERPL CALC-MCNC: 95 MG/DL (ref 0–100)
LDLC SERPL DIRECT ASSAY-MCNC: 93 MG/DL (ref 0–100)
LYMPHOCYTES # BLD AUTO: 1.25 THOUSANDS/ΜL (ref 0.6–4.47)
LYMPHOCYTES NFR BLD AUTO: 29 % (ref 14–44)
MCH RBC QN AUTO: 27 PG (ref 26.8–34.3)
MCHC RBC AUTO-ENTMCNC: 30.8 G/DL (ref 31.4–37.4)
MCV RBC AUTO: 88 FL (ref 82–98)
MONOCYTES # BLD AUTO: 0.32 THOUSAND/ΜL (ref 0.17–1.22)
MONOCYTES NFR BLD AUTO: 8 % (ref 4–12)
NEUTROPHILS # BLD AUTO: 2.48 THOUSANDS/ΜL (ref 1.85–7.62)
NEUTS SEG NFR BLD AUTO: 58 % (ref 43–75)
NONHDLC SERPL-MCNC: 108 MG/DL
NRBC BLD AUTO-RTO: 0 /100 WBCS
PLATELET # BLD AUTO: 327 THOUSANDS/UL (ref 149–390)
PMV BLD AUTO: 9.4 FL (ref 8.9–12.7)
POTASSIUM SERPL-SCNC: 4.4 MMOL/L (ref 3.5–5.3)
PROT SERPL-MCNC: 7.4 G/DL (ref 6.4–8.2)
RBC # BLD AUTO: 5.11 MILLION/UL (ref 3.81–5.12)
SODIUM SERPL-SCNC: 140 MMOL/L (ref 136–145)
TRIGL SERPL-MCNC: 67 MG/DL
TSH SERPL DL<=0.05 MIU/L-ACNC: 3.5 UIU/ML (ref 0.36–3.74)
WBC # BLD AUTO: 4.28 THOUSAND/UL (ref 4.31–10.16)

## 2021-04-19 PROCEDURE — 80053 COMPREHEN METABOLIC PANEL: CPT

## 2021-04-19 PROCEDURE — 84443 ASSAY THYROID STIM HORMONE: CPT

## 2021-04-19 PROCEDURE — 85025 COMPLETE CBC W/AUTO DIFF WBC: CPT

## 2021-04-19 PROCEDURE — 36415 COLL VENOUS BLD VENIPUNCTURE: CPT

## 2021-04-19 PROCEDURE — 83721 ASSAY OF BLOOD LIPOPROTEIN: CPT

## 2021-04-19 PROCEDURE — 80061 LIPID PANEL: CPT

## 2021-05-04 ENCOUNTER — OFFICE VISIT (OUTPATIENT)
Dept: INTERNAL MEDICINE CLINIC | Facility: CLINIC | Age: 71
End: 2021-05-04
Payer: MEDICARE

## 2021-05-04 VITALS
OXYGEN SATURATION: 97 % | HEIGHT: 66 IN | WEIGHT: 149.2 LBS | DIASTOLIC BLOOD PRESSURE: 80 MMHG | TEMPERATURE: 98.2 F | HEART RATE: 88 BPM | BODY MASS INDEX: 23.98 KG/M2 | SYSTOLIC BLOOD PRESSURE: 118 MMHG

## 2021-05-04 DIAGNOSIS — E55.9 VITAMIN D DEFICIENCY: ICD-10-CM

## 2021-05-04 DIAGNOSIS — K66.8 FREE INTRAPERITONEAL AIR: ICD-10-CM

## 2021-05-04 DIAGNOSIS — M85.89 OSTEOPENIA OF MULTIPLE SITES: ICD-10-CM

## 2021-05-04 DIAGNOSIS — E78.01 FAMILIAL HYPERCHOLESTEROLEMIA: Primary | ICD-10-CM

## 2021-05-04 DIAGNOSIS — E03.9 ACQUIRED HYPOTHYROIDISM: ICD-10-CM

## 2021-05-04 PROCEDURE — 99214 OFFICE O/P EST MOD 30 MIN: CPT | Performed by: INTERNAL MEDICINE

## 2021-05-04 RX ORDER — VANCOMYCIN HYDROCHLORIDE 50 MG/ML
KIT ORAL
COMMUNITY
Start: 2021-01-27 | End: 2021-12-06

## 2021-06-11 DIAGNOSIS — K21.9 GASTROESOPHAGEAL REFLUX DISEASE, UNSPECIFIED WHETHER ESOPHAGITIS PRESENT: ICD-10-CM

## 2021-06-13 RX ORDER — PANTOPRAZOLE SODIUM 40 MG/1
TABLET, DELAYED RELEASE ORAL
Qty: 90 TABLET | Refills: 1 | Status: SHIPPED | OUTPATIENT
Start: 2021-06-13 | End: 2021-12-06

## 2021-10-01 DIAGNOSIS — E78.2 MIXED HYPERLIPIDEMIA: ICD-10-CM

## 2021-10-01 RX ORDER — ATORVASTATIN CALCIUM 10 MG/1
TABLET, FILM COATED ORAL
Qty: 90 TABLET | Refills: 3 | Status: SHIPPED | OUTPATIENT
Start: 2021-10-01

## 2021-11-22 ENCOUNTER — APPOINTMENT (OUTPATIENT)
Dept: LAB | Facility: CLINIC | Age: 71
End: 2021-11-22
Payer: MEDICARE

## 2021-11-22 DIAGNOSIS — E55.9 VITAMIN D DEFICIENCY: ICD-10-CM

## 2021-11-22 DIAGNOSIS — M85.89 OSTEOPENIA OF MULTIPLE SITES: ICD-10-CM

## 2021-11-22 DIAGNOSIS — E78.01 FAMILIAL HYPERCHOLESTEROLEMIA: ICD-10-CM

## 2021-11-22 DIAGNOSIS — K66.8 FREE INTRAPERITONEAL AIR: ICD-10-CM

## 2021-11-22 DIAGNOSIS — E03.9 ACQUIRED HYPOTHYROIDISM: ICD-10-CM

## 2021-11-22 LAB
25(OH)D3 SERPL-MCNC: 70.8 NG/ML (ref 30–100)
ALBUMIN SERPL BCP-MCNC: 3.8 G/DL (ref 3.5–5)
ALP SERPL-CCNC: 76 U/L (ref 46–116)
ALT SERPL W P-5'-P-CCNC: 22 U/L (ref 12–78)
ANION GAP SERPL CALCULATED.3IONS-SCNC: 9 MMOL/L (ref 4–13)
AST SERPL W P-5'-P-CCNC: 16 U/L (ref 5–45)
BILIRUB SERPL-MCNC: 0.59 MG/DL (ref 0.2–1)
BUN SERPL-MCNC: 24 MG/DL (ref 5–25)
CALCIUM SERPL-MCNC: 9 MG/DL (ref 8.3–10.1)
CHLORIDE SERPL-SCNC: 106 MMOL/L (ref 100–108)
CHOLEST SERPL-MCNC: 187 MG/DL
CO2 SERPL-SCNC: 27 MMOL/L (ref 21–32)
CREAT SERPL-MCNC: 0.91 MG/DL (ref 0.6–1.3)
GFR SERPL CREATININE-BSD FRML MDRD: 63 ML/MIN/1.73SQ M
GLUCOSE P FAST SERPL-MCNC: 93 MG/DL (ref 65–99)
HDLC SERPL-MCNC: 76 MG/DL
LDLC SERPL CALC-MCNC: 102 MG/DL (ref 0–100)
LDLC SERPL DIRECT ASSAY-MCNC: 95 MG/DL (ref 0–100)
NONHDLC SERPL-MCNC: 111 MG/DL
POTASSIUM SERPL-SCNC: 4.3 MMOL/L (ref 3.5–5.3)
PROT SERPL-MCNC: 7.3 G/DL (ref 6.4–8.2)
SODIUM SERPL-SCNC: 142 MMOL/L (ref 136–145)
T4 FREE SERPL-MCNC: 0.93 NG/DL (ref 0.76–1.46)
TRIGL SERPL-MCNC: 44 MG/DL
TSH SERPL DL<=0.05 MIU/L-ACNC: 4.87 UIU/ML (ref 0.36–3.74)

## 2021-11-22 PROCEDURE — 80053 COMPREHEN METABOLIC PANEL: CPT

## 2021-11-22 PROCEDURE — 84439 ASSAY OF FREE THYROXINE: CPT

## 2021-11-22 PROCEDURE — 80061 LIPID PANEL: CPT

## 2021-11-22 PROCEDURE — 82306 VITAMIN D 25 HYDROXY: CPT

## 2021-11-22 PROCEDURE — 83721 ASSAY OF BLOOD LIPOPROTEIN: CPT

## 2021-11-22 PROCEDURE — 36415 COLL VENOUS BLD VENIPUNCTURE: CPT

## 2021-11-22 PROCEDURE — 84443 ASSAY THYROID STIM HORMONE: CPT

## 2021-12-05 DIAGNOSIS — K21.9 GASTROESOPHAGEAL REFLUX DISEASE, UNSPECIFIED WHETHER ESOPHAGITIS PRESENT: ICD-10-CM

## 2021-12-06 ENCOUNTER — OFFICE VISIT (OUTPATIENT)
Dept: INTERNAL MEDICINE CLINIC | Facility: CLINIC | Age: 71
End: 2021-12-06
Payer: MEDICARE

## 2021-12-06 VITALS
DIASTOLIC BLOOD PRESSURE: 76 MMHG | WEIGHT: 154.3 LBS | OXYGEN SATURATION: 97 % | RESPIRATION RATE: 16 BRPM | BODY MASS INDEX: 24.8 KG/M2 | HEIGHT: 66 IN | HEART RATE: 86 BPM | SYSTOLIC BLOOD PRESSURE: 122 MMHG

## 2021-12-06 DIAGNOSIS — E78.2 MIXED HYPERLIPIDEMIA: Primary | ICD-10-CM

## 2021-12-06 DIAGNOSIS — I65.23 CAROTID STENOSIS, ASYMPTOMATIC, BILATERAL: ICD-10-CM

## 2021-12-06 DIAGNOSIS — Z01.00 ENCOUNTER FOR COMPLETE EYE EXAM: ICD-10-CM

## 2021-12-06 DIAGNOSIS — Z23 ENCOUNTER FOR IMMUNIZATION: ICD-10-CM

## 2021-12-06 DIAGNOSIS — M85.89 OSTEOPENIA OF MULTIPLE SITES: ICD-10-CM

## 2021-12-06 DIAGNOSIS — E03.9 ACQUIRED HYPOTHYROIDISM: ICD-10-CM

## 2021-12-06 DIAGNOSIS — Z00.00 MEDICARE ANNUAL WELLNESS VISIT, SUBSEQUENT: ICD-10-CM

## 2021-12-06 PROCEDURE — 1123F ACP DISCUSS/DSCN MKR DOCD: CPT | Performed by: INTERNAL MEDICINE

## 2021-12-06 PROCEDURE — 99214 OFFICE O/P EST MOD 30 MIN: CPT | Performed by: INTERNAL MEDICINE

## 2021-12-06 PROCEDURE — G0439 PPPS, SUBSEQ VISIT: HCPCS | Performed by: INTERNAL MEDICINE

## 2021-12-06 RX ORDER — ZOSTER VACCINE RECOMBINANT, ADJUVANTED 50 MCG/0.5
0.5 KIT INTRAMUSCULAR ONCE
Qty: 1 EACH | Refills: 1 | Status: SHIPPED | OUTPATIENT
Start: 2021-12-06 | End: 2021-12-06

## 2021-12-06 RX ORDER — PANTOPRAZOLE SODIUM 40 MG/1
TABLET, DELAYED RELEASE ORAL
Qty: 90 TABLET | Refills: 1 | Status: SHIPPED | OUTPATIENT
Start: 2021-12-06 | End: 2022-05-23 | Stop reason: SDUPTHER

## 2021-12-15 NOTE — ASSESSMENT & PLAN NOTE
Called pt and discussed MRI findiings, C-spine and L-spine degenerative disc disease (DJD). Cont PT. R shoulder with GH OA, old distal clavicle fracture, and synovitis, frozen shoulder. Recommend CSI. Pt will think about it and get back to me. · Noted to have PCR positive but EIA  · Given no prior history and evidence of diarrhea, started on vancomycin  · Continue without patient vancomycin for a total of 14 days     · Continue through to 2/3/21

## 2022-02-04 ENCOUNTER — HOSPITAL ENCOUNTER (OUTPATIENT)
Dept: NON INVASIVE DIAGNOSTICS | Facility: CLINIC | Age: 72
Discharge: HOME | End: 2022-02-04
Payer: MEDICARE

## 2022-02-04 DIAGNOSIS — I65.23 CAROTID STENOSIS, ASYMPTOMATIC, BILATERAL: ICD-10-CM

## 2022-02-04 PROCEDURE — 93880 EXTRACRANIAL BILAT STUDY: CPT | Performed by: SURGERY

## 2022-02-04 PROCEDURE — 93880 EXTRACRANIAL BILAT STUDY: CPT

## 2022-05-23 ENCOUNTER — NEW PATIENT (OUTPATIENT)
Dept: URBAN - METROPOLITAN AREA CLINIC 6 | Facility: CLINIC | Age: 72
End: 2022-05-23

## 2022-05-23 DIAGNOSIS — K21.9 GASTROESOPHAGEAL REFLUX DISEASE, UNSPECIFIED WHETHER ESOPHAGITIS PRESENT: ICD-10-CM

## 2022-05-23 DIAGNOSIS — H25.813: ICD-10-CM

## 2022-05-23 DIAGNOSIS — H04.123: ICD-10-CM

## 2022-05-23 PROCEDURE — 99204 OFFICE O/P NEW MOD 45 MIN: CPT

## 2022-05-23 RX ORDER — PANTOPRAZOLE SODIUM 40 MG/1
40 TABLET, DELAYED RELEASE ORAL
Qty: 90 TABLET | Refills: 1 | Status: SHIPPED | OUTPATIENT
Start: 2022-05-23

## 2022-05-23 ASSESSMENT — VISUAL ACUITY
OS_SC: 20/70
OD_SC: 20/50
OD_PH: 20/40
OU_SC: J3
OS_PH: 20/30

## 2022-05-23 ASSESSMENT — TONOMETRY
OD_IOP_MMHG: 12
OS_IOP_MMHG: 13

## 2022-06-03 ENCOUNTER — HOSPITAL ENCOUNTER (OUTPATIENT)
Dept: RADIOLOGY | Age: 72
Discharge: HOME | End: 2022-06-03
Payer: MEDICARE

## 2022-06-03 DIAGNOSIS — M85.89 OSTEOPENIA OF MULTIPLE SITES: ICD-10-CM

## 2022-06-03 PROCEDURE — 77080 DXA BONE DENSITY AXIAL: CPT

## 2022-06-07 ENCOUNTER — TELEPHONE (OUTPATIENT)
Dept: OBGYN CLINIC | Facility: CLINIC | Age: 72
End: 2022-06-07

## 2022-06-07 NOTE — TELEPHONE ENCOUNTER
Per comm consent lm to pt and let her know to f/u with appt with either [facility] or PCP re: tx for osteoporosis

## 2022-06-07 NOTE — TELEPHONE ENCOUNTER
----- Message from Antonio Bynum PA-C sent at 6/7/2022  2:21 PM EDT -----  Dexa shows worsening of her osteoporosis, FRAX score indicates higher risk for fracture  Would really consider treatment for this  She can either come in to talk to me or can see her PCP for this

## 2022-07-22 NOTE — ANESTHESIA PREPROCEDURE EVALUATION
Procedure:  LAPAROTOMY EXPLORATORY W/ BOWEL RESECTION (N/A Abdomen)  S/P EGD/ COLONSOCOPY YESTERDAY, MARIELA AT 0900 THIS AM  Relevant Problems   CARDIO   (+) Hyperlipidemia      GI/HEPATIC   (+) Acute GI bleeding   (+) Esophageal reflux      Other   (+) C  difficile diarrhea   (+) H/O hernia repair   (+) Pneumoperitoneum        Physical Exam    Airway    Mallampati score: II  TM Distance: >3 FB  Neck ROM: full     Dental   No notable dental hx     Cardiovascular      Pulmonary      Other Findings        Anesthesia Plan  ASA Score- 3     Anesthesia Type- general and regional with ASA Monitors  Additional Monitors:   Airway Plan: ETT  Comment: TAP BLOCKS DISCUSSED  CONSENT GIVEN          Plan Factors-Exercise tolerance (METS): >4 METS  Chart reviewed  EKG reviewed  Existing labs reviewed  Patient is not a current smoker  Patient not instructed to abstain from smoking on day of procedure  Patient did not smoke on day of surgery  Induction- intravenous  Postoperative Plan-     Informed Consent- Anesthetic plan and risks discussed with patient  I personally reviewed this patient with the CRNA  Discussed and agreed on the Anesthesia Plan with the CRNA             Lab Results   Component Value Date    GLUC 123 01/21/2021    ALT 65 01/19/2021    AST 31 01/19/2021    BUN 7 01/21/2021    CALCIUM 8 4 01/21/2021     01/21/2021    CO2 27 01/21/2021    CREATININE 0 89 01/21/2021    HDL 61 06/15/2020    INR 0 96 01/19/2021    HCT 33 2 (L) 01/21/2021    HGB 10 6 (L) 01/21/2021    HGBA1C 5 8 (H) 06/15/2020     01/21/2021    K 3 5 01/21/2021    TRIG 110 06/15/2020    WBC 10 48 (H) 01/21/2021     Discussed with Patient the procedure for Meri  TAP Blocks, side effects including  potential for an incomplete Block  All questions answered  Consent given 
<-------- Click here to INCLUDE CoVID-19 Discharge Instructions

## 2022-10-12 DIAGNOSIS — E78.2 MIXED HYPERLIPIDEMIA: ICD-10-CM

## 2022-10-12 PROBLEM — A04.72 C. DIFFICILE DIARRHEA: Status: RESOLVED | Noted: 2021-01-20 | Resolved: 2022-10-12

## 2022-10-12 PROBLEM — Z12.11 SCREENING FOR COLON CANCER: Status: RESOLVED | Noted: 2020-12-07 | Resolved: 2022-10-12

## 2022-10-12 RX ORDER — ATORVASTATIN CALCIUM 10 MG/1
TABLET, FILM COATED ORAL
Qty: 90 TABLET | Refills: 3 | Status: SHIPPED | OUTPATIENT
Start: 2022-10-12

## 2022-11-28 DIAGNOSIS — K21.9 GASTROESOPHAGEAL REFLUX DISEASE, UNSPECIFIED WHETHER ESOPHAGITIS PRESENT: ICD-10-CM

## 2022-11-28 RX ORDER — PANTOPRAZOLE SODIUM 40 MG/1
TABLET, DELAYED RELEASE ORAL
Qty: 90 TABLET | Refills: 1 | Status: SHIPPED | OUTPATIENT
Start: 2022-11-28

## 2022-12-19 ENCOUNTER — OFFICE VISIT (OUTPATIENT)
Dept: FAMILY MEDICINE CLINIC | Facility: CLINIC | Age: 72
End: 2022-12-19

## 2022-12-19 ENCOUNTER — TELEPHONE (OUTPATIENT)
Dept: ADMINISTRATIVE | Facility: OTHER | Age: 72
End: 2022-12-19

## 2022-12-19 VITALS
DIASTOLIC BLOOD PRESSURE: 82 MMHG | HEIGHT: 65 IN | WEIGHT: 152.2 LBS | SYSTOLIC BLOOD PRESSURE: 120 MMHG | HEART RATE: 95 BPM | BODY MASS INDEX: 25.36 KG/M2 | OXYGEN SATURATION: 98 % | RESPIRATION RATE: 14 BRPM | TEMPERATURE: 96.5 F

## 2022-12-19 DIAGNOSIS — M81.0 AGE-RELATED OSTEOPOROSIS WITHOUT CURRENT PATHOLOGICAL FRACTURE: ICD-10-CM

## 2022-12-19 DIAGNOSIS — Z23 IMMUNIZATION DUE: ICD-10-CM

## 2022-12-19 DIAGNOSIS — R53.83 OTHER FATIGUE: ICD-10-CM

## 2022-12-19 DIAGNOSIS — E55.9 VITAMIN D DEFICIENCY: ICD-10-CM

## 2022-12-19 DIAGNOSIS — K57.90 DIVERTICULOSIS: ICD-10-CM

## 2022-12-19 DIAGNOSIS — Z13.1 SCREENING FOR DIABETES MELLITUS: ICD-10-CM

## 2022-12-19 DIAGNOSIS — E78.01 FAMILIAL HYPERCHOLESTEROLEMIA: Primary | ICD-10-CM

## 2022-12-19 DIAGNOSIS — K21.9 GASTROESOPHAGEAL REFLUX DISEASE, UNSPECIFIED WHETHER ESOPHAGITIS PRESENT: ICD-10-CM

## 2022-12-19 RX ORDER — FAMOTIDINE 20 MG/1
20 TABLET, FILM COATED ORAL 2 TIMES DAILY
Qty: 180 TABLET | Refills: 1 | Status: SHIPPED | OUTPATIENT
Start: 2022-12-19

## 2022-12-19 NOTE — TELEPHONE ENCOUNTER
----- Message from Mitch Maharaj LPN sent at 46/55/5429 11:17 AM EST -----  Regarding: care gap request  12/19/22 11:17 AM    Hello, our patient attached above has had Hepatitis C completed/performed  Please assist in updating the patient chart by pulling the Care Everywhere (CE) document  The date of service is 12/07/2012       Thank you,  Mitch Maharaj  [facility] [address] updated [phone]

## 2022-12-19 NOTE — TELEPHONE ENCOUNTER
----- Message from Roge Taylor LPN sent at 81/90/9681 11:16 AM EST -----  Regarding: care gap request  12/19/22 11:16 AM    Hello, our patient attached above has had Mammogram completed/performed  Please assist in updating the patient chart by making an External outreach to [facility] facility located in [facility], [address] [place] [address]  The date of service is 11/2022      Thank you,  Roge HARTMANN

## 2022-12-19 NOTE — TELEPHONE ENCOUNTER
Upon review of the In Basket request and the patient's chart, initial outreach has been made via telephone call to facility  Please see Contacts section for details       Thank you  Sheyla Rivas MA

## 2022-12-19 NOTE — PROGRESS NOTES
FAMILY PRACTICE OFFICE VISIT       NAME: Acosta Coburn  AGE: 74 y o  SEX: female       : 1950        MRN: [id number]    DATE: 2022  TIME: 11:54 AM    Assessment and Plan   1  Familial hypercholesterolemia  Comments:  Pt stable - on Atorvastatin  FBW ordered  She is to continue a lower carb/saturated fat diet, and remain active  Orders:  -     Lipid Panel with Direct LDL reflex; Future    2  Vitamin D deficiency  Comments:  Stable; on OTC Supplement  Orders:  -     Vitamin D 25 hydroxy; Future    3  Diverticulosis  Comments:  Hx of, with hx of diverticular bleed prior  Orders:  -     CBC and differential; Future    4  Gastroesophageal reflux disease, unspecified whether esophagitis present  Comments:  Hx of  Switch Pantoprazole to Famotidine  Orders:  -     CBC and differential; Future  -     famotidine (PEPCID) 20 mg tablet; Take 1 tablet (20 mg total) by mouth 2 (two) times a day    5  Screening for diabetes mellitus  -     Comprehensive metabolic panel; Future    6  Age-related osteoporosis without current pathological fracture  Comments:  Discussed options - Fosamax / Prolia inj / etc, OTC Calcium max 1200mg daily, OTC Vit D3, wt bearing exercise  7  Other fatigue  -     TSH, 3rd generation with Free T4 reflex; Future  -     CBC and differential; Future    8  Immunization due  Comments:  HD Flu Vaccine given IM, and tolerated well  Orders:  -     influenza vaccine, high-dose, PF 0 7 mL (FLUZONE HIGH-DOSE)         There are no Patient Instructions on file for this visit  Chief Complaint     Chief Complaint   Patient presents with   • Establish Care     Patient being seen to establish care   • Medication Management     Patient being seen for medication review        History of Present Illness   Acosta Coburn is a 74y o -year-old female who presents as a New Patient - , Marques Montaño, is also a pt of mine  Hx of elevated cholesterol, osteoporosis now    Vaccinated against COV-19  Had colonoscopy approx 1 5 years ago in 2021 - hx of tiny perforation of colon  Hx of diverticulosis  Review of Systems   Review of Systems   Constitutional: Negative for activity change  Respiratory: Negative for shortness of breath  Cardiovascular: Negative for chest pain  Gastrointestinal: Negative for abdominal pain and blood in stool  Musculoskeletal: Negative for myalgias         Active Problem List     Patient Active Problem List   Diagnosis   • Diverticulosis   • Esophageal reflux   • Hyperlipidemia   • Osteopenia   • Vitamin D deficiency   • Prediabetes   • Blood in stool   • History of GI diverticular bleed   • Diarrhea   • H/O hernia repair   • Postoperative visit   • History of colon polyps         Past Medical History:  Past Medical History:   Diagnosis Date   • [facility]ical [facility] ([facility] )     last assessed 09Apr2014   • Diverticulosis    • Ear problems    • HL (hearing loss)    • Hyperlipemia    • Tinnitus        Past Surgical History:  Past Surgical History:   Procedure Laterality Date   • BREAST BIOPSY Left    • COLON SURGERY      Bowel perforation   • COLONOSCOPY      (Fiberoptic)   • EXPLORATORY LAPAROTOMY W/ BOWEL RESECTION N/A 01/21/2021    Procedure: LAPAROTOMY EXPLORATORY W/ REPAIR OF UMBLICAL HERNIA;  Surgeon: Nicole Akbar MD;  [facility];  Service: General   • HERNIA REPAIR     • HYSTERECTOMY     • [place] LAP,INGUINAL HERNIA REPR,INITIAL Right 01/06/2021    Procedure: ROBOTIC INGUINAL HERNIA REPAIR;  Surgeon: Nadege Ramirez MD;  [facility];  Service: General       Family History:  Family History   Problem Relation Age of Onset   • Breast cancer Mother    • Ovarian cancer Maternal Grandmother    • Breast cancer Family    • Thyroid disease Family    • Breast cancer Paternal Aunt    • Breast cancer Cousin    • Breast cancer Daughter        Social History:  Social History     Socioeconomic History   • Marital status: /Civil Union     Spouse name: Not on file   • Number of children: Not on file   • Years of education: Not on file   • Highest education level: Not on file   Occupational History   • Not on file   Tobacco Use   • Smoking status: Never   • Smokeless tobacco: Never   Vaping Use   • Vaping Use: Never used   Substance and Sexual Activity   • Alcohol use: Not Currently     Comment: holidays - wine   • Drug use: No   • Sexual activity: Yes     Partners: Male   Other Topics Concern   • Not on file   Social History Narrative    denied caffeine use    H/o exercising regularly     Social Determinants of Health     Financial Resource Strain: Not on file   Food Insecurity: Not on file   Transportation Needs: Not on file   Physical Activity: Not on file   Stress: Not on file   Social Connections: Not on file   Intimate Partner Violence: Not on file   Housing Stability: Not on file       Objective     Vitals:    12/19/22 1104   BP: 120/82   Pulse: 95   Resp: 14   Temp: (!) 96 5 °F (35 8 °C)   SpO2: 98%     Wt Readings from Last 3 Encounters:   12/19/22 69 kg (152 lb 3 2 oz)   12/06/21 70 kg (154 lb 4 8 oz)   05/04/21 67 7 kg (149 lb 3 2 oz)       Physical Exam  Vitals and nursing note reviewed  Constitutional:       General: She is not in acute distress  Appearance: Normal appearance  She is not ill-appearing, toxic-appearing or diaphoretic  HENT:      Head: Normocephalic and atraumatic  Eyes:      General: No scleral icterus  Conjunctiva/sclera: Conjunctivae normal    Cardiovascular:      Rate and Rhythm: Normal rate and regular rhythm  Heart sounds: Normal heart sounds  No murmur heard  No friction rub  No gallop  Pulmonary:      Effort: Pulmonary effort is normal  No respiratory distress  Breath sounds: Normal breath sounds  No stridor  No wheezing, rhonchi or rales  Abdominal:      General: Abdomen is flat  Bowel sounds are normal  There is no distension  Palpations: Abdomen is soft  There is no mass  Tenderness:  There is no abdominal tenderness  There is no guarding or rebound  Musculoskeletal:      Cervical back: Normal range of motion and neck supple  No rigidity or tenderness  Lymphadenopathy:      Cervical: No cervical adenopathy  Neurological:      Mental Status: She is alert and oriented to person, place, and time  Psychiatric:         Mood and Affect: Mood normal          Behavior: Behavior normal          Thought Content:  Thought content normal          Judgment: Judgment normal          Pertinent Laboratory/Diagnostic Studies:  Lab Results   Component Value Date    BUN 24 11/22/2021    CREATININE 0 91 11/22/2021    CALCIUM 9 0 11/22/2021    K 4 3 11/22/2021    CO2 27 11/22/2021     11/22/2021     Lab Results   Component Value Date    ALT 22 11/22/2021    AST 16 11/22/2021    ALKPHOS 76 11/22/2021       Lab Results   Component Value Date    WBC 4 28 (L) 04/19/2021    HGB 13 8 04/19/2021    HCT 44 8 04/19/2021    MCV 88 04/19/2021     04/19/2021       No results found for: TSH    No results found for: CHOL  Lab Results   Component Value Date    TRIG 44 11/22/2021     Lab Results   Component Value Date    HDL 76 11/22/2021     Lab Results   Component Value Date    LDLCALC 102 (H) 11/22/2021     Lab Results   Component Value Date    HGBA1C 5 8 (H) 06/15/2020       Results for orders placed or performed in visit on 11/22/21   Comprehensive metabolic panel   Result Value Ref Range    Sodium 142 136 - 145 mmol/L    Potassium 4 3 3 5 - 5 3 mmol/L    Chloride 106 100 - 108 mmol/L    CO2 27 21 - 32 mmol/L    ANION GAP 9 4 - 13 mmol/L    BUN 24 5 - 25 mg/dL    Creatinine 0 91 0 60 - 1 30 mg/dL    Glucose, Fasting 93 65 - 99 mg/dL    Calcium 9 0 8 3 - 10 1 mg/dL    AST 16 5 - 45 U/L    ALT 22 12 - 78 U/L    Alkaline Phosphatase 76 46 - 116 U/L    Total Protein 7 3 6 4 - 8 2 g/dL    Albumin 3 8 3 5 - 5 0 g/dL    Total Bilirubin 0 59 0 20 - 1 00 mg/dL    eGFR 63 ml/min/1 73sq m   TSH, 3rd generation with Free T4 reflex   Result Value Ref Range    TSH 3RD GENERATON 4 866 (H) 0 358 - 3 740 uIU/mL   Vitamin D 25 hydroxy   Result Value Ref Range    Vit D, 25-Hydroxy 70 8 30 0 - 100 0 ng/mL   Lipid panel   Result Value Ref Range    Cholesterol 187 See Comment mg/dL    Triglycerides 44 See Comment mg/dL    HDL, Direct 76 >=50 mg/dL    LDL Calculated 102 (H) 0 - 100 mg/dL    Non-HDL-Chol (CHOL-HDL) 111 mg/dl   LDL cholesterol, direct   Result Value Ref Range    LDL Direct 95 0 - 100 mg/dl   T4, free   Result Value Ref Range    Free T4 0 93 0 76 - 1 46 ng/dL       Orders Placed This Encounter   Procedures   • influenza vaccine, high-dose, PF 0 7 mL (FLUZONE HIGH-DOSE)   • Comprehensive metabolic panel   • Lipid Panel with Direct LDL reflex   • TSH, 3rd generation with Free T4 reflex   • CBC and differential   • Vitamin D 25 hydroxy       ALLERGIES:  Allergies   Allergen Reactions   • Codeine Hives       Current Medications     Current Outpatient Medications   Medication Sig Dispense Refill   • atorvastatin (LIPITOR) 10 mg tablet TAKE 1 TABLET BY MOUTH EVERY DAY 90 tablet 3   • Calcium Carb-Cholecalciferol 1000-800 MG-UNIT TABS Take by mouth     • cholecalciferol (VITAMIN D3) 1,000 units tablet Take 3 tablets by mouth daily     • famotidine (PEPCID) 20 mg tablet Take 1 tablet (20 mg total) by mouth 2 (two) times a day 180 tablet 1   • Probiotic Product (PROBIOTIC-10) CAPS Take by mouth       No current facility-administered medications for this visit           Health Maintenance     Health Maintenance   Topic Date Due   • Hepatitis C Screening  Never done   • Hepatitis B Vaccine (1 of 3 - 3-dose series) Never done   • BMI: Followup Plan  02/09/2022   • COVID-19 Vaccine (4 - Booster for Moderna series) 03/01/2022   • Influenza Vaccine (1) 09/01/2022   • Medicare Annual Wellness Visit (AWV)  12/06/2022   • Breast Cancer Screening: Mammogram  11/10/2022   • Fall Risk  12/19/2023   • Depression Screening  12/19/2023   • Urinary Incontinence Screening  12/19/2023   • BMI: Adult  12/19/2023   • Colorectal Cancer Screening  07/29/2031   • Osteoporosis Screening  Completed   • Pneumococcal Vaccine: 65+ Years  Completed   • HIB Vaccine  Aged Out   • IPV Vaccine  Aged Out   • Hepatitis A Vaccine  Aged Out   • Meningococcal ACWY Vaccine  Aged Out   • HPV Vaccine  Aged Out     Immunization History   Administered Date(s) Administered   • COVID-19 MODERNA VACC 0 25 ML IM BOOSTER 11/01/2021   • COVID-19 MODERNA VACC 0 5 ML IM 02/19/2021, 03/19/2021   • Influenza Quadrivalent, 6-35 Months IM 11/11/2015   • Influenza Split High Dose Preservative Free IM 11/23/2016, 11/27/2017   • Influenza, high dose seasonal 0 7 mL 10/08/2019, 10/20/2020, 11/01/2021   • Influenza, seasonal, injectable 11/21/2012   • Pneumococcal Conjugate 13-Valent 11/11/2015   • Pneumococcal Polysaccharide PPV23 04/11/2014, 11/11/2015     BMI Counseling: Body mass index is 25 11 kg/m²  The BMI is above normal  Nutrition recommendations include moderation in carbohydrate intake and reducing intake of saturated and trans fat  Exercise recommendations include exercising 3-5 times per week  No pharmacotherapy was ordered  Patient referred to PCP  Rationale for BMI follow-up plan is due to patient being overweight or obese  Depression Screening and Follow-up Plan: Patient was screened for depression during today's encounter  They screened negative with a PHQ-2 score of 0          Noris Moya DO

## 2022-12-21 NOTE — TELEPHONE ENCOUNTER
Upon review of the In Basket request we were able to locate, review, and update the patient chart as requested for Mammogram     Any additional questions or concerns should be emailed to the Practice Liaisons via the appropriate education email address, please do not reply via In Basket      Thank you  Zackary Bravo MA

## 2022-12-23 ENCOUNTER — TELEPHONE (OUTPATIENT)
Dept: OTHER | Facility: OTHER | Age: 72
End: 2022-12-23

## 2022-12-23 NOTE — TELEPHONE ENCOUNTER
Patient was recently switched from Pantoprazole to a new prescription Famotidine (Pepcid) 20 MG  Patient called in just to check to see if it was OK if she stopped the Pantoprazole today and start taking the Pepcid right away  Please call patient back to discuss

## 2022-12-23 NOTE — TELEPHONE ENCOUNTER
Patient advised PCP is out of office but that it is OK to stop the Pantoprazole and start Famotidine per Dr Benson Franco

## 2023-03-15 DIAGNOSIS — K21.9 GASTROESOPHAGEAL REFLUX DISEASE, UNSPECIFIED WHETHER ESOPHAGITIS PRESENT: ICD-10-CM

## 2023-03-15 RX ORDER — FAMOTIDINE 20 MG/1
TABLET, FILM COATED ORAL
Qty: 180 TABLET | Refills: 1 | Status: SHIPPED | OUTPATIENT
Start: 2023-03-15

## 2023-05-31 ENCOUNTER — OFFICE VISIT (OUTPATIENT)
Dept: FAMILY MEDICINE CLINIC | Facility: CLINIC | Age: 73
End: 2023-05-31

## 2023-05-31 VITALS
TEMPERATURE: 97.4 F | SYSTOLIC BLOOD PRESSURE: 140 MMHG | WEIGHT: 156.6 LBS | HEIGHT: 65 IN | DIASTOLIC BLOOD PRESSURE: 78 MMHG | HEART RATE: 79 BPM | OXYGEN SATURATION: 97 % | BODY MASS INDEX: 26.09 KG/M2 | RESPIRATION RATE: 14 BRPM

## 2023-05-31 DIAGNOSIS — M25.471 RIGHT ANKLE SWELLING: Primary | ICD-10-CM

## 2023-05-31 NOTE — PROGRESS NOTES
FAMILY PRACTICE OFFICE VISIT       NAME: Thai Heller  AGE: 74 y o  SEX: female       : 1950        MRN: [id number]    DATE: 2023  TIME: 4:20 PM    Assessment and Plan   1  Right ankle swelling  Comments:  Pt stable; suspect was mild sprain of ankle, given pt is very active (doubt gout, fx, vol overload, cellulitis)  Elevate foot, compression sleeve, foot soaks  There are no Patient Instructions on file for this visit  Chief Complaint     Chief Complaint   Patient presents with   • Edema     Patient being seen for Right foot and ankle swollen x 1 week  No known injury  History of Present Illness   Thai Heller is a 74y o -year-old female who presents with approx 1 week of swelling of the right foot and ankle  No fall or trauma to the site  Some discomfort; not a lot of pain  No fevers  No hx of gout  No recent long car rides or flights  Able to ambulate fine  Review of Systems   Review of Systems   Constitutional: Negative for activity change and fever  Respiratory: Negative for shortness of breath  Cardiovascular: Negative for chest pain  Musculoskeletal: Negative for arthralgias  Right ankle pain / swelling         Active Problem List     Patient Active Problem List   Diagnosis   • Diverticulosis   • Esophageal reflux   • Hyperlipidemia   • Osteopenia   • Vitamin D deficiency   • Prediabetes   • Blood in stool   • History of GI diverticular bleed   • Diarrhea   • H/O hernia repair   • Postoperative visit   • History of colon polyps         Past Medical History:  Past Medical History:   Diagnosis Date   • Cervical [facility] ([facility] )     last assessed 2014   • Diverticulosis    • Ear problems    • HL (hearing loss)    • Hyperlipemia    • Tinnitus        Past Surgical History:  Past Surgical History:   Procedure Laterality Date   • BREAST BIOPSY Left    • COLON SURGERY      Bowel perforation   • COLONOSCOPY      (Fiberoptic)   • EXPLORATORY LAPAROTOMY W/ BOWEL RESECTION N/A 01/21/2021    Procedure: LAPAROTOMY EXPLORATORY W/ REPAIR OF UMBLICAL HERNIA;  Surgeon: Chrissie Arellano MD;  [facility];  Service: General   • HERNIA REPAIR     • HYSTERECTOMY     • WA LAPAROSCOPY SURG RPR INITIAL INGUINAL HERNIA Right 01/06/2021    Procedure: ROBOTIC INGUINAL HERNIA REPAIR;  Surgeon: Milagros Mendoza MD;  [facility];  Service: General       Family History:  Family History   Problem Relation Age of Onset   • Breast cancer Mother    • Ovarian cancer Maternal Grandmother    • Breast cancer Family    • Thyroid disease Family    • Breast cancer Paternal Aunt    • Breast cancer Cousin    • Breast cancer Daughter        Social History:  Social History     Socioeconomic History   • Marital status: /Civil Union     Spouse name: Not on file   • Number of children: Not on file   • Years of education: Not on file   • Highest education level: Not on file   Occupational History   • Not on file   Tobacco Use   • Smoking status: Never   • Smokeless tobacco: Never   Vaping Use   • Vaping Use: Never used   Substance and Sexual Activity   • Alcohol use: Not Currently     Comment: holidays - wine   • Drug use: No   • Sexual activity: Yes     Partners: Male   Other Topics Concern   • Not on file   Social History Narrative    denied caffeine use    H/o exercising regularly     Social Determinants of Health     Financial Resource Strain: Not on file   Food Insecurity: Not on file   Transportation Needs: Not on file   Physical Activity: Not on file   Stress: Not on file   Social Connections: Not on file   Intimate Partner Violence: Not on file   Housing Stability: Not on file       Objective     Vitals:    05/31/23 1546   BP: 140/78   Pulse: 79   Resp: 14   Temp: (!) 97 4 °F (36 3 °C)   SpO2: 97%     Wt Readings from Last 3 Encounters:   05/31/23 71 kg (156 lb 9 6 oz)   12/19/22 69 kg (152 lb 3 2 oz)   12/06/21 70 kg (154 lb 4 8 oz)       Physical Exam  Vitals and nursing note "reviewed  Constitutional:       General: She is not in acute distress  Appearance: Normal appearance  She is not ill-appearing, toxic-appearing or diaphoretic  HENT:      Head: Normocephalic and atraumatic  Eyes:      General: No scleral icterus  Conjunctiva/sclera: Conjunctivae normal    Neck:      Vascular: No JVD  Cardiovascular:      Rate and Rhythm: Normal rate and regular rhythm  Heart sounds: Normal heart sounds  No murmur heard  No friction rub  No gallop  Pulmonary:      Effort: Pulmonary effort is normal  No respiratory distress  Breath sounds: Normal breath sounds  No stridor  No wheezing, rhonchi or rales  Musculoskeletal:      Cervical back: Normal range of motion and neck supple  No rigidity or tenderness  Legs:         Feet:    Lymphadenopathy:      Cervical: No cervical adenopathy  Neurological:      Mental Status: She is alert and oriented to person, place, and time  Psychiatric:         Mood and Affect: Mood normal          Behavior: Behavior normal          Thought Content:  Thought content normal          Judgment: Judgment normal          Pertinent Laboratory/Diagnostic Studies:  Lab Results   Component Value Date    BUN 24 11/22/2021    CALCIUM 9 0 11/22/2021     11/22/2021    CO2 27 11/22/2021    CREATININE 0 91 11/22/2021    K 4 3 11/22/2021     Lab Results   Component Value Date    ALKPHOS 76 11/22/2021    ALT 22 11/22/2021    AST 16 11/22/2021       Lab Results   Component Value Date    HCT 44 8 04/19/2021    HGB 13 8 04/19/2021    MCV 88 04/19/2021     04/19/2021    WBC 4 28 (L) 04/19/2021       No results found for: \"TSH\"    No results found for: \"CHOL\"  Lab Results   Component Value Date    TRIG 44 11/22/2021     Lab Results   Component Value Date    HDL 76 11/22/2021     Lab Results   Component Value Date    LDLCALC 102 (H) 11/22/2021     Lab Results   Component Value Date    HGBA1C 5 8 (H) 06/15/2020       Results for orders " placed or performed in visit on 12/19/22   Hepatitis C antibody   Result Value Ref Range    HEP C AB negative        No orders of the defined types were placed in this encounter  ALLERGIES:  Allergies   Allergen Reactions   • Codeine Hives       Current Medications     Current Outpatient Medications   Medication Sig Dispense Refill   • atorvastatin (LIPITOR) 10 mg tablet TAKE 1 TABLET BY MOUTH EVERY DAY 90 tablet 3   • Calcium Carb-Cholecalciferol 1000-800 MG-UNIT TABS Take by mouth     • cholecalciferol (VITAMIN D3) 1,000 units tablet Take 3 tablets by mouth daily     • famotidine (PEPCID) 20 mg tablet TAKE 1 TABLET BY MOUTH TWICE A  tablet 1   • Probiotic Product (PROBIOTIC-10) CAPS Take by mouth       No current facility-administered medications for this visit           Health Maintenance     Health Maintenance   Topic Date Due   • COVID-19 Vaccine (4 - Moderna series) 12/27/2021   • Medicare Annual Wellness Visit (AWV)  12/06/2022   • Breast Cancer Screening: Mammogram  11/10/2023   • Urinary Incontinence Screening  12/19/2023   • BMI: Followup Plan  12/19/2023   • Fall Risk  05/31/2024   • Depression Screening  05/31/2024   • BMI: Adult  05/31/2024   • Colorectal Cancer Screening  07/29/2031   • Hepatitis C Screening  Completed   • Osteoporosis Screening  Completed   • Pneumococcal Vaccine: 65+ Years  Completed   • Influenza Vaccine  Completed   • HIB Vaccine  Aged Out   • IPV Vaccine  Aged Out   • Hepatitis A Vaccine  Aged Out   • Meningococcal ACWY Vaccine  Aged Out   • HPV Vaccine  Aged Out     Immunization History   Administered Date(s) Administered   • COVID-19 MODERNA VACC 0 25 ML IM BOOSTER 11/01/2021   • COVID-19 MODERNA VACC 0 5 ML IM 02/19/2021, 03/19/2021   • Influenza Quadrivalent, 6-35 Months IM 11/11/2015   • Influenza Split High Dose Preservative Free IM 11/23/2016, 11/27/2017   • Influenza, high dose seasonal 0 7 mL 10/08/2019, 10/20/2020, 11/01/2021, 12/19/2022   • Influenza, seasonal, injectable 11/21/2012   • Pneumococcal Conjugate 13-Valent 11/11/2015   • Pneumococcal Polysaccharide PPV23 04/11/2014, 11/11/2015          Fercho Morfin DO

## 2023-06-20 ENCOUNTER — RA CDI HCC (OUTPATIENT)
Dept: OTHER | Facility: HOSPITAL | Age: 73
End: 2023-06-20

## 2023-06-20 NOTE — PROGRESS NOTES
[facility] [address]  coding opportunities       Chart reviewed, no opportunity found: CHART REVIEWED, NO OPPORTUNITY FOUND        Patients Insurance     Medicare Insurance: Medicare

## 2023-07-19 ENCOUNTER — APPOINTMENT (OUTPATIENT)
Dept: LAB | Facility: CLINIC | Age: 73
End: 2023-07-19
Payer: COMMERCIAL

## 2023-07-19 DIAGNOSIS — R53.83 OTHER FATIGUE: ICD-10-CM

## 2023-07-19 DIAGNOSIS — K21.9 GASTROESOPHAGEAL REFLUX DISEASE, UNSPECIFIED WHETHER ESOPHAGITIS PRESENT: ICD-10-CM

## 2023-07-19 DIAGNOSIS — Z13.1 SCREENING FOR DIABETES MELLITUS: ICD-10-CM

## 2023-07-19 DIAGNOSIS — E55.9 VITAMIN D DEFICIENCY: ICD-10-CM

## 2023-07-19 DIAGNOSIS — K57.90 DIVERTICULOSIS: ICD-10-CM

## 2023-07-19 DIAGNOSIS — E78.01 FAMILIAL HYPERCHOLESTEROLEMIA: ICD-10-CM

## 2023-07-19 LAB
25(OH)D3 SERPL-MCNC: 102.2 NG/ML (ref 30–100)
ALBUMIN SERPL BCP-MCNC: 4.2 G/DL (ref 3.5–5)
ALP SERPL-CCNC: 62 U/L (ref 34–104)
ALT SERPL W P-5'-P-CCNC: 13 U/L (ref 7–52)
ANION GAP SERPL CALCULATED.3IONS-SCNC: 6 MMOL/L
AST SERPL W P-5'-P-CCNC: 14 U/L (ref 13–39)
BASOPHILS # BLD AUTO: 0.06 THOUSANDS/ÂΜL (ref 0–0.1)
BASOPHILS NFR BLD AUTO: 1 % (ref 0–1)
BILIRUB SERPL-MCNC: 0.8 MG/DL (ref 0.2–1)
BUN SERPL-MCNC: 18 MG/DL (ref 5–25)
CALCIUM SERPL-MCNC: 9.5 MG/DL (ref 8.4–10.2)
CHLORIDE SERPL-SCNC: 105 MMOL/L (ref 96–108)
CHOLEST SERPL-MCNC: 161 MG/DL
CO2 SERPL-SCNC: 28 MMOL/L (ref 21–32)
CREAT SERPL-MCNC: 0.96 MG/DL (ref 0.6–1.3)
EOSINOPHIL # BLD AUTO: 0.19 THOUSAND/ÂΜL (ref 0–0.61)
EOSINOPHIL NFR BLD AUTO: 4 % (ref 0–6)
ERYTHROCYTE [DISTWIDTH] IN BLOOD BY AUTOMATED COUNT: 13.7 % (ref 11.6–15.1)
GFR SERPL CREATININE-BSD FRML MDRD: 58 ML/MIN/1.73SQ M
GLUCOSE P FAST SERPL-MCNC: 99 MG/DL (ref 65–99)
HCT VFR BLD AUTO: 49.1 % (ref 34.8–46.1)
HDLC SERPL-MCNC: 56 MG/DL
HGB BLD-MCNC: 15.7 G/DL (ref 11.5–15.4)
IMM GRANULOCYTES # BLD AUTO: 0.01 THOUSAND/UL (ref 0–0.2)
IMM GRANULOCYTES NFR BLD AUTO: 0 % (ref 0–2)
LDLC SERPL CALC-MCNC: 88 MG/DL (ref 0–100)
LYMPHOCYTES # BLD AUTO: 1.65 THOUSANDS/ÂΜL (ref 0.6–4.47)
LYMPHOCYTES NFR BLD AUTO: 31 % (ref 14–44)
MCH RBC QN AUTO: 30.1 PG (ref 26.8–34.3)
MCHC RBC AUTO-ENTMCNC: 32 G/DL (ref 31.4–37.4)
MCV RBC AUTO: 94 FL (ref 82–98)
MONOCYTES # BLD AUTO: 0.42 THOUSAND/ÂΜL (ref 0.17–1.22)
MONOCYTES NFR BLD AUTO: 8 % (ref 4–12)
NEUTROPHILS # BLD AUTO: 2.95 THOUSANDS/ÂΜL (ref 1.85–7.62)
NEUTS SEG NFR BLD AUTO: 56 % (ref 43–75)
NRBC BLD AUTO-RTO: 0 /100 WBCS
PLATELET # BLD AUTO: 290 THOUSANDS/UL (ref 149–390)
PMV BLD AUTO: 9.2 FL (ref 8.9–12.7)
POTASSIUM SERPL-SCNC: 4 MMOL/L (ref 3.5–5.3)
PROT SERPL-MCNC: 7 G/DL (ref 6.4–8.4)
RBC # BLD AUTO: 5.21 MILLION/UL (ref 3.81–5.12)
SODIUM SERPL-SCNC: 139 MMOL/L (ref 135–147)
T4 FREE SERPL-MCNC: 0.81 NG/DL (ref 0.61–1.12)
TRIGL SERPL-MCNC: 84 MG/DL
TSH SERPL DL<=0.05 MIU/L-ACNC: 7.27 UIU/ML (ref 0.45–4.5)
WBC # BLD AUTO: 5.28 THOUSAND/UL (ref 4.31–10.16)

## 2023-07-19 PROCEDURE — 84443 ASSAY THYROID STIM HORMONE: CPT

## 2023-07-19 PROCEDURE — 84439 ASSAY OF FREE THYROXINE: CPT

## 2023-07-19 PROCEDURE — 85025 COMPLETE CBC W/AUTO DIFF WBC: CPT

## 2023-07-19 PROCEDURE — 80053 COMPREHEN METABOLIC PANEL: CPT

## 2023-07-19 PROCEDURE — 82306 VITAMIN D 25 HYDROXY: CPT

## 2023-07-19 PROCEDURE — 80061 LIPID PANEL: CPT

## 2023-07-19 PROCEDURE — 36415 COLL VENOUS BLD VENIPUNCTURE: CPT

## 2023-07-26 ENCOUNTER — OFFICE VISIT (OUTPATIENT)
Dept: FAMILY MEDICINE CLINIC | Facility: CLINIC | Age: 73
End: 2023-07-26
Payer: MEDICARE

## 2023-07-26 VITALS
BODY MASS INDEX: 25.89 KG/M2 | RESPIRATION RATE: 14 BRPM | HEART RATE: 72 BPM | SYSTOLIC BLOOD PRESSURE: 128 MMHG | HEIGHT: 65 IN | OXYGEN SATURATION: 97 % | TEMPERATURE: 97.3 F | WEIGHT: 155.4 LBS | DIASTOLIC BLOOD PRESSURE: 84 MMHG

## 2023-07-26 DIAGNOSIS — E55.9 VITAMIN D DEFICIENCY: ICD-10-CM

## 2023-07-26 DIAGNOSIS — E78.01 FAMILIAL HYPERCHOLESTEROLEMIA: ICD-10-CM

## 2023-07-26 DIAGNOSIS — M81.0 AGE-RELATED OSTEOPOROSIS WITHOUT CURRENT PATHOLOGICAL FRACTURE: ICD-10-CM

## 2023-07-26 DIAGNOSIS — Z00.00 MEDICARE ANNUAL WELLNESS VISIT, SUBSEQUENT: ICD-10-CM

## 2023-07-26 DIAGNOSIS — R73.02 IGT (IMPAIRED GLUCOSE TOLERANCE): Primary | ICD-10-CM

## 2023-07-26 DIAGNOSIS — E07.9 DISORDER OF THYROID, UNSPECIFIED: ICD-10-CM

## 2023-07-26 DIAGNOSIS — R79.89 ABNORMAL TSH: ICD-10-CM

## 2023-07-26 PROCEDURE — G0439 PPPS, SUBSEQ VISIT: HCPCS | Performed by: FAMILY MEDICINE

## 2023-07-26 PROCEDURE — 99213 OFFICE O/P EST LOW 20 MIN: CPT | Performed by: FAMILY MEDICINE

## 2023-07-26 NOTE — PROGRESS NOTES
Assessment and Plan:     Problem List Items Addressed This Visit        Other    Hyperlipidemia    Relevant Orders    Lipid Panel with Direct LDL reflex    Vitamin D deficiency   Other Visit Diagnoses     IGT (impaired glucose tolerance)    -  Primary    Delmy Martínez is doing well. She is t continue a healthy, lower carb diet, and remain active. Repeat A1c with other FBW prior to next OV. Relevant Orders    HEMOGLOBIN A1C W/ EAG ESTIMATION    Comprehensive metabolic panel    Medicare annual wellness visit, subsequent        Delmy Martínez is vaccinated against COV-19. Colonoscopy is UTD. Abnormal TSH        Stable - will recheck TSH, etc, with next labs. Likely sub-clinical hypothyroidism at this time. Relevant Orders    TSH, 3rd generation with Free T4 reflex    Thyroid Antibodies Panel    Age-related osteoporosis without current pathological fracture        Will add pt to PA list for Prolia. Disorder of thyroid, unspecified        Relevant Orders    TSH, 3rd generation with Free T4 reflex        BMI Counseling: Body mass index is 25.89 kg/m². The BMI is above normal. Nutrition recommendations include moderation in carbohydrate intake. Exercise recommendations include exercising 3-5 times per week. No pharmacotherapy was ordered. Patient referred to PCP. Rationale for BMI follow-up plan is due to patient being overweight or obese. Depression Screening and Follow-up Plan: Patient was screened for depression during today's encounter. They screened negative with a PHQ-2 score of 0. Preventive health issues were discussed with patient, and age appropriate screening tests were ordered as noted in patient's After Visit Summary. Personalized health advice and appropriate referrals for health education or preventive services given if needed, as noted in patient's After Visit Summary. History of Present Illness:     Patient presents for a Medicare Wellness Visit    AWV Visit today.   Vaccinated against COV-19; had Prevnar vaccines. We discussed her receiving the Shingrix Series and Td at her pharmacy. Had colonoscopy approx 2.5 years ago in 2021 - hx of tiny perforation of colon. Hx of diverticulosis. Had a normal mammogram in 11/2022 with GYN. Recent labs discussed - Vit 102, TSH 7.7, FT4 normal at 0.81, Gluc 99, GFR 58, LDL 88, H/H reassuring. Patient Care Team:  Ector Jones DO as PCP - General (Family Medicine)     Review of Systems:     Review of Systems   Constitutional: Negative for activity change. Respiratory: Negative for shortness of breath. Cardiovascular: Negative for chest pain. Gastrointestinal: Negative for abdominal pain and blood in stool. Genitourinary: Negative for vaginal bleeding. No new breast lumps on self-examination. Psychiatric/Behavioral: Negative for dysphoric mood. The patient is not nervous/anxious.          Problem List:     Patient Active Problem List   Diagnosis   • Diverticulosis   • Esophageal reflux   • Hyperlipidemia   • Osteopenia   • Vitamin D deficiency   • Prediabetes   • Blood in stool   • History of GI diverticular bleed   • Diarrhea   • H/O hernia repair   • Postoperative visit   • History of colon polyps      Past Medical and Surgical History:     Past Medical History:   Diagnosis Date   • Cervical cancer ([address])     last assessed 09Apr2014   • Diverticulosis    • Ear problems    • HL (hearing loss)    • Hyperlipemia    • Tinnitus      Past Surgical History:   Procedure Laterality Date   • BREAST BIOPSY Left    • COLON SURGERY      Bowel perforation   • COLONOSCOPY      (Fiberoptic)   • EXPLORATORY LAPAROTOMY W/ BOWEL RESECTION N/A 01/21/2021    Procedure: LAPAROTOMY EXPLORATORY W/ REPAIR OF UMBLICAL HERNIA;  Surgeon: Rafiq Gregory MD;  [facility];  Service: General   • HERNIA REPAIR     • HYSTERECTOMY     • NE LAPAROSCOPY SURG RPR INITIAL INGUINAL HERNIA Right 01/06/2021    Procedure: ROBOTIC INGUINAL HERNIA REPAIR;  Surgeon: Nettie Rider MD;  [facility];  Service: General      Family History:     Family History   Problem Relation Age of Onset   • Breast cancer Mother    • Ovarian cancer Maternal Grandmother    • Breast cancer Family    • Thyroid disease Family    • Breast cancer Paternal Aunt    • Breast cancer Cousin    • Breast cancer Daughter       Social History:     Social History     Socioeconomic History   • Marital status: /Civil Union     Spouse name: None   • Number of children: None   • Years of education: None   • Highest education level: None   Occupational History   • None   Tobacco Use   • Smoking status: Never   • Smokeless tobacco: Never   Vaping Use   • Vaping Use: Never used   Substance and Sexual Activity   • Alcohol use: Not Currently     Comment: holidays - wine   • Drug use: No   • Sexual activity: Yes     Partners: Male   Other Topics Concern   • None   Social History Narrative    denied caffeine use    H/o exercising regularly     Social Determinants of Health     Financial Resource Strain: Low Risk  (7/26/2023)    Overall Financial Resource Strain (CARDIA)    • Difficulty of Paying Living Expenses: Not hard at all   Food Insecurity: Not on file   Transportation Needs: No Transportation Needs (7/26/2023)    PRAPARE - Transportation    • Lack of Transportation (Medical): No    • Lack of Transportation (Non-Medical): No   Physical Activity: Not on file   Stress: Not on file   Social Connections: Not on file   Intimate Partner Violence: Not on file   Housing Stability: Not on file      Medications and Allergies:     Current Outpatient Medications   Medication Sig Dispense Refill   • atorvastatin (LIPITOR) 10 mg tablet TAKE 1 TABLET BY MOUTH EVERY DAY 90 tablet 3   • Calcium Carb-Cholecalciferol 1000-800 MG-UNIT TABS Take by mouth     • Probiotic Product (PROBIOTIC-10) CAPS Take by mouth       No current facility-administered medications for this visit.      Allergies   Allergen Reactions   • Codeine Hives      Immunizations:     Immunization History   Administered Date(s) Administered   • COVID-19 MODERNA VACC 0.25 ML IM BOOSTER 11/01/2021   • COVID-19 MODERNA VACC 0.5 ML IM 02/19/2021, 03/19/2021   • Influenza Quadrivalent, 6-35 Months IM 11/11/2015   • Influenza Split High Dose Preservative Free IM 11/23/2016, 11/27/2017   • Influenza, high dose seasonal 0.7 mL 10/08/2019, 10/20/2020, 11/01/2021, 12/19/2022   • Influenza, seasonal, injectable 11/21/2012   • Pneumococcal Conjugate 13-Valent 11/11/2015   • Pneumococcal Polysaccharide PPV23 04/11/2014, 11/11/2015      Health Maintenance:         Topic Date Due   • Breast Cancer Screening: Mammogram  11/10/2023   • Colorectal Cancer Screening  07/29/2031   • Hepatitis C Screening  Completed         Topic Date Due   • COVID-19 Vaccine (4 - Moderna series) 12/27/2021   • Influenza Vaccine (1) 09/01/2023      Medicare Screening Tests and Risk Assessments:     Altaf Vaughan is here for her Subsequent Wellness visit. Last Medicare Wellness visit information reviewed, patient interviewed, no change since last AWV. Health Risk Assessment:   Patient rates overall health as excellent. Patient feels that their physical health rating is much better. Patient is very satisfied with their life. Eyesight was rated as same. Hearing was rated as same. Patient feels that their emotional and mental health rating is much better. Patients states they are never, rarely angry. Patient states they are never, rarely unusually tired/fatigued. Pain experienced in the last 7 days has been none. Patient states that she has experienced no weight loss or gain in last 6 months. Depression Screening:   PHQ-2 Score: 0      Fall Risk Screening: In the past year, patient has experienced: no history of falling in past year      Urinary Incontinence Screening:   Patient has not leaked urine accidently in the last six months.      Home Safety:  Patient does not have trouble with stairs inside or outside of their home. Patient has working smoke alarms and has no working carbon monoxide detector. Home safety hazards include: none. Nutrition:   Current diet is Regular, Low Saturated Fat, No Added Salt and Limited junk food. Medications:   Patient is not currently taking any over-the-counter supplements. Patient is able to manage medications. Activities of Daily Living (ADLs)/Instrumental Activities of Daily Living (IADLs):   Walk and transfer into and out of bed and chair?: Yes  Dress and groom yourself?: Yes    Bathe or shower yourself?: Yes    Feed yourself? Yes  Do your laundry/housekeeping?: Yes  Manage your money, pay your bills and track your expenses?: Yes  Make your own meals?: Yes    Do your own shopping?: Yes    Previous Hospitalizations:   Any hospitalizations or ED visits within the last 12 months?: No      Advance Care Planning:   Living will: Yes    Durable POA for healthcare: Yes    Advanced directive: Yes    End of Life Decisions reviewed with patient: Yes      Cognitive Screening:   Provider or family/friend/caregiver concerned regarding cognition?: No    PREVENTIVE SCREENINGS      Cardiovascular Screening:    General: Screening Not Indicated and History Lipid Disorder      Diabetes Screening:     General: Screening Current      Colorectal Cancer Screening:     General: Screening Current      Breast Cancer Screening:     General: Screening Current      Cervical Cancer Screening:    General: History Cervical Cancer      Osteoporosis Screening:    General: Screening Not Indicated and History Osteoporosis      Lung Cancer Screening:     General: Screening Not Indicated      Hepatitis C Screening:    General: Screening Current    Screening, Brief Intervention, and Referral to Treatment (SBIRT)    Screening  Typical number of drinks in a day: 0  Typical number of drinks in a week: 1  Interpretation: Low risk drinking behavior.     AUDIT-C Screenin) How often did you have a drink containing alcohol in the past year? monthly or less  2) How many drinks did you have on a typical day when you were drinking in the past year? 0  3) How often did you have 6 or more drinks on one occasion in the past year? never    AUDIT-C Score: 1  Interpretation: Score 0-2 (female): Negative screen for alcohol misuse    Single Item Drug Screening:  How often have you used an illegal drug (including marijuana) or a prescription medication for non-medical reasons in the past year? never    Single Item Drug Screen Score: 0  Interpretation: Negative screen for possible drug use disorder    No results found. Physical Exam:     /84 (BP Location: Left arm, Patient Position: Sitting, Cuff Size: Standard)   Pulse 72   Temp (!) 97.3 °F (36.3 °C) (Tympanic)   Resp 14   Ht 5' 4.96" (1.65 m)   Wt 70.5 kg (155 lb 6.4 oz)   SpO2 97%   BMI 25.89 kg/m²     Physical Exam  Vitals and nursing note reviewed. Constitutional:       General: She is not in acute distress. Appearance: Normal appearance. She is not ill-appearing, toxic-appearing or diaphoretic. HENT:      Head: Normocephalic and atraumatic. Mouth/Throat:      Mouth: Mucous membranes are moist.      Pharynx: Oropharynx is clear. No oropharyngeal exudate or posterior oropharyngeal erythema. Eyes:      General: No scleral icterus. Conjunctiva/sclera: Conjunctivae normal.   Cardiovascular:      Rate and Rhythm: Normal rate and regular rhythm. Heart sounds: Normal heart sounds. No murmur heard. No friction rub. No gallop. Pulmonary:      Effort: Pulmonary effort is normal. No respiratory distress. Breath sounds: Normal breath sounds. No stridor. No wheezing, rhonchi or rales. Abdominal:      General: Abdomen is flat. Bowel sounds are normal. There is no distension. Palpations: Abdomen is soft. There is no mass. Tenderness: There is no abdominal tenderness. There is no guarding or rebound.    Musculoskeletal: Cervical back: Normal range of motion and neck supple. No rigidity or tenderness. Lymphadenopathy:      Cervical: No cervical adenopathy. Neurological:      Mental Status: She is alert and oriented to person, place, and time. Psychiatric:         Mood and Affect: Mood normal.         Behavior: Behavior normal.         Thought Content: Thought content normal.         Judgment: Judgment normal.          Aimee was seen today for medicare wellness visit. Diagnoses and all orders for this visit:    IGT (impaired glucose tolerance)  Comments:  Dunia Talley is doing well. She is t continue a healthy, lower carb diet, and remain active. Repeat A1c with other FBW prior to next OV. Orders:  -     HEMOGLOBIN A1C W/ EAG ESTIMATION; Future  -     Comprehensive metabolic panel; Future    Medicare annual wellness visit, subsequent  Comments:  Dunia Talley is vaccinated against COV-19. Colonoscopy is UTD. Familial hypercholesterolemia  Comments:  Controlled with Atorvastatin. Orders:  -     Lipid Panel with Direct LDL reflex; Future    Vitamin D deficiency  Comments:  Stable - discussed with Aimee backing down some on her Vit D3 supplement dosing. Abnormal TSH  Comments:  Stable - will recheck TSH, etc, with next labs. Likely sub-clinical hypothyroidism at this time. Orders:  -     TSH, 3rd generation with Free T4 reflex; Future  -     Thyroid Antibodies Panel; Future    Age-related osteoporosis without current pathological fracture  Comments: Will add pt to PA list for Prolia. Disorder of thyroid, unspecified  -     TSH, 3rd generation with Free T4 reflex;  Future          [address] [facility], DO

## 2023-12-19 ENCOUNTER — TELEPHONE (OUTPATIENT)
Age: 73
End: 2023-12-19

## 2023-12-19 NOTE — TELEPHONE ENCOUNTER
Medication is not on current active med list. Please review if refill is appropriate    Reason for call:   [x] Refill   [] Prior Auth  [] Other:     Office:   [x] PCP/Provider -   [] Specialty/Provider -     Medication:   Famotidine 20mg- Take 1 tablet by mouth twice a day      Pharmacy: [facility] [place]    Does the patient have enough for 3 days?   [] Yes   [x] No - Send as HP to POD

## 2023-12-21 DIAGNOSIS — K21.9 GASTROESOPHAGEAL REFLUX DISEASE, UNSPECIFIED WHETHER ESOPHAGITIS PRESENT: ICD-10-CM

## 2023-12-21 RX ORDER — FAMOTIDINE 20 MG/1
20 TABLET, FILM COATED ORAL 2 TIMES DAILY
Qty: 60 TABLET | Refills: 1 | Status: SHIPPED | OUTPATIENT
Start: 2023-12-21

## 2023-12-21 NOTE — TELEPHONE ENCOUNTER
Dr. Griffin,    Famotidine 20mg-Take 1 tablet by mouth twice daily-last refilled 3/15/23, not on current medication list, and next OV with Dr. Yoon 01/25/2024

## 2024-01-13 DIAGNOSIS — K21.9 GASTROESOPHAGEAL REFLUX DISEASE, UNSPECIFIED WHETHER ESOPHAGITIS PRESENT: ICD-10-CM

## 2024-01-15 RX ORDER — FAMOTIDINE 20 MG/1
20 TABLET, FILM COATED ORAL 2 TIMES DAILY
Qty: 180 TABLET | Refills: 1 | Status: SHIPPED | OUTPATIENT
Start: 2024-01-15

## 2024-01-20 ENCOUNTER — APPOINTMENT (OUTPATIENT)
Dept: LAB | Facility: CLINIC | Age: 74
End: 2024-01-20
Payer: COMMERCIAL

## 2024-01-20 DIAGNOSIS — E78.01 FAMILIAL HYPERCHOLESTEROLEMIA: ICD-10-CM

## 2024-01-20 DIAGNOSIS — E07.9 DISORDER OF THYROID, UNSPECIFIED: ICD-10-CM

## 2024-01-20 DIAGNOSIS — R79.89 ABNORMAL TSH: ICD-10-CM

## 2024-01-20 DIAGNOSIS — R73.02 IGT (IMPAIRED GLUCOSE TOLERANCE): ICD-10-CM

## 2024-01-20 LAB
ALBUMIN SERPL BCP-MCNC: 4.3 G/DL (ref 3.5–5)
ALP SERPL-CCNC: 63 U/L (ref 34–104)
ALT SERPL W P-5'-P-CCNC: 16 U/L (ref 7–52)
ANION GAP SERPL CALCULATED.3IONS-SCNC: 5 MMOL/L
AST SERPL W P-5'-P-CCNC: 17 U/L (ref 13–39)
BILIRUB SERPL-MCNC: 0.8 MG/DL (ref 0.2–1)
BUN SERPL-MCNC: 17 MG/DL (ref 5–25)
CALCIUM SERPL-MCNC: 9.6 MG/DL (ref 8.4–10.2)
CHLORIDE SERPL-SCNC: 105 MMOL/L (ref 96–108)
CHOLEST SERPL-MCNC: 172 MG/DL
CO2 SERPL-SCNC: 30 MMOL/L (ref 21–32)
CREAT SERPL-MCNC: 0.84 MG/DL (ref 0.6–1.3)
EST. AVERAGE GLUCOSE BLD GHB EST-MCNC: 120 MG/DL
GFR SERPL CREATININE-BSD FRML MDRD: 68 ML/MIN/1.73SQ M
GLUCOSE P FAST SERPL-MCNC: 99 MG/DL (ref 65–99)
HBA1C MFR BLD: 5.8 %
HDLC SERPL-MCNC: 65 MG/DL
LDLC SERPL CALC-MCNC: 90 MG/DL (ref 0–100)
POTASSIUM SERPL-SCNC: 4.3 MMOL/L (ref 3.5–5.3)
PROT SERPL-MCNC: 7.1 G/DL (ref 6.4–8.4)
SODIUM SERPL-SCNC: 140 MMOL/L (ref 135–147)
T4 FREE SERPL-MCNC: 0.75 NG/DL (ref 0.61–1.12)
TRIGL SERPL-MCNC: 87 MG/DL
TSH SERPL DL<=0.05 MIU/L-ACNC: 5.23 UIU/ML (ref 0.45–4.5)

## 2024-01-20 PROCEDURE — 84443 ASSAY THYROID STIM HORMONE: CPT

## 2024-01-20 PROCEDURE — 84439 ASSAY OF FREE THYROXINE: CPT

## 2024-01-20 PROCEDURE — 86800 THYROGLOBULIN ANTIBODY: CPT

## 2024-01-20 PROCEDURE — 86376 MICROSOMAL ANTIBODY EACH: CPT

## 2024-01-20 PROCEDURE — 80053 COMPREHEN METABOLIC PANEL: CPT

## 2024-01-20 PROCEDURE — 80061 LIPID PANEL: CPT

## 2024-01-20 PROCEDURE — 36415 COLL VENOUS BLD VENIPUNCTURE: CPT

## 2024-01-20 PROCEDURE — 83036 HEMOGLOBIN GLYCOSYLATED A1C: CPT

## 2024-01-21 LAB — THYROPEROXIDASE AB SERPL-ACNC: 56 IU/ML (ref 0–34)

## 2024-01-23 LAB — THYROGLOB AB SERPL-ACNC: <1 IU/ML (ref 0–0.9)

## 2024-01-24 ENCOUNTER — TELEPHONE (OUTPATIENT)
Dept: ADMINISTRATIVE | Facility: OTHER | Age: 74
End: 2024-01-24

## 2024-01-24 NOTE — TELEPHONE ENCOUNTER
01/24/24 3:22 PM    Patient contacted (spoke with patient) to bring Advance Directive, POLST, or Living Will document to next scheduled pcp visit.    Thank you.  Lynn Page [facility]

## 2024-01-25 ENCOUNTER — OFFICE VISIT (OUTPATIENT)
Dept: FAMILY MEDICINE CLINIC | Facility: CLINIC | Age: 74
End: 2024-01-25
Payer: COMMERCIAL

## 2024-01-25 VITALS
SYSTOLIC BLOOD PRESSURE: 116 MMHG | TEMPERATURE: 96.1 F | HEIGHT: 65 IN | RESPIRATION RATE: 16 BRPM | HEART RATE: 85 BPM | WEIGHT: 152.2 LBS | BODY MASS INDEX: 25.36 KG/M2 | OXYGEN SATURATION: 100 % | DIASTOLIC BLOOD PRESSURE: 70 MMHG

## 2024-01-25 DIAGNOSIS — R73.01 IMPAIRED FASTING GLUCOSE: ICD-10-CM

## 2024-01-25 DIAGNOSIS — Z12.31 ENCOUNTER FOR SCREENING MAMMOGRAM FOR MALIGNANT NEOPLASM OF BREAST: ICD-10-CM

## 2024-01-25 DIAGNOSIS — E03.8 SUBCLINICAL HYPOTHYROIDISM: ICD-10-CM

## 2024-01-25 DIAGNOSIS — E78.01 FAMILIAL HYPERCHOLESTEROLEMIA: ICD-10-CM

## 2024-01-25 DIAGNOSIS — M81.0 AGE-RELATED OSTEOPOROSIS WITHOUT CURRENT PATHOLOGICAL FRACTURE: ICD-10-CM

## 2024-01-25 DIAGNOSIS — K21.9 GASTROESOPHAGEAL REFLUX DISEASE, UNSPECIFIED WHETHER ESOPHAGITIS PRESENT: Primary | ICD-10-CM

## 2024-01-25 PROCEDURE — 99214 OFFICE O/P EST MOD 30 MIN: CPT | Performed by: FAMILY MEDICINE

## 2024-01-25 NOTE — ASSESSMENT & PLAN NOTE
Lab Results   Component Value Date    NAB7QSSBUSUC 5.234 (H) 01/20/2024    FREET4 0.75 01/20/2024     - Continues to be asymptomatic.  Antimicrosomal antibody elevated.  -Discussed starting levothyroxine.  Patient would like to hold off for now.  Discussed risk benefits.  -Will do ultrasound of thyroid  -Discussed dietary and lifestyle recommendations  -Follow-up in 6 months with repeat blood work.

## 2024-01-25 NOTE — ASSESSMENT & PLAN NOTE
- Most recent DEXA June 2022.  Discussed the risks of osteoporosis.  Patient would like to continue off medication for now.  -Continue calcium and vitamin D supplementation.  -Continue resistance training and exercise regimen as discussed.

## 2024-01-25 NOTE — ASSESSMENT & PLAN NOTE
- Well-controlled.  Continue current management Pepcid 20 mg twice daily  -Continue antireflux measures.

## 2024-04-19 ENCOUNTER — HOSPITAL ENCOUNTER (OUTPATIENT)
Dept: RADIOLOGY | Age: 74
Discharge: HOME | End: 2024-04-19
Payer: COMMERCIAL

## 2024-04-19 VITALS — BODY MASS INDEX: 25.33 KG/M2 | HEIGHT: 65 IN | WEIGHT: 152 LBS

## 2024-04-19 DIAGNOSIS — E03.8 SUBCLINICAL HYPOTHYROIDISM: ICD-10-CM

## 2024-04-19 DIAGNOSIS — Z12.31 ENCOUNTER FOR SCREENING MAMMOGRAM FOR MALIGNANT NEOPLASM OF BREAST: ICD-10-CM

## 2024-04-19 PROCEDURE — 77063 BREAST TOMOSYNTHESIS BI: CPT

## 2024-04-19 PROCEDURE — 76536 US EXAM OF HEAD AND NECK: CPT

## 2024-04-19 PROCEDURE — 77067 SCR MAMMO BI INCL CAD: CPT

## 2024-07-08 DIAGNOSIS — E78.2 MIXED HYPERLIPIDEMIA: ICD-10-CM

## 2024-07-08 DIAGNOSIS — K21.9 GASTROESOPHAGEAL REFLUX DISEASE, UNSPECIFIED WHETHER ESOPHAGITIS PRESENT: ICD-10-CM

## 2024-07-08 RX ORDER — ATORVASTATIN CALCIUM 10 MG/1
10 TABLET, FILM COATED ORAL DAILY
Qty: 100 TABLET | Refills: 1 | Status: SHIPPED | OUTPATIENT
Start: 2024-07-08

## 2024-07-08 RX ORDER — FAMOTIDINE 20 MG/1
20 TABLET, FILM COATED ORAL 2 TIMES DAILY
Qty: 200 TABLET | Refills: 1 | Status: SHIPPED | OUTPATIENT
Start: 2024-07-08

## 2024-07-08 NOTE — TELEPHONE ENCOUNTER
Reason for call:   [x] Refill   [] Prior Auth  [] Other:     Office:   [x] PCP/Provider - Car/Shan HARTMANN  [] Specialty/Provider -           Pharmacy: [facility] [id number] [place] [facility] [place] [address] [phone]     Does the patient have enough for 3 days?   [x] Yes   [] No - Send as HP to POD

## 2024-07-20 ENCOUNTER — APPOINTMENT (OUTPATIENT)
Dept: LAB | Facility: CLINIC | Age: 74
End: 2024-07-20
Payer: COMMERCIAL

## 2024-07-20 DIAGNOSIS — E03.8 SUBCLINICAL HYPOTHYROIDISM: ICD-10-CM

## 2024-07-20 DIAGNOSIS — R73.01 IMPAIRED FASTING GLUCOSE: ICD-10-CM

## 2024-07-20 DIAGNOSIS — E78.01 FAMILIAL HYPERCHOLESTEROLEMIA: ICD-10-CM

## 2024-07-20 LAB
ALBUMIN SERPL BCG-MCNC: 4.3 G/DL (ref 3.5–5)
ALP SERPL-CCNC: 60 U/L (ref 34–104)
ALT SERPL W P-5'-P-CCNC: 11 U/L (ref 7–52)
ANION GAP SERPL CALCULATED.3IONS-SCNC: 6 MMOL/L (ref 4–13)
AST SERPL W P-5'-P-CCNC: 14 U/L (ref 13–39)
BILIRUB SERPL-MCNC: 0.78 MG/DL (ref 0.2–1)
BUN SERPL-MCNC: 20 MG/DL (ref 5–25)
CALCIUM SERPL-MCNC: 9.7 MG/DL (ref 8.4–10.2)
CHLORIDE SERPL-SCNC: 105 MMOL/L (ref 96–108)
CHOLEST SERPL-MCNC: 150 MG/DL
CO2 SERPL-SCNC: 30 MMOL/L (ref 21–32)
CREAT SERPL-MCNC: 0.91 MG/DL (ref 0.6–1.3)
EST. AVERAGE GLUCOSE BLD GHB EST-MCNC: 123 MG/DL
GFR SERPL CREATININE-BSD FRML MDRD: 61 ML/MIN/1.73SQ M
GLUCOSE P FAST SERPL-MCNC: 90 MG/DL (ref 65–99)
HBA1C MFR BLD: 5.9 %
HDLC SERPL-MCNC: 60 MG/DL
LDLC SERPL CALC-MCNC: 73 MG/DL (ref 0–100)
POTASSIUM SERPL-SCNC: 4.6 MMOL/L (ref 3.5–5.3)
PROT SERPL-MCNC: 7.1 G/DL (ref 6.4–8.4)
SODIUM SERPL-SCNC: 141 MMOL/L (ref 135–147)
T4 FREE SERPL-MCNC: 0.68 NG/DL (ref 0.61–1.12)
TRIGL SERPL-MCNC: 87 MG/DL
TSH SERPL DL<=0.05 MIU/L-ACNC: 5.93 UIU/ML (ref 0.45–4.5)

## 2024-07-20 PROCEDURE — 80053 COMPREHEN METABOLIC PANEL: CPT

## 2024-07-20 PROCEDURE — 80061 LIPID PANEL: CPT

## 2024-07-20 PROCEDURE — 84443 ASSAY THYROID STIM HORMONE: CPT

## 2024-07-20 PROCEDURE — 84439 ASSAY OF FREE THYROXINE: CPT

## 2024-07-20 PROCEDURE — 36415 COLL VENOUS BLD VENIPUNCTURE: CPT

## 2024-07-20 PROCEDURE — 83036 HEMOGLOBIN GLYCOSYLATED A1C: CPT

## 2024-07-26 ENCOUNTER — TELEPHONE (OUTPATIENT)
Dept: FAMILY MEDICINE CLINIC | Facility: CLINIC | Age: 74
End: 2024-07-26

## 2024-07-26 NOTE — TELEPHONE ENCOUNTER
Patient was called and made aware of results.      ----- Message from Jayant Yoon, DO sent at 7/25/2024  4:24 PM EDT -----  Can we please call Aimee and let her know that thyroid ultrasound shows small cyst on the right side upper thyroid.  This is a benign fluid-filled area.  We just monitor it.  There are no other nodules.  Radiologist does not recommend any current follow-up.  If there were any changes we would discuss continued imaging as needed.

## 2024-07-30 ENCOUNTER — OFFICE VISIT (OUTPATIENT)
Dept: FAMILY MEDICINE CLINIC | Facility: CLINIC | Age: 74
End: 2024-07-30
Payer: COMMERCIAL

## 2024-07-30 VITALS
HEIGHT: 66 IN | TEMPERATURE: 96.5 F | SYSTOLIC BLOOD PRESSURE: 106 MMHG | DIASTOLIC BLOOD PRESSURE: 76 MMHG | WEIGHT: 150.2 LBS | OXYGEN SATURATION: 98 % | RESPIRATION RATE: 16 BRPM | BODY MASS INDEX: 24.14 KG/M2 | HEART RATE: 86 BPM

## 2024-07-30 DIAGNOSIS — Z00.00 MEDICARE ANNUAL WELLNESS VISIT, SUBSEQUENT: Primary | ICD-10-CM

## 2024-07-30 DIAGNOSIS — H61.22 IMPACTED CERUMEN OF LEFT EAR: ICD-10-CM

## 2024-07-30 DIAGNOSIS — M81.0 AGE-RELATED OSTEOPOROSIS WITHOUT CURRENT PATHOLOGICAL FRACTURE: ICD-10-CM

## 2024-07-30 DIAGNOSIS — R73.01 IMPAIRED FASTING GLUCOSE: ICD-10-CM

## 2024-07-30 DIAGNOSIS — E03.8 SUBCLINICAL HYPOTHYROIDISM: ICD-10-CM

## 2024-07-30 PROCEDURE — 1125F AMNT PAIN NOTED PAIN PRSNT: CPT | Performed by: FAMILY MEDICINE

## 2024-07-30 PROCEDURE — 3288F FALL RISK ASSESSMENT DOCD: CPT | Performed by: FAMILY MEDICINE

## 2024-07-30 PROCEDURE — 3725F SCREEN DEPRESSION PERFORMED: CPT | Performed by: FAMILY MEDICINE

## 2024-07-30 PROCEDURE — 69210 REMOVE IMPACTED EAR WAX UNI: CPT | Performed by: FAMILY MEDICINE

## 2024-07-30 PROCEDURE — 1170F FXNL STATUS ASSESSED: CPT | Performed by: FAMILY MEDICINE

## 2024-07-30 PROCEDURE — G0439 PPPS, SUBSEQ VISIT: HCPCS | Performed by: FAMILY MEDICINE

## 2024-07-30 PROCEDURE — 99213 OFFICE O/P EST LOW 20 MIN: CPT | Performed by: FAMILY MEDICINE

## 2024-07-30 NOTE — PROGRESS NOTES
Ambulatory Visit  Name: Amiee Fernández      : 1950      MRN: [id number]  Encounter Provider: Jayant Yoon DO  Encounter Date: 2024   Encounter department: [facility]    Assessment & Plan   1. Medicare annual wellness visit, subsequent  2. Age-related osteoporosis without current pathological fracture  -     DXA bone density spine hip and pelvis; Future; Expected date: 2024  3. Impaired fasting glucose  -     Comprehensive metabolic panel; Future  -     Hemoglobin A1C; Future  4. Subclinical hypothyroidism  -     TSH, 3rd generation with Free T4 reflex; Future  5. Impacted cerumen of left ear  Assessment & Plan:  - Diminished hearing on the left.  Has had significant cerumen impaction previously  -Impaction removal per procedure note.  -Otherwise well with no complications.  Hearing improved.       Preventive health issues were discussed with patient, and age appropriate screening tests were ordered as noted in patient's After Visit Summary. Personalized health advice and appropriate referrals for health education or preventive services given if needed, as noted in patient's After Visit Summary.    History of Present Illness     HPI   Patient Care Team:  Jayant Yoon DO as PCP - General (Family Medicine)    Review of Systems   Constitutional:  Negative for chills and fever.   HENT:  Negative for congestion, ear pain, postnasal drip, rhinorrhea, sinus pressure, sinus pain and sore throat.         Ear fullness and decreased hearing   Eyes:  Negative for pain and visual disturbance.   Respiratory:  Negative for cough and shortness of breath.    Cardiovascular:  Negative for chest pain and palpitations.   Gastrointestinal:  Negative for abdominal pain and vomiting.   Endocrine: Negative for polydipsia and polyuria.   Genitourinary:  Negative for dysuria and hematuria.   Musculoskeletal:  Negative for arthralgias and back pain.   Skin:  Negative for color change and rash.    Neurological:  Negative for dizziness, seizures, syncope and headaches.   Psychiatric/Behavioral:  Negative for confusion and sleep disturbance. The patient is not nervous/anxious.    All other systems reviewed and are negative.    Medical History Reviewed by provider this encounter:       Annual Wellness Visit Questionnaire   Aimee is here for her Subsequent Wellness visit. Last Medicare Wellness visit information reviewed, patient interviewed and updates made to the record today.      Health Risk Assessment:   Patient rates overall health as very good. Patient feels that their physical health rating is same. Patient is very satisfied with their life. Eyesight was rated as same. Hearing was rated as slightly worse. Patient feels that their emotional and mental health rating is same. Patients states they are never, rarely angry. Patient states they are never, rarely unusually tired/fatigued. Pain experienced in the last 7 days has been none. Patient states that she has experienced no weight loss or gain in last 6 months.     Depression Screening:   PHQ-2 Score: 0      Fall Risk Screening:   In the past year, patient has experienced: no history of falling in past year      Urinary Incontinence Screening:   Patient has not leaked urine accidently in the last six months.     Home Safety:  Patient does not have trouble with stairs inside or outside of their home. Patient has working smoke alarms and has no working carbon monoxide detector. Home safety hazards include: none.     Nutrition:   Current diet is Regular, Low Saturated Fat, No Added Salt and Limited junk food.     Medications:   Patient is not currently taking any over-the-counter supplements. Patient is able to manage medications.     Activities of Daily Living (ADLs)/Instrumental Activities of Daily Living (IADLs):   Walk and transfer into and out of bed and chair?: Yes  Dress and groom yourself?: Yes    Bathe or shower yourself?: Yes    Feed yourself?  Yes  Do your laundry/housekeeping?: Yes  Manage your money, pay your bills and track your expenses?: Yes  Make your own meals?: Yes    Do your own shopping?: Yes    Previous Hospitalizations:   Any hospitalizations or ED visits within the last 12 months?: No      Advance Care Planning:   Living will: Yes    Durable POA for healthcare: Yes    Advanced directive: Yes      Cognitive Screening:   Provider or family/friend/caregiver concerned regarding cognition?: No    PREVENTIVE SCREENINGS      Cardiovascular Screening:    General: Screening Not Indicated and History Lipid Disorder      Diabetes Screening:     General: Screening Current      Colorectal Cancer Screening:     General: Screening Current      Breast Cancer Screening:     General: Screening Current      Cervical Cancer Screening:    General: History Cervical Cancer      Osteoporosis Screening:    General: Screening Not Indicated and History Osteoporosis      Lung Cancer Screening:     General: Screening Not Indicated      Hepatitis C Screening:    General: Screening Current    Screening, Brief Intervention, and Referral to Treatment (SBIRT)    Screening  Typical number of drinks in a day: 0  Typical number of drinks in a week: 0  Interpretation: Low risk drinking behavior.    AUDIT-C Screenin) How often did you have a drink containing alcohol in the past year? monthly or less  2) How many drinks did you have on a typical day when you were drinking in the past year? 1 to 2  3) How often did you have 6 or more drinks on one occasion in the past year? never    AUDIT-C Score: 1  Interpretation: Score 0-2 (female): Negative screen for alcohol misuse    Single Item Drug Screening:  How often have you used an illegal drug (including marijuana) or a prescription medication for non-medical reasons in the past year? never    Single Item Drug Screen Score: 0  Interpretation: Negative screen for possible drug use disorder    Social Determinants of Health  "    Financial Resource Strain: Low Risk  (7/26/2023)    Overall Financial Resource Strain (CARDIA)     Difficulty of Paying Living Expenses: Not hard at all   Food Insecurity: No Food Insecurity (7/30/2024)    Hunger Vital Sign     Worried About Running Out of Food in the Last Year: Never true     Ran Out of Food in the Last Year: Never true   Transportation Needs: No Transportation Needs (7/30/2024)    PRAPARE - Transportation     Lack of Transportation (Medical): No     Lack of Transportation (Non-Medical): No   Housing Stability: Low Risk  (7/30/2024)    Housing Stability Vital Sign     Unable to Pay for Housing in the Last Year: No     Number of Times Moved in the Last Year: 0     Homeless in the Last Year: No   Utilities: Not At Risk (7/30/2024)    [facility] Utilities     Threatened with loss of utilities: No     No results found.    Objective     /76 (BP Location: Left arm, Patient Position: Sitting, Cuff Size: Standard)   Pulse 86   Temp (!) 96.5 °F (35.8 °C) (Tympanic)   Resp 16   Ht 5' 6.22\" (1.682 m)   Wt 68.1 kg (150 lb 3.2 oz)   SpO2 98%   BMI 24.08 kg/m²     Physical Exam  Vitals and nursing note reviewed.   Constitutional:       General: She is not in acute distress.     Appearance: Normal appearance.   HENT:      Head: Normocephalic and atraumatic.      Right Ear: Tympanic membrane and external ear normal.      Left Ear: There is impacted cerumen.      Nose: Nose normal.      Mouth/Throat:      Mouth: Mucous membranes are moist.   Eyes:      Extraocular Movements: Extraocular movements intact.      Conjunctiva/sclera: Conjunctivae normal.      Pupils: Pupils are equal, round, and reactive to light.   Cardiovascular:      Rate and Rhythm: Normal rate and regular rhythm.      Pulses: Normal pulses.      Heart sounds: Normal heart sounds. No murmur heard.  Pulmonary:      Effort: Pulmonary effort is normal.      Breath sounds: Normal breath sounds. No wheezing, rhonchi or rales.   Abdominal:      " General: Bowel sounds are normal.      Palpations: Abdomen is soft.      Tenderness: There is no abdominal tenderness. There is no guarding.   Musculoskeletal:         General: Normal range of motion.      Cervical back: Normal range of motion.      Right lower leg: No edema.      Left lower leg: No edema.   Lymphadenopathy:      Cervical: No cervical adenopathy.   Skin:     General: Skin is warm.      Capillary Refill: Capillary refill takes less than 2 seconds.   Neurological:      General: No focal deficit present.      Mental Status: She is alert and oriented to person, place, and time.   Psychiatric:         Mood and Affect: Mood normal.         Behavior: Behavior normal.             Ear cerumen removal    Date/Time: 7/30/2024 8:00 AM    Performed by: Jayant Yoon DO  Authorized by: Jayant Yoon DO  Universal Protocol:  Consent: Verbal consent obtained.  Consent given by: patient  Patient identity confirmed: verbally with patient    Patient location:  Clinic  Procedure details:     Location:  L ear    Procedure type: irrigation with instrumentation      Instrumentation: curette      Approach:  External  Post-procedure details:     Complication:  None    Hearing quality:  Improved    Patient tolerance of procedure:  Tolerated well, no immediate complications

## 2024-07-30 NOTE — PATIENT INSTRUCTIONS
Medicare Preventive Visit Patient Instructions  Thank you for completing your Welcome to Medicare Visit or Medicare Annual Wellness Visit today. Your next wellness visit will be due in one year (7/31/2025).  The screening/preventive services that you may require over the next 5-10 years are detailed below. Some tests may not apply to you based off risk factors and/or age. Screening tests ordered at today's visit but not completed yet may show as past due. Also, please note that scanned in results may not display below.  Preventive Screenings:  Service Recommendations Previous Testing/Comments   Colorectal Cancer Screening  * Colonoscopy    * Fecal Occult Blood Test (FOBT)/Fecal Immunochemical Test (FIT)  * Fecal DNA/Cologuard Test  * Flexible Sigmoidoscopy Age: 45-75 years old   Colonoscopy: every 10 years (may be performed more frequently if at higher risk)  OR  FOBT/FIT: every 1 year  OR  Cologuard: every 3 years  OR  Sigmoidoscopy: every 5 years  Screening may be recommended earlier than age 45 if at higher risk for colorectal cancer. Also, an individualized decision between you and your healthcare provider will decide whether screening between the ages of 76-85 would be appropriate. Colonoscopy: 07/29/2021  FOBT/FIT: Not on file  Cologuard: Not on file  Sigmoidoscopy: Not on file    Screening Current     Breast Cancer Screening Age: 40+ years old  Frequency: every 1-2 years  Not required if history of left and right mastectomy Mammogram: 04/19/2024    Screening Current   Cervical Cancer Screening Between the ages of 21-29, pap smear recommended once every 3 years.   Between the ages of 30-65, can perform pap smear with HPV co-testing every 5 years.   Recommendations may differ for women with a history of total hysterectomy, cervical cancer, or abnormal pap smears in past. Pap Smear: 05/26/2020    History Cervical Cancer   Hepatitis C Screening Once for adults born between 1945 and 1965  More frequently in  patients at high risk for Hepatitis C Hep C Antibody: 12/07/2012    Screening Current   Diabetes Screening 1-2 times per year if you're at risk for diabetes or have pre-diabetes Fasting glucose: 90 mg/dL (7/20/2024)  A1C: 5.9 % (7/20/2024)  Screening Current   Cholesterol Screening Once every 5 years if you don't have a lipid disorder. May order more often based on risk factors. Lipid panel: 07/20/2024    Screening Not Indicated  History Lipid Disorder     Other Preventive Screenings Covered by Medicare:  Abdominal Aortic Aneurysm (AAA) Screening: covered once if your at risk. You're considered to be at risk if you have a family history of AAA.  Lung Cancer Screening: covers low dose CT scan once per year if you meet all of the following conditions: (1) Age 55-77; (2) No signs or symptoms of lung cancer; (3) Current smoker or have quit smoking within the last 15 years; (4) You have a tobacco smoking history of at least 20 pack years (packs per day multiplied by number of years you smoked); (5) You get a written order from a healthcare provider.  Glaucoma Screening: covered annually if you're considered high risk: (1) You have diabetes OR (2) Family history of glaucoma OR (3)  aged 50 and older OR (4)  American aged 65 and older  Osteoporosis Screening: covered every 2 years if you meet one of the following conditions: (1) You're estrogen deficient and at risk for osteoporosis based off medical history and other findings; (2) Have a vertebral abnormality; (3) On glucocorticoid therapy for more than 3 months; (4) Have primary hyperparathyroidism; (5) On osteoporosis medications and need to assess response to drug therapy.   Last bone density test (DXA Scan): 06/03/2022.  HIV Screening: covered annually if you're between the age of 15-65. Also covered annually if you are younger than 15 and older than 65 with risk factors for HIV infection. For pregnant patients, it is covered up to 3 times per  pregnancy.    Immunizations:  Immunization Recommendations   Influenza Vaccine Annual influenza vaccination during flu season is recommended for all persons aged >= 6 months who do not have contraindications   Pneumococcal Vaccine   * Pneumococcal conjugate vaccine = PCV13 (Prevnar 13), PCV15 (Vaxneuvance), PCV20 (Prevnar 20)  * Pneumococcal polysaccharide vaccine = PPSV23 (Pneumovax) Adults 19-65 yo with certain risk factors or if 65+ yo  If never received any pneumonia vaccine: recommend Prevnar 20 (PCV20)  Give PCV20 if previously received 1 dose of PCV13 or PPSV23   Hepatitis B Vaccine 3 dose series if at intermediate or high risk (ex: diabetes, end stage renal disease, liver disease)   Respiratory syncytial virus (RSV) Vaccine - COVERED BY MEDICARE PART D  * RSVPreF3 (Arexvy) CDC recommends that adults 60 years of age and older may receive a single dose of RSV vaccine using shared clinical decision-making (SCDM)   Tetanus (Td) Vaccine - COST NOT COVERED BY MEDICARE PART B Following completion of primary series, a booster dose should be given every 10 years to maintain immunity against tetanus. Td may also be given as tetanus wound prophylaxis.   Tdap Vaccine - COST NOT COVERED BY MEDICARE PART B Recommended at least once for all adults. For pregnant patients, recommended with each pregnancy.   Shingles Vaccine (Shingrix) - COST NOT COVERED BY MEDICARE PART B  2 shot series recommended in those 19 years and older who have or will have weakened immune systems or those 50 years and older     Health Maintenance Due:      Topic Date Due   • Breast Cancer Screening: Mammogram  04/19/2025   • Colorectal Cancer Screening  07/29/2031   • Hepatitis C Screening  Completed     Immunizations Due:      Topic Date Due   • Influenza Vaccine (1) 09/01/2024     Advance Directives   What are advance directives?  Advance directives are legal documents that state your wishes and plans for medical care. These plans are made ahead of  time in case you lose your ability to make decisions for yourself. Advance directives can apply to any medical decision, such as the treatments you want, and if you want to donate organs.   What are the types of advance directives?  There are many types of advance directives, and each state has rules about how to use them. You may choose a combination of any of the following:  Living will:  This is a written record of the treatment you want. You can also choose which treatments you do not want, which to limit, and which to stop at a certain time. This includes surgery, medicine, IV fluid, and tube feedings.   Durable power of  for healthcare (DPAHC):  This is a written record that states who you want to make healthcare choices for you when you are unable to make them for yourself. This person, called a proxy, is usually a family member or a friend. You may choose more than 1 proxy.  Do not resuscitate (DNR) order:  A DNR order is used in case your heart stops beating or you stop breathing. It is a request not to have certain forms of treatment, such as CPR. A DNR order may be included in other types of advance directives.  Medical directive:  This covers the care that you want if you are in a coma, near death, or unable to make decisions for yourself. You can list the treatments you want for each condition. Treatment may include pain medicine, surgery, blood transfusions, dialysis, IV or tube feedings, and a ventilator (breathing machine).  Values history:  This document has questions about your views, beliefs, and how you feel and think about life. This information can help others choose the care that you would choose.  Why are advance directives important?  An advance directive helps you control your care. Although spoken wishes may be used, it is better to have your wishes written down. Spoken wishes can be misunderstood, or not followed. Treatments may be given even if you do not want them. An advance  directive may make it easier for your family to make difficult choices about your care.       © Copyright [employer] 2018 Information is for End User's use only and may not be sold, redistributed or otherwise used for commercial purposes. All illustrations and images included in CareNotes® are the copyrighted property of [employer]., Inc[employer] or [employer]

## 2024-07-30 NOTE — ASSESSMENT & PLAN NOTE
- Diminished hearing on the left.  Has had significant cerumen impaction previously  -Impaction removal per procedure note.  -Otherwise well with no complications.  Hearing improved.

## 2024-08-29 PROBLEM — H61.22 IMPACTED CERUMEN OF LEFT EAR: Status: RESOLVED | Noted: 2024-07-30 | Resolved: 2024-08-29

## 2025-01-19 DIAGNOSIS — K21.9 GASTROESOPHAGEAL REFLUX DISEASE, UNSPECIFIED WHETHER ESOPHAGITIS PRESENT: ICD-10-CM

## 2025-01-19 DIAGNOSIS — E78.2 MIXED HYPERLIPIDEMIA: ICD-10-CM

## 2025-01-20 RX ORDER — ATORVASTATIN CALCIUM 10 MG/1
10 TABLET, FILM COATED ORAL DAILY
Qty: 100 TABLET | Refills: 1 | Status: SHIPPED | OUTPATIENT
Start: 2025-01-20

## 2025-01-20 RX ORDER — FAMOTIDINE 20 MG/1
20 TABLET, FILM COATED ORAL 2 TIMES DAILY
Qty: 200 TABLET | Refills: 1 | Status: SHIPPED | OUTPATIENT
Start: 2025-01-20

## 2025-01-21 ENCOUNTER — TELEPHONE (OUTPATIENT)
Dept: FAMILY MEDICINE CLINIC | Facility: CLINIC | Age: 75
End: 2025-01-21

## 2025-01-24 ENCOUNTER — RA CDI HCC (OUTPATIENT)
Dept: OTHER | Facility: HOSPITAL | Age: 75
End: 2025-01-24

## 2025-01-27 ENCOUNTER — APPOINTMENT (OUTPATIENT)
Dept: LAB | Facility: CLINIC | Age: 75
End: 2025-01-27
Payer: COMMERCIAL

## 2025-01-27 DIAGNOSIS — E03.8 SUBCLINICAL HYPOTHYROIDISM: ICD-10-CM

## 2025-01-27 DIAGNOSIS — R73.01 IMPAIRED FASTING GLUCOSE: ICD-10-CM

## 2025-01-27 LAB
ALBUMIN SERPL BCG-MCNC: 4.1 G/DL (ref 3.5–5)
ALP SERPL-CCNC: 71 U/L (ref 34–104)
ALT SERPL W P-5'-P-CCNC: 12 U/L (ref 7–52)
ANION GAP SERPL CALCULATED.3IONS-SCNC: 5 MMOL/L (ref 4–13)
AST SERPL W P-5'-P-CCNC: 15 U/L (ref 13–39)
BILIRUB SERPL-MCNC: 0.81 MG/DL (ref 0.2–1)
BUN SERPL-MCNC: 17 MG/DL (ref 5–25)
CALCIUM SERPL-MCNC: 9.7 MG/DL (ref 8.4–10.2)
CHLORIDE SERPL-SCNC: 105 MMOL/L (ref 96–108)
CO2 SERPL-SCNC: 30 MMOL/L (ref 21–32)
CREAT SERPL-MCNC: 0.8 MG/DL (ref 0.6–1.3)
EST. AVERAGE GLUCOSE BLD GHB EST-MCNC: 120 MG/DL
GFR SERPL CREATININE-BSD FRML MDRD: 71 ML/MIN/1.73SQ M
GLUCOSE P FAST SERPL-MCNC: 93 MG/DL (ref 65–99)
HBA1C MFR BLD: 5.8 %
POTASSIUM SERPL-SCNC: 4.3 MMOL/L (ref 3.5–5.3)
PROT SERPL-MCNC: 7 G/DL (ref 6.4–8.4)
SODIUM SERPL-SCNC: 140 MMOL/L (ref 135–147)
T4 FREE SERPL-MCNC: 0.6 NG/DL (ref 0.61–1.12)
TSH SERPL DL<=0.05 MIU/L-ACNC: 4.78 UIU/ML (ref 0.45–4.5)

## 2025-01-27 PROCEDURE — 83036 HEMOGLOBIN GLYCOSYLATED A1C: CPT

## 2025-01-27 PROCEDURE — 84443 ASSAY THYROID STIM HORMONE: CPT

## 2025-01-27 PROCEDURE — 84439 ASSAY OF FREE THYROXINE: CPT

## 2025-01-27 PROCEDURE — 80053 COMPREHEN METABOLIC PANEL: CPT

## 2025-01-27 PROCEDURE — 36415 COLL VENOUS BLD VENIPUNCTURE: CPT

## 2025-01-30 ENCOUNTER — OFFICE VISIT (OUTPATIENT)
Dept: FAMILY MEDICINE CLINIC | Facility: CLINIC | Age: 75
End: 2025-01-30
Payer: COMMERCIAL

## 2025-01-30 VITALS
DIASTOLIC BLOOD PRESSURE: 70 MMHG | RESPIRATION RATE: 16 BRPM | TEMPERATURE: 97 F | SYSTOLIC BLOOD PRESSURE: 110 MMHG | HEIGHT: 66 IN | OXYGEN SATURATION: 96 % | HEART RATE: 73 BPM | WEIGHT: 151.6 LBS | BODY MASS INDEX: 24.36 KG/M2

## 2025-01-30 DIAGNOSIS — E06.3 HYPOTHYROIDISM DUE TO HASHIMOTO THYROIDITIS: ICD-10-CM

## 2025-01-30 DIAGNOSIS — K21.9 GASTROESOPHAGEAL REFLUX DISEASE, UNSPECIFIED WHETHER ESOPHAGITIS PRESENT: Primary | ICD-10-CM

## 2025-01-30 DIAGNOSIS — Z23 ENCOUNTER FOR IMMUNIZATION: ICD-10-CM

## 2025-01-30 DIAGNOSIS — K57.90 DIVERTICULOSIS: ICD-10-CM

## 2025-01-30 DIAGNOSIS — M81.0 AGE-RELATED OSTEOPOROSIS WITHOUT CURRENT PATHOLOGICAL FRACTURE: ICD-10-CM

## 2025-01-30 PROCEDURE — 90677 PCV20 VACCINE IM: CPT

## 2025-01-30 PROCEDURE — G0008 ADMIN INFLUENZA VIRUS VAC: HCPCS

## 2025-01-30 PROCEDURE — 99214 OFFICE O/P EST MOD 30 MIN: CPT | Performed by: FAMILY MEDICINE

## 2025-01-30 PROCEDURE — 90662 IIV NO PRSV INCREASED AG IM: CPT

## 2025-01-30 PROCEDURE — G0009 ADMIN PNEUMOCOCCAL VACCINE: HCPCS

## 2025-01-30 PROCEDURE — G2211 COMPLEX E/M VISIT ADD ON: HCPCS | Performed by: FAMILY MEDICINE

## 2025-01-30 RX ORDER — LEVOTHYROXINE SODIUM 25 UG/1
25 TABLET ORAL
Qty: 100 TABLET | Refills: 3 | Status: SHIPPED | OUTPATIENT
Start: 2025-01-30

## 2025-01-30 RX ORDER — VITAMIN B COMPLEX
1 CAPSULE ORAL DAILY
COMMUNITY

## 2025-01-30 NOTE — ASSESSMENT & PLAN NOTE
Stable.  Continue Pepcid twice daily.  Continue working on antireflux measures.  -Follow-up in 6 months or sooner.

## 2025-01-30 NOTE — PROGRESS NOTES
Name: Aimee Fernández      : 1950      MRN: [id number]  Encounter Provider: Jayant Yoon DO  Encounter Date: 2025   Encounter department: [facility]    Assessment & Plan  Gastroesophageal reflux disease, unspecified whether esophagitis present  Stable.  Continue Pepcid twice daily.  Continue working on antireflux measures.  -Follow-up in 6 months or sooner.       Diverticulosis  History of.  No recent flares.  Last colonoscopy  with recall in 10 years.       Hypothyroidism due to Hashimoto thyroiditis  -Discussed most recent blood work showing low T4 and hypothyroid at this time.  Discussed the health risks of continued low thyroid.  -Start levothyroxine 25 mcg every morning.  -Repeat blood work in 6 weeks.  Orders:    levothyroxine 25 mcg tablet; Take 1 tablet (25 mcg total) by mouth daily in the early morning    TSH, 3rd generation with Free T4 reflex; Future    Encounter for immunization    Orders:    Pneumococcal Conjugate Vaccine 20-valent (Pcv20)    influenza vaccine, high-dose, PF 0.5 mL (Fluzone High Dose)    Age-related osteoporosis without current pathological fracture  -Has been working on resistance training.  Supplementing with calcium and vitamin D.  -Will update DEXA scan.  Discussed the risks of continued low bone density or bone mass with osteoporosis and the benefits of medical treatments.  -Will follow-up with DEXA results.              History of Present Illness     Aimee is a 74-year-old female presents today for follow-up.  She notes overall she is doing well.  She is taking all her medications as prescribed.  She notes she is supplementing with calcium and vitamin D for her osteoporosis.  We continue to discussed the risk of osteoporosis.  We will update DEXA scan to evaluate current bone strength.  She notes her reflux symptoms are well-controlled.  She continues on Pepcid hide is watching her triggers.  She is getting good sleep.  She is working on  adequate exercise.  No other concerns today.      Review of Systems   Constitutional:  Negative for chills and fever.   HENT:  Negative for ear pain and sore throat.    Eyes:  Negative for pain and visual disturbance.   Respiratory:  Negative for cough and shortness of breath.    Cardiovascular:  Negative for chest pain and palpitations.   Gastrointestinal:  Negative for abdominal pain and vomiting.   Genitourinary:  Negative for dysuria and hematuria.   Musculoskeletal:  Negative for arthralgias and back pain.   Skin:  Negative for color change and rash.   Neurological:  Negative for seizures and syncope.   Psychiatric/Behavioral:  Negative for confusion and sleep disturbance. The patient is not nervous/anxious.    All other systems reviewed and are negative.    Past Medical History:   Diagnosis Date    Cervical cancer (HCC)     last assessed 09Apr2014    Diverticulosis     Ear problems     HL (hearing loss)     Hyperlipemia     Tinnitus      Past Surgical History:   Procedure Laterality Date    BREAST BIOPSY Left     COLON SURGERY      Bowel perforation    COLONOSCOPY      (Fiberoptic)    EXPLORATORY LAPAROTOMY W/ BOWEL RESECTION N/A 01/21/2021    Procedure: LAPAROTOMY EXPLORATORY W/ REPAIR OF UMBLICAL HERNIA;  Surgeon: Fernando Luevano MD;  Location: [facility];  Service: General    HERNIA REPAIR      HYSTERECTOMY      age 29    NM LAPAROSCOPY SURG RPR INITIAL INGUINAL HERNIA Right 01/06/2021    Procedure: ROBOTIC INGUINAL HERNIA REPAIR;  Surgeon: Arin Blake MD;  Location: [facility];  Service: General     Family History   Problem Relation Age of Onset    Breast cancer Mother     Breast cancer Daughter     Ovarian cancer Maternal Grandmother     Breast cancer Paternal Aunt     Breast cancer Cousin     Breast cancer Family     Thyroid disease Family      Social History     Tobacco Use    Smoking status: Never    Smokeless tobacco: Never   Vaping Use    Vaping status: Never Used   Substance and Sexual Activity     "Alcohol use: Not Currently     Comment: holidays - wine    Drug use: No    Sexual activity: Yes     Partners: Male     Current Outpatient Medications on File Prior to Visit   Medication Sig    atorvastatin (LIPITOR) 10 mg tablet TAKE 1 TABLET BY MOUTH EVERY DAY    b complex vitamins capsule Take 1 capsule by mouth daily    Calcium Carb-Cholecalciferol 1000-800 MG-UNIT TABS Take by mouth    famotidine (PEPCID) 20 mg tablet TAKE 1 TABLET BY MOUTH TWICE A DAY    Probiotic Product (PROBIOTIC-10) CAPS Take by mouth     Allergies   Allergen Reactions    Codeine Hives     Immunization History   Administered Date(s) Administered    COVID-19 MODERNA VACC 0.25 ML IM BOOSTER 11/01/2021    COVID-19 MODERNA VACC 0.5 ML IM 02/19/2021, 03/19/2021, 05/23/2022    COVID-19 Moderna Vac BIVALENT 12 Yr+ IM 0.5 ML 12/22/2022    COVID-19 Moderna mRNA Vaccine 12 Yr+ 50 mcg/0.5 mL (Spikevax) 10/27/2023    INFLUENZA 10/27/2023    Influenza Quadrivalent, 6-35 Months IM 11/11/2015    Influenza Split High Dose Preservative Free IM 11/23/2016, 11/27/2017, 01/30/2025    Influenza, high dose seasonal 0.7 mL 10/08/2019, 10/20/2020, 11/01/2021, 12/19/2022    Influenza, seasonal, injectable 11/21/2012    Pneumococcal Conjugate 13-Valent 11/11/2015    Pneumococcal Conjugate Vaccine 20-valent (Pcv20), Polysace 01/30/2025    Pneumococcal Polysaccharide PPV23 04/11/2014, 11/11/2015     Objective   /70 (BP Location: Left arm, Patient Position: Sitting, Cuff Size: Standard)   Pulse 73   Temp (!) 97 °F (36.1 °C) (Tympanic)   Resp 16   Ht 5' 6.22\" (1.682 m)   Wt 68.8 kg (151 lb 9.6 oz)   SpO2 96%   BMI 24.31 kg/m²     Physical Exam  Vitals and nursing note reviewed.   Constitutional:       General: She is not in acute distress.     Appearance: Normal appearance.   HENT:      Head: Normocephalic and atraumatic.      Right Ear: Tympanic membrane and external ear normal.      Left Ear: Tympanic membrane and external ear normal.      Nose: Nose " normal.      Mouth/Throat:      Mouth: Mucous membranes are moist.   Eyes:      Extraocular Movements: Extraocular movements intact.      Conjunctiva/sclera: Conjunctivae normal.      Pupils: Pupils are equal, round, and reactive to light.   Cardiovascular:      Rate and Rhythm: Normal rate and regular rhythm.      Pulses: Normal pulses.      Heart sounds: Normal heart sounds. No murmur heard.  Pulmonary:      Effort: Pulmonary effort is normal.      Breath sounds: Normal breath sounds. No wheezing, rhonchi or rales.   Abdominal:      General: Bowel sounds are normal.      Palpations: Abdomen is soft.      Tenderness: There is no abdominal tenderness. There is no guarding.   Musculoskeletal:         General: Normal range of motion.      Cervical back: Normal range of motion.      Right lower leg: No edema.      Left lower leg: No edema.   Lymphadenopathy:      Cervical: No cervical adenopathy.   Skin:     General: Skin is warm.      Capillary Refill: Capillary refill takes less than 2 seconds.   Neurological:      General: No focal deficit present.      Mental Status: She is alert and oriented to person, place, and time.   Psychiatric:         Mood and Affect: Mood normal.         Behavior: Behavior normal.

## 2025-02-11 PROBLEM — E06.3 HYPOTHYROIDISM DUE TO HASHIMOTO THYROIDITIS: Status: ACTIVE | Noted: 2025-02-11

## 2025-02-11 NOTE — ASSESSMENT & PLAN NOTE
-Has been working on resistance training.  Supplementing with calcium and vitamin D.  -Will update DEXA scan.  Discussed the risks of continued low bone density or bone mass with osteoporosis and the benefits of medical treatments.  -Will follow-up with DEXA results.

## 2025-02-11 NOTE — ASSESSMENT & PLAN NOTE
-Discussed most recent blood work showing low T4 and hypothyroid at this time.  Discussed the health risks of continued low thyroid.  -Start levothyroxine 25 mcg every morning.  -Repeat blood work in 6 weeks.  Orders:    levothyroxine 25 mcg tablet; Take 1 tablet (25 mcg total) by mouth daily in the early morning    TSH, 3rd generation with Free T4 reflex; Future

## 2025-05-05 ENCOUNTER — HOSPITAL ENCOUNTER (OUTPATIENT)
Dept: RADIOLOGY | Age: 75
Discharge: HOME | End: 2025-05-05
Payer: COMMERCIAL

## 2025-05-05 VITALS — WEIGHT: 151 LBS | HEIGHT: 65 IN | BODY MASS INDEX: 25.16 KG/M2

## 2025-05-05 DIAGNOSIS — M81.0 AGE-RELATED OSTEOPOROSIS WITHOUT CURRENT PATHOLOGICAL FRACTURE: ICD-10-CM

## 2025-05-05 PROCEDURE — 77080 DXA BONE DENSITY AXIAL: CPT

## 2025-07-16 ENCOUNTER — HOSPITAL ENCOUNTER (OUTPATIENT)
Dept: RADIOLOGY | Age: 75
Discharge: HOME | End: 2025-07-16
Attending: FAMILY MEDICINE
Payer: COMMERCIAL

## 2025-07-16 DIAGNOSIS — Z12.31 SCREENING MAMMOGRAM, ENCOUNTER FOR: ICD-10-CM

## 2025-07-16 PROCEDURE — 77067 SCR MAMMO BI INCL CAD: CPT

## 2025-07-16 PROCEDURE — 77063 BREAST TOMOSYNTHESIS BI: CPT

## 2025-08-07 ENCOUNTER — APPOINTMENT (OUTPATIENT)
Dept: LAB | Facility: CLINIC | Age: 75
End: 2025-08-07
Payer: COMMERCIAL

## 2025-08-11 ENCOUNTER — OFFICE VISIT (OUTPATIENT)
Dept: FAMILY MEDICINE CLINIC | Facility: CLINIC | Age: 75
End: 2025-08-11
Payer: COMMERCIAL

## (undated) DEVICE — VIOLET BRAIDED (POLYGLACTIN 910), SYNTHETIC ABSORBABLE SUTURE: Brand: COATED VICRYL

## (undated) DEVICE — SUT PDS II 1 CT-1 27 IN Z347H

## (undated) DEVICE — NEEDLE HYPO 22G X 1-1/2 IN

## (undated) DEVICE — INTENDED FOR TISSUE SEPARATION, AND OTHER PROCEDURES THAT REQUIRE A SHARP SURGICAL BLADE TO PUNCTURE OR CUT.: Brand: BARD-PARKER SAFETY BLADES SIZE 15, STERILE

## (undated) DEVICE — PENCIL ELECTROSURG E-Z CLEAN -0035H

## (undated) DEVICE — JP PERF DRN SIL FLT 10MM FULL: Brand: CARDINAL HEALTH

## (undated) DEVICE — CHLORAPREP HI-LITE 26ML ORANGE

## (undated) DEVICE — 2963 MEDIPORE SOFT CLOTH TAPE 3 IN X 10 YD 12 RLS/CS: Brand: 3M™ MEDIPORE™

## (undated) DEVICE — MEDI-VAC YANK SUCT HNDL W/TPRD BULBOUS TIP: Brand: CARDINAL HEALTH

## (undated) DEVICE — SCD SEQUENTIAL COMPRESSION COMFORT SLEEVE MEDIUM KNEE LENGTH: Brand: KENDALL SCD

## (undated) DEVICE — ELECTRO LUBE IS A SINGLE PATIENT USE DEVICE THAT IS INTENDED TO BE USED ON ELECTROSURGICAL ELECTRODES TO REDUCE STICKING.: Brand: KEY SURGICAL ELECTRO LUBE

## (undated) DEVICE — SUT PDS II 3-0 SH 27 IN Z316H

## (undated) DEVICE — TOWEL SURG XR DETECT GREEN STRL RFD

## (undated) DEVICE — 3M™ STERI-STRIP™ REINFORCED ADHESIVE SKIN CLOSURES, R1547, 1/2 IN X 4 IN (12 MM X 100 MM), 6 STRIPS/ENVELOPE: Brand: 3M™ STERI-STRIP™

## (undated) DEVICE — LIGHT HANDLE COVER SLEEVE DISP BLUE STELLAR

## (undated) DEVICE — ALLENTOWN LAP CHOLE APP PACK: Brand: CARDINAL HEALTH

## (undated) DEVICE — DRAPE EQUIPMENT RF WAND

## (undated) DEVICE — GLOVE INDICATOR PI UNDERGLOVE SZ 6.5 BLUE

## (undated) DEVICE — 3M™ STERI-STRIP™ REINFORCED ADHESIVE SKIN CLOSURES, R1546, 1/4 IN X 4 IN (6 MM X 100 MM), 10 STRIPS/ENVELOPE: Brand: 3M™ STERI-STRIP™

## (undated) DEVICE — PLUMEPEN PRO 10FT

## (undated) DEVICE — POOLE SUCTION HANDLE: Brand: CARDINAL HEALTH

## (undated) DEVICE — GLOVE SRG BIOGEL 6.5

## (undated) DEVICE — BLADELESS OBTURATOR: Brand: WECK VISTA

## (undated) DEVICE — TUBING SUCTION 5MM X 12 FT

## (undated) DEVICE — ASTOUND STANDARD SURGICAL GOWN, XL: Brand: CONVERTORS

## (undated) DEVICE — GLOVE INDICATOR PI UNDERGLOVE SZ 7.5 BLUE

## (undated) DEVICE — SUT VICRYL 0 REEL 54 IN J287G

## (undated) DEVICE — PMI DISPOSABLE PUNCTURE CLOSURE DEVICE / SUTURE GRASPER: Brand: PMI

## (undated) DEVICE — ENSEAL 20 CM SHAFT, LARGE JAW: Brand: ENSEAL X1

## (undated) DEVICE — INTENDED FOR TISSUE SEPARATION, AND OTHER PROCEDURES THAT REQUIRE A SHARP SURGICAL BLADE TO PUNCTURE OR CUT.: Brand: BARD-PARKER SAFETY BLADES SIZE 10, STERILE

## (undated) DEVICE — 3M™ STERI-STRIP™ COMPOUND BENZOIN TINCTURE 40 BAGS/CARTON 4 CARTONS/CASE C1544: Brand: 3M™ STERI-STRIP™

## (undated) DEVICE — JACKSON-PRATT 100CC BULB RESERVOIR: Brand: CARDINAL HEALTH

## (undated) DEVICE — TISSUE RETRIEVAL SYSTEM: Brand: INZII RETRIEVAL SYSTEM

## (undated) DEVICE — GLOVE SRG BIOGEL ECLIPSE 7

## (undated) DEVICE — [HIGH FLOW INSUFFLATOR,  DO NOT USE IF PACKAGE IS DAMAGED,  KEEP DRY,  KEEP AWAY FROM SUNLIGHT,  PROTECT FROM HEAT AND RADIOACTIVE SOURCES.]: Brand: PNEUMOSURE

## (undated) DEVICE — COLUMN DRAPE

## (undated) DEVICE — SUT VLOC 90 3-0 V-20 9IN VLOCM0644

## (undated) DEVICE — SUT PDS II 1 CTX 36 IN Z371T

## (undated) DEVICE — PROXIMATE RELOADABLE LINEAR CUTTER WITH SAFETY LOCK-OUT, 75MM: Brand: PROXIMATE

## (undated) DEVICE — CANNULA SEAL

## (undated) DEVICE — GLOVE INDICATOR PI UNDERGLOVE SZ 7 BLUE

## (undated) DEVICE — MEGASUTURECUT ND: Brand: ENDOWRIST;DAVINCI SI

## (undated) DEVICE — GAUZE SPONGES,16 PLY: Brand: CURITY

## (undated) DEVICE — SUT VICRYL 0 CT-1 CR/8 27 IN JJ41G

## (undated) DEVICE — MONOPOLAR CURVED SCISSORS: Brand: ENDOWRIST;DAVINCI SI

## (undated) DEVICE — PROGRASP FORCEPS: Brand: ENDOWRIST;DAVINCI SI

## (undated) DEVICE — GLOVE SRG BIOGEL 7

## (undated) DEVICE — SUT MONOCRYL 4-0 PS-2 27 IN Y426H

## (undated) DEVICE — TRAY FOLEY 16FR URIMETER SURESTEP

## (undated) DEVICE — DRAPE SHEET X-LG

## (undated) DEVICE — VIAL DECANTER

## (undated) DEVICE — ARM DRAPE

## (undated) DEVICE — LIGACLIP MCA MULTIPLE CLIP APPLIERS, 20 MEDIUM CLIPS: Brand: LIGACLIP

## (undated) DEVICE — PROXIMATE SKIN STAPLERS (35 WIDE) CONTAINS 35 STAINLESS STEEL STAPLES (FIXED HEAD): Brand: PROXIMATE

## (undated) DEVICE — TELFA NON-ADHERENT ABSORBENT DRESSING: Brand: TELFA

## (undated) DEVICE — BETHLEHEM MAJOR GENERAL PACK: Brand: CARDINAL HEALTH

## (undated) DEVICE — TIP COVER ACCESSORY

## (undated) DEVICE — ABDOMINAL PAD: Brand: DERMACEA